# Patient Record
Sex: FEMALE | Race: BLACK OR AFRICAN AMERICAN | NOT HISPANIC OR LATINO | Employment: OTHER | URBAN - METROPOLITAN AREA
[De-identification: names, ages, dates, MRNs, and addresses within clinical notes are randomized per-mention and may not be internally consistent; named-entity substitution may affect disease eponyms.]

---

## 2023-04-26 ENCOUNTER — OFFICE VISIT (OUTPATIENT)
Dept: PRIMARY CARE | Facility: CLINIC | Age: 73
End: 2023-04-26
Payer: MEDICARE

## 2023-04-26 VITALS
SYSTOLIC BLOOD PRESSURE: 110 MMHG | DIASTOLIC BLOOD PRESSURE: 60 MMHG | WEIGHT: 185 LBS | BODY MASS INDEX: 28.98 KG/M2 | TEMPERATURE: 95.3 F

## 2023-04-26 DIAGNOSIS — E78.5 HYPERLIPIDEMIA, UNSPECIFIED HYPERLIPIDEMIA TYPE: ICD-10-CM

## 2023-04-26 DIAGNOSIS — E11.9 TYPE 2 DIABETES MELLITUS WITHOUT COMPLICATION, WITHOUT LONG-TERM CURRENT USE OF INSULIN (MULTI): ICD-10-CM

## 2023-04-26 DIAGNOSIS — Z00.00 HEALTHCARE MAINTENANCE: ICD-10-CM

## 2023-04-26 DIAGNOSIS — M51.36 LUMBAR DEGENERATIVE DISC DISEASE: Primary | ICD-10-CM

## 2023-04-26 DIAGNOSIS — R92.0 BREAST MICROCALCIFICATIONS: ICD-10-CM

## 2023-04-26 DIAGNOSIS — Z12.31 ENCOUNTER FOR SCREENING MAMMOGRAM FOR MALIGNANT NEOPLASM OF BREAST: ICD-10-CM

## 2023-04-26 PROBLEM — M79.672 BILATERAL LEG AND FOOT PAIN: Status: ACTIVE | Noted: 2023-04-26

## 2023-04-26 PROBLEM — B35.1 MYCOTIC TOENAILS: Status: ACTIVE | Noted: 2023-04-26

## 2023-04-26 PROBLEM — M79.671 BILATERAL LEG AND FOOT PAIN: Status: ACTIVE | Noted: 2023-04-26

## 2023-04-26 PROBLEM — I47.29 NON-SUSTAINED VENTRICULAR TACHYCARDIA (MULTI): Status: ACTIVE | Noted: 2023-04-26

## 2023-04-26 PROBLEM — M54.50 ACUTE LOW BACK PAIN: Status: ACTIVE | Noted: 2023-04-26

## 2023-04-26 PROBLEM — R92.8 ABNORMAL MAMMOGRAM: Status: ACTIVE | Noted: 2023-04-26

## 2023-04-26 PROBLEM — D64.9 ANEMIA: Status: ACTIVE | Noted: 2023-04-26

## 2023-04-26 PROBLEM — U09.9 POST COVID-19 CONDITION, UNSPECIFIED: Status: ACTIVE | Noted: 2023-04-26

## 2023-04-26 PROBLEM — I83.90 VARICOSE VEIN OF LEG: Status: ACTIVE | Noted: 2023-04-26

## 2023-04-26 PROBLEM — G89.29 CHRONIC PAIN OF BOTH KNEES: Status: ACTIVE | Noted: 2023-04-26

## 2023-04-26 PROBLEM — R79.89 ELEVATED D-DIMER: Status: ACTIVE | Noted: 2023-04-26

## 2023-04-26 PROBLEM — M25.552 HIP PAIN, ACUTE, LEFT: Status: ACTIVE | Noted: 2023-04-26

## 2023-04-26 PROBLEM — H40.003 GLAUCOMA SUSPECT OF BOTH EYES: Status: ACTIVE | Noted: 2023-04-26

## 2023-04-26 PROBLEM — M85.88 OSTEOPENIA OF LUMBAR SPINE: Status: ACTIVE | Noted: 2023-04-26

## 2023-04-26 PROBLEM — M25.519 PAIN, JOINT, SHOULDER: Status: ACTIVE | Noted: 2023-04-26

## 2023-04-26 PROBLEM — R21 SKIN RASH: Status: ACTIVE | Noted: 2023-04-26

## 2023-04-26 PROBLEM — M79.605 BILATERAL LEG AND FOOT PAIN: Status: ACTIVE | Noted: 2023-04-26

## 2023-04-26 PROBLEM — Z97.3 WEARS GLASSES: Status: ACTIVE | Noted: 2023-04-26

## 2023-04-26 PROBLEM — M25.562 CHRONIC PAIN OF BOTH KNEES: Status: ACTIVE | Noted: 2023-04-26

## 2023-04-26 PROBLEM — H40.009 GLAUCOMA SUSPECT: Status: ACTIVE | Noted: 2023-04-26

## 2023-04-26 PROBLEM — M54.12 CERVICAL RADICULOPATHY: Status: ACTIVE | Noted: 2023-04-26

## 2023-04-26 PROBLEM — H52.7 REFRACTIVE DISORDER: Status: ACTIVE | Noted: 2023-04-26

## 2023-04-26 PROBLEM — L65.9 HAIR LOSS: Status: ACTIVE | Noted: 2023-04-26

## 2023-04-26 PROBLEM — M19.90 OSTEOARTHROSIS: Status: ACTIVE | Noted: 2023-04-26

## 2023-04-26 PROBLEM — R26.89 STUMBLING GAIT: Status: ACTIVE | Noted: 2023-04-26

## 2023-04-26 PROBLEM — F32.1 DEPRESSION, MAJOR, SINGLE EPISODE, MODERATE (MULTI): Status: ACTIVE | Noted: 2023-04-26

## 2023-04-26 PROBLEM — E55.9 VITAMIN D DEFICIENCY: Status: ACTIVE | Noted: 2023-04-26

## 2023-04-26 PROBLEM — I10 BENIGN ESSENTIAL HYPERTENSION: Status: ACTIVE | Noted: 2023-04-26

## 2023-04-26 PROBLEM — M54.32 SCIATICA OF LEFT SIDE: Status: ACTIVE | Noted: 2023-04-26

## 2023-04-26 PROBLEM — E11.3293: Status: ACTIVE | Noted: 2023-04-26

## 2023-04-26 PROBLEM — M79.604 BILATERAL LEG AND FOOT PAIN: Status: ACTIVE | Noted: 2023-04-26

## 2023-04-26 PROBLEM — I51.7 LVH (LEFT VENTRICULAR HYPERTROPHY): Status: ACTIVE | Noted: 2023-04-26

## 2023-04-26 PROBLEM — H25.9 AGE-RELATED CATARACT: Status: ACTIVE | Noted: 2023-04-26

## 2023-04-26 PROBLEM — F32.9 REACTIVE DEPRESSION: Status: ACTIVE | Noted: 2023-04-26

## 2023-04-26 PROBLEM — D51.9 VITAMIN B12 DEFICIENCY ANEMIA: Status: ACTIVE | Noted: 2023-04-26

## 2023-04-26 PROBLEM — R20.2 PARESTHESIA OF RIGHT FOOT: Status: ACTIVE | Noted: 2023-04-26

## 2023-04-26 PROBLEM — H33.312 OPERCULATED RETINAL TEAR, LEFT EYE: Status: ACTIVE | Noted: 2023-04-26

## 2023-04-26 PROBLEM — H04.123 DRY EYES: Status: ACTIVE | Noted: 2023-04-26

## 2023-04-26 PROBLEM — H91.93 HEARING DIFFICULTY OF BOTH EARS: Status: ACTIVE | Noted: 2023-04-26

## 2023-04-26 PROBLEM — M25.561 CHRONIC PAIN OF BOTH KNEES: Status: ACTIVE | Noted: 2023-04-26

## 2023-04-26 PROBLEM — H25.13 AGE-RELATED NUCLEAR CATARACT, BILATERAL: Status: ACTIVE | Noted: 2023-04-26

## 2023-04-26 PROBLEM — I45.10 RIGHT BUNDLE BRANCH BLOCK (RBBB): Status: ACTIVE | Noted: 2023-04-26

## 2023-04-26 PROBLEM — R53.83 TIRED: Status: ACTIVE | Noted: 2023-04-26

## 2023-04-26 PROBLEM — R53.83 FATIGUE: Status: ACTIVE | Noted: 2023-04-26

## 2023-04-26 PROBLEM — R40.0 HAS DAYTIME DROWSINESS: Status: ACTIVE | Noted: 2023-04-26

## 2023-04-26 PROBLEM — M51.369 LUMBAR DEGENERATIVE DISC DISEASE: Status: ACTIVE | Noted: 2023-04-26

## 2023-04-26 PROBLEM — N18.30 CKD (CHRONIC KIDNEY DISEASE), STAGE III (MULTI): Status: ACTIVE | Noted: 2023-04-26

## 2023-04-26 PROBLEM — E78.00 LOW-DENSITY-LIPOID-TYPE (LDL) HYPERLIPOPROTEINEMIA: Status: ACTIVE | Noted: 2023-04-26

## 2023-04-26 PROBLEM — R94.31 ABNORMAL ELECTROCARDIOGRAPHY: Status: ACTIVE | Noted: 2023-04-26

## 2023-04-26 PROCEDURE — 4010F ACE/ARB THERAPY RXD/TAKEN: CPT | Performed by: INTERNAL MEDICINE

## 2023-04-26 PROCEDURE — 3078F DIAST BP <80 MM HG: CPT | Performed by: INTERNAL MEDICINE

## 2023-04-26 PROCEDURE — 3074F SYST BP LT 130 MM HG: CPT | Performed by: INTERNAL MEDICINE

## 2023-04-26 PROCEDURE — 99214 OFFICE O/P EST MOD 30 MIN: CPT | Performed by: INTERNAL MEDICINE

## 2023-04-26 RX ORDER — GLIMEPIRIDE 4 MG/1
2 TABLET ORAL DAILY
COMMUNITY
Start: 2012-11-19 | End: 2023-04-26 | Stop reason: SINTOL

## 2023-04-26 RX ORDER — AMITRIPTYLINE HYDROCHLORIDE 25 MG/1
1 TABLET, FILM COATED ORAL NIGHTLY
COMMUNITY
Start: 2012-04-06 | End: 2023-07-20 | Stop reason: SDUPTHER

## 2023-04-26 RX ORDER — LIDOCAINE 50 MG/G
1 PATCH TOPICAL DAILY
Qty: 30 PATCH | Refills: 1 | Status: SHIPPED | OUTPATIENT
Start: 2023-04-26 | End: 2024-05-02 | Stop reason: HOSPADM

## 2023-04-26 RX ORDER — METFORMIN HYDROCHLORIDE 500 MG/1
TABLET, EXTENDED RELEASE ORAL
COMMUNITY
Start: 2018-03-26 | End: 2023-07-20 | Stop reason: SDUPTHER

## 2023-04-26 RX ORDER — LISINOPRIL 10 MG/1
1 TABLET ORAL DAILY
COMMUNITY
Start: 2021-01-04 | End: 2023-07-20 | Stop reason: SDUPTHER

## 2023-04-26 RX ORDER — FERROUS SULFATE 325(65) MG
65 TABLET ORAL EVERY OTHER DAY
COMMUNITY
Start: 2022-02-04 | End: 2024-03-27 | Stop reason: ALTCHOICE

## 2023-04-26 RX ORDER — CHLORTHALIDONE 25 MG/1
1 TABLET ORAL DAILY
COMMUNITY
Start: 2012-04-06 | End: 2023-07-20 | Stop reason: SDUPTHER

## 2023-04-26 RX ORDER — DEXTROMETHORPHAN HYDROBROMIDE, GUAIFENESIN 5; 100 MG/5ML; MG/5ML
650 LIQUID ORAL EVERY 8 HOURS PRN
Qty: 30 TABLET | Refills: 0 | Status: SHIPPED | OUTPATIENT
Start: 2023-04-26 | End: 2023-05-04 | Stop reason: SDUPTHER

## 2023-04-26 RX ORDER — ROSUVASTATIN CALCIUM 40 MG/1
1 TABLET, COATED ORAL NIGHTLY
COMMUNITY
Start: 2021-02-17 | End: 2023-07-20 | Stop reason: SDUPTHER

## 2023-04-26 ASSESSMENT — PATIENT HEALTH QUESTIONNAIRE - PHQ9
2. FEELING DOWN, DEPRESSED OR HOPELESS: NOT AT ALL
1. LITTLE INTEREST OR PLEASURE IN DOING THINGS: NOT AT ALL
SUM OF ALL RESPONSES TO PHQ9 QUESTIONS 1 AND 2: 0

## 2023-04-26 NOTE — PROGRESS NOTES
Chief Complaint: Medicare Wellness Exam/Comprehensive Problem Focused Follow Up and Physical Exam    HPI:  This is a [age]-year-old -American female with past medical history positive for hyperlipidemia, hypertension, type II DM, DJD//arthritis.  She presented for checkup, Medicare wellness visit and reevaluation.  Does not smoke does not abuse EtOH.  Vitals all within normal limits.  She presented also complaining of hypoglycemic episodes with lowest level at 55.  She was recently started on Ozempic which she takes at half dose.  Had another diabetic medications include metformin, glimepiride, Januvia.  She is also complaining of lower back pain.  Pain is sharp and located at the mid lower back.  Pain is aggravated by sitting straight and relieved by laying forward.  She denies history of trauma.  Pain is moderately relieved with use of Tylenol.  She denies pain and swelling or any other joints groups.    Active Problem List  Hypertension, diabetes, hyperlipidemia, lower back pain    Comprehensive Medical/Surgical/Social/Family History  Past Medical History:   Diagnosis Date    Body mass index (BMI) 31.0-31.9, adult 12/07/2021    BMI 31.0-31.9,adult    Body mass index (BMI) 32.0-32.9, adult 07/12/2021    Body mass index (BMI) of 32.0 to 32.9 in adult    Body mass index (BMI)30.0-30.9, adult 05/03/2022    Body mass index (BMI) of 30.0 to 30.9 in adult    Body mass index (BMI)30.0-30.9, adult 09/10/2020    BMI 30.0-30.9,adult    Encounter for immunization 09/28/2018    Need for prophylactic vaccination and inoculation against influenza    Personal history of other drug therapy 03/11/2021    History of pneumococcal vaccination    Vitreous degeneration, unspecified eye     Vitreous degeneration     Past Surgical History:   Procedure Laterality Date    COLONOSCOPY  03/18/2013    Complete Colonoscopy     Social History     Social History Narrative    Not on file         Allergies and Medications  Sulfa (sulfonamide  "antibiotics)  Current Outpatient Medications on File Prior to Visit   Medication Sig Dispense Refill    amitriptyline (Elavil) 25 mg tablet Take 1 tablet (25 mg) by mouth once daily at bedtime.      chlorthalidone (Hygroton) 25 mg tablet Take 1 tablet (25 mg) by mouth once daily.      ferrous sulfate 325 (65 Fe) MG tablet Take 1 tablet (65 mg of iron) by mouth every other day.      flaxseed oil-omega 3,6,9 1,300 mg-845 mg -117 mg-117 mg capsule Take 1 capsule by mouth once daily.      lisinopril 10 mg tablet Take 1 tablet (10 mg) by mouth once daily.      metFORMIN XR (Glucophage-XR) 500 mg 24 hr tablet Take by mouth.      rosuvastatin (Crestor) 40 mg tablet Take 1 tablet (40 mg) by mouth once daily at bedtime.      semaglutide 0.25 mg or 0.5 mg (2 mg/3 mL) pen injector Inject under the skin.      [DISCONTINUED] glimepiride (Amaryl) 4 mg tablet Take 2 tablets (8 mg) by mouth once daily.       No current facility-administered medications on file prior to visit.       Medications and Supplements  prescribed by me and other practitioners or clinical pharmacist (such as prescriptions, OTC's, herbal therapies and supplements) were reviewed and documented in the medical record.    Tobacco/Alcohol/Opioid use, as well as Illicit Drug Use was screened for/reviewed and documented in Social History section and medication list as appropriate  Activities of Daily Living  In your present state of health, do you have any difficulty performing the following activities?:   Preparing food and eating?: Yes  Bathing yourself: Yes  Getting dressed: Yes  Using the toilet:Yes  Moving around from place to place: Yes  In the past year have you fallen or had a near fall?:No    Depression Screen  (Note: if answer to either of the following is \"Yes\", then a more complete depression screening is indicated)   Q1: Over the past two weeks, have you felt down, depressed or hopeless? No  Q2: Over the past two weeks, have you felt little interest or " pleasure in doing things? No    Current exercise habits: Gym/health club routine includes light weights.   Dietary issues discussed: Yes  Hearing difficulties: No  Safe in current home environment: yes  Visual Acuity assessed: no  Cognitive Impairment assessed: yes       Advance directives  Advanced Care Planning (including a Living Will, Healthcare POA, as well as specific end of life choices and/or directives), was discussed for approximately 16 minutes with the patient and/or surrogate, voluntarily, and documented in the medical record.     Cardiac Risk Assessment  Cardiovascular risk was discussed and, if needed, lifestyle modifications recommended, including nutritional choices, exercise, and elimination of habits contributing to risk. We agreed on a plan to reduce the current cardiovascular risk based on above discussion as needed.  Aspirin use/disuse was discussed after reviewing the updated guidelines below:    Consider low dose Aspirin ( mg) use if the benefit for cardiovascular disease prevention outweighs risk for bleeding complications.   In general, low dose ASA should be considered:  In patients WITHOUT prior MI/stroke/PAD (primary prevention):   a. Age <60: Use if 10-year cardiovascular disease risk >20%, with discussion of risks and benefits with patient  b. Age 60-<70: Use if 10-year cardiovascular disease risk >20% and low bleeding (e.g., gastrointenstinal) risk, with discussion of risks and benefits with patient  c. Age >=70: Do not use    In patients WITH prior MI/stroke/PAD (secondary prevention):   Generally use unless extremely high bleeding (e.g., gastrointenstinal) risk, with discussion of risks and benefits with patient    ROS otherwise negative aside from what was mentioned above in HPI.    Vitals  Vitals:    04/26/23 1432   BP: 110/60   Temp: 35.2 °C (95.3 °F)     Body mass index is 28.98 kg/m².        During the course of the visit the patient was educated and counseled about age  appropriate screening and preventive services. Completed preventive screenings were documented in the chart and orders were placed for outstanding screenings/procedures as documented in the Assessment and Plan.      Patient Instructions (the written plan) was given to the patient at check out.      Mayo Eagle MD

## 2023-04-26 NOTE — ASSESSMENT & PLAN NOTE
- Patient is compliant with the use of her diabetic medications.  - Most recent A1c was 7.1.  - Patient recently started on Ozempic with hypoglycemic episodes blood sugar of 55.  - We will discontinue glimepiride and continue Ozempic at stated dose.

## 2023-04-26 NOTE — ASSESSMENT & PLAN NOTE
- Patient presenting with worsening lower back pain.  - Pain consistent with suspicion for spinal stenosis.  - Review of prior x-ray lumbar spine showed DJD/OA of the LS-spine  - We will obtain CT LS spine.

## 2023-04-29 ENCOUNTER — LAB (OUTPATIENT)
Dept: LAB | Facility: LAB | Age: 73
End: 2023-04-29
Payer: MEDICARE

## 2023-04-29 DIAGNOSIS — M51.36 LUMBAR DEGENERATIVE DISC DISEASE: ICD-10-CM

## 2023-04-29 DIAGNOSIS — E78.5 HYPERLIPIDEMIA, UNSPECIFIED HYPERLIPIDEMIA TYPE: ICD-10-CM

## 2023-04-29 LAB
CHOLESTEROL (MG/DL) IN SER/PLAS: 114 MG/DL (ref 0–199)
CHOLESTEROL IN HDL (MG/DL) IN SER/PLAS: 53.6 MG/DL
CHOLESTEROL/HDL RATIO: 2.1
LDL: 38 MG/DL (ref 0–99)
TRIGLYCERIDE (MG/DL) IN SER/PLAS: 112 MG/DL (ref 0–149)
VLDL: 22 MG/DL (ref 0–40)

## 2023-04-29 PROCEDURE — 80061 LIPID PANEL: CPT

## 2023-04-29 PROCEDURE — 36415 COLL VENOUS BLD VENIPUNCTURE: CPT

## 2023-04-29 PROCEDURE — 82652 VIT D 1 25-DIHYDROXY: CPT

## 2023-05-01 DIAGNOSIS — E11.69 TYPE 2 DIABETES MELLITUS WITH OTHER SPECIFIED COMPLICATION, WITH LONG-TERM CURRENT USE OF INSULIN (MULTI): Primary | ICD-10-CM

## 2023-05-01 DIAGNOSIS — Z79.4 TYPE 2 DIABETES MELLITUS WITH OTHER SPECIFIED COMPLICATION, WITH LONG-TERM CURRENT USE OF INSULIN (MULTI): Primary | ICD-10-CM

## 2023-05-03 LAB — VITAMIN D 1,25-DIHYDROXY: 15.6 PG/ML (ref 19.9–79.3)

## 2023-05-04 DIAGNOSIS — M51.36 LUMBAR DEGENERATIVE DISC DISEASE: ICD-10-CM

## 2023-05-04 RX ORDER — DEXTROMETHORPHAN HYDROBROMIDE, GUAIFENESIN 5; 100 MG/5ML; MG/5ML
650 LIQUID ORAL EVERY 8 HOURS PRN
Qty: 90 TABLET | Refills: 0 | Status: SHIPPED | OUTPATIENT
Start: 2023-05-04

## 2023-05-04 RX ORDER — ACETAMINOPHEN 500 MG
1 TABLET ORAL DAILY
COMMUNITY
End: 2023-08-29 | Stop reason: SDUPTHER

## 2023-07-20 DIAGNOSIS — I10 BENIGN ESSENTIAL HYPERTENSION: Primary | ICD-10-CM

## 2023-07-20 DIAGNOSIS — Z79.4 TYPE 2 DIABETES MELLITUS WITH OTHER SPECIFIED COMPLICATION, WITH LONG-TERM CURRENT USE OF INSULIN (MULTI): ICD-10-CM

## 2023-07-20 DIAGNOSIS — E78.5 HYPERLIPIDEMIA, UNSPECIFIED HYPERLIPIDEMIA TYPE: ICD-10-CM

## 2023-07-20 DIAGNOSIS — E11.69 TYPE 2 DIABETES MELLITUS WITH OTHER SPECIFIED COMPLICATION, WITH LONG-TERM CURRENT USE OF INSULIN (MULTI): ICD-10-CM

## 2023-07-20 DIAGNOSIS — M54.12 CERVICAL RADICULOPATHY: ICD-10-CM

## 2023-07-20 RX ORDER — METFORMIN HYDROCHLORIDE 500 MG/1
TABLET, EXTENDED RELEASE ORAL
Qty: 360 TABLET | Refills: 0 | Status: SHIPPED | OUTPATIENT
Start: 2023-07-20 | End: 2023-10-09

## 2023-07-20 RX ORDER — ROSUVASTATIN CALCIUM 40 MG/1
40 TABLET, COATED ORAL NIGHTLY
Qty: 90 TABLET | Refills: 0 | Status: SHIPPED | OUTPATIENT
Start: 2023-07-20 | End: 2023-10-09

## 2023-07-20 RX ORDER — AMITRIPTYLINE HYDROCHLORIDE 25 MG/1
25 TABLET, FILM COATED ORAL NIGHTLY
Qty: 90 TABLET | Refills: 0 | Status: SHIPPED | OUTPATIENT
Start: 2023-07-20 | End: 2023-10-09

## 2023-07-20 RX ORDER — LISINOPRIL 10 MG/1
10 TABLET ORAL DAILY
Qty: 90 TABLET | Refills: 0 | Status: SHIPPED | OUTPATIENT
Start: 2023-07-20 | End: 2023-10-09

## 2023-07-20 RX ORDER — CHLORTHALIDONE 25 MG/1
25 TABLET ORAL DAILY
Qty: 90 TABLET | Refills: 0 | Status: SHIPPED | OUTPATIENT
Start: 2023-07-20 | End: 2023-10-09

## 2023-08-01 ENCOUNTER — TELEPHONE (OUTPATIENT)
Dept: PRIMARY CARE | Facility: CLINIC | Age: 73
End: 2023-08-01
Payer: MEDICARE

## 2023-08-01 DIAGNOSIS — E11.9 TYPE 2 DIABETES MELLITUS WITHOUT COMPLICATION, WITHOUT LONG-TERM CURRENT USE OF INSULIN (MULTI): Primary | ICD-10-CM

## 2023-08-25 PROBLEM — H04.123 BILATERAL DRY EYES: Status: ACTIVE | Noted: 2023-08-25

## 2023-08-25 PROBLEM — M79.645 PAIN OF LEFT THUMB: Status: ACTIVE | Noted: 2017-08-16

## 2023-08-25 PROBLEM — F41.8 SITUATIONAL ANXIETY: Status: ACTIVE | Noted: 2017-08-08

## 2023-08-25 PROBLEM — L65.9 NONSCARRING HAIR LOSS, UNSPECIFIED: Status: ACTIVE | Noted: 2021-03-23

## 2023-08-25 PROBLEM — L68.0 HIRSUTISM: Status: ACTIVE | Noted: 2021-03-23

## 2023-08-25 PROBLEM — L57.9 SKIN CHANGES DUE TO CHRONIC EXPOSURE TO NONIONIZING RADIATION, UNSPECIFIED: Status: ACTIVE | Noted: 2021-03-23

## 2023-08-25 PROBLEM — E11.319: Status: ACTIVE | Noted: 2023-08-25

## 2023-08-25 PROBLEM — H04.123 DRY EYES: Status: RESOLVED | Noted: 2023-04-26 | Resolved: 2023-08-25

## 2023-08-25 PROBLEM — L82.1 OTHER SEBORRHEIC KERATOSIS: Status: ACTIVE | Noted: 2021-03-23

## 2023-08-25 RX ORDER — MULTIVITAMIN
1 TABLET ORAL
COMMUNITY
End: 2024-03-27 | Stop reason: ALTCHOICE

## 2023-08-25 RX ORDER — ZINC GLUCONATE 50 MG
1 TABLET ORAL DAILY PRN
COMMUNITY
End: 2024-03-27 | Stop reason: ALTCHOICE

## 2023-08-25 RX ORDER — SIMVASTATIN 80 MG/1
1 TABLET, FILM COATED ORAL DAILY
COMMUNITY
Start: 2018-01-09 | End: 2024-03-27 | Stop reason: ALTCHOICE

## 2023-08-25 RX ORDER — METHYLPREDNISOLONE 4 MG
1500 TABLET, DOSE PACK ORAL
COMMUNITY
Start: 2017-01-03 | End: 2024-03-27 | Stop reason: ALTCHOICE

## 2023-08-25 RX ORDER — PROPRANOLOL/HYDROCHLOROTHIAZID 40 MG-25MG
1 TABLET ORAL DAILY
COMMUNITY
End: 2024-03-27 | Stop reason: ALTCHOICE

## 2023-08-25 RX ORDER — SITAGLIPTIN 100 MG/1
1 TABLET, FILM COATED ORAL DAILY
COMMUNITY
Start: 2020-10-13 | End: 2024-03-27 | Stop reason: ALTCHOICE

## 2023-08-25 RX ORDER — OMEGA-3 FATTY ACIDS/FISH OIL 340-1000MG
1 CAPSULE ORAL DAILY
COMMUNITY
End: 2024-03-27 | Stop reason: ALTCHOICE

## 2023-08-25 RX ORDER — CITALOPRAM 10 MG/1
10 TABLET ORAL
COMMUNITY
Start: 2017-08-08 | End: 2024-03-27 | Stop reason: ALTCHOICE

## 2023-08-25 RX ORDER — PNV NO.95/FERROUS FUM/FOLIC AC 28MG-0.8MG
4000 TABLET ORAL
COMMUNITY
End: 2024-03-27 | Stop reason: ALTCHOICE

## 2023-08-25 RX ORDER — ETODOLAC 400 MG/1
400 TABLET, FILM COATED ORAL 2 TIMES DAILY
COMMUNITY
Start: 2016-09-21 | End: 2024-03-27 | Stop reason: ALTCHOICE

## 2023-08-29 ENCOUNTER — OFFICE VISIT (OUTPATIENT)
Dept: PRIMARY CARE | Facility: CLINIC | Age: 73
End: 2023-08-29
Payer: MEDICARE

## 2023-08-29 ENCOUNTER — LAB (OUTPATIENT)
Dept: LAB | Facility: LAB | Age: 73
End: 2023-08-29
Payer: MEDICARE

## 2023-08-29 VITALS — DIASTOLIC BLOOD PRESSURE: 82 MMHG | SYSTOLIC BLOOD PRESSURE: 128 MMHG | WEIGHT: 169 LBS | BODY MASS INDEX: 26.47 KG/M2

## 2023-08-29 DIAGNOSIS — Z11.59 ENCOUNTER FOR HCV SCREENING TEST FOR LOW RISK PATIENT: ICD-10-CM

## 2023-08-29 DIAGNOSIS — E11.319 DIABETIC RETINOPATHY WITHOUT MACULAR EDEMA ASSOCIATED WITH TYPE 2 DIABETES MELLITUS, UNSPECIFIED LATERALITY, UNSPECIFIED RETINOPATHY SEVERITY (MULTI): ICD-10-CM

## 2023-08-29 DIAGNOSIS — N18.31 STAGE 3A CHRONIC KIDNEY DISEASE (MULTI): ICD-10-CM

## 2023-08-29 DIAGNOSIS — I47.29 NON-SUSTAINED VENTRICULAR TACHYCARDIA (MULTI): ICD-10-CM

## 2023-08-29 DIAGNOSIS — E08.21 DIABETES MELLITUS DUE TO UNDERLYING CONDITION WITH DIABETIC NEPHROPATHY, WITHOUT LONG-TERM CURRENT USE OF INSULIN (MULTI): ICD-10-CM

## 2023-08-29 DIAGNOSIS — E08.22 DIABETES MELLITUS DUE TO UNDERLYING CONDITION WITH CHRONIC KIDNEY DISEASE, WITHOUT LONG-TERM CURRENT USE OF INSULIN, UNSPECIFIED CKD STAGE (MULTI): ICD-10-CM

## 2023-08-29 DIAGNOSIS — F32.1 DEPRESSION, MAJOR, SINGLE EPISODE, MODERATE (MULTI): ICD-10-CM

## 2023-08-29 DIAGNOSIS — E55.9 VITAMIN D INSUFFICIENCY: Primary | ICD-10-CM

## 2023-08-29 LAB
ALANINE AMINOTRANSFERASE (SGPT) (U/L) IN SER/PLAS: 23 U/L (ref 7–45)
ALBUMIN (G/DL) IN SER/PLAS: 4.3 G/DL (ref 3.4–5)
ALBUMIN (MG/L) IN URINE: 8.7 MG/L
ALBUMIN/CREATININE (UG/MG) IN URINE: 8.1 UG/MG CRT (ref 0–30)
ALKALINE PHOSPHATASE (U/L) IN SER/PLAS: 68 U/L (ref 33–136)
ANION GAP IN SER/PLAS: 14 MMOL/L (ref 10–20)
ASPARTATE AMINOTRANSFERASE (SGOT) (U/L) IN SER/PLAS: 21 U/L (ref 9–39)
BILIRUBIN TOTAL (MG/DL) IN SER/PLAS: 0.4 MG/DL (ref 0–1.2)
CALCIUM (MG/DL) IN SER/PLAS: 9.5 MG/DL (ref 8.6–10.6)
CARBON DIOXIDE, TOTAL (MMOL/L) IN SER/PLAS: 28 MMOL/L (ref 21–32)
CHLORIDE (MMOL/L) IN SER/PLAS: 102 MMOL/L (ref 98–107)
CREATININE (MG/DL) IN SER/PLAS: 1.12 MG/DL (ref 0.5–1.05)
CREATININE (MG/DL) IN URINE: 108 MG/DL (ref 20–320)
ESTIMATED AVERAGE GLUCOSE FOR HBA1C: 128 MG/DL
GFR FEMALE: 52 ML/MIN/1.73M2
GLUCOSE (MG/DL) IN SER/PLAS: 121 MG/DL (ref 74–99)
HEMOGLOBIN A1C/HEMOGLOBIN TOTAL IN BLOOD: 6.1 %
HEPATITIS C VIRUS AB PRESENCE IN SERUM: NONREACTIVE
POTASSIUM (MMOL/L) IN SER/PLAS: 3.9 MMOL/L (ref 3.5–5.3)
PROTEIN TOTAL: 6.9 G/DL (ref 6.4–8.2)
SODIUM (MMOL/L) IN SER/PLAS: 140 MMOL/L (ref 136–145)
THYROTROPIN (MIU/L) IN SER/PLAS BY DETECTION LIMIT <= 0.05 MIU/L: 2.18 MIU/L (ref 0.44–3.98)
UREA NITROGEN (MG/DL) IN SER/PLAS: 14 MG/DL (ref 6–23)

## 2023-08-29 PROCEDURE — 1126F AMNT PAIN NOTED NONE PRSNT: CPT | Performed by: INTERNAL MEDICINE

## 2023-08-29 PROCEDURE — 1036F TOBACCO NON-USER: CPT | Performed by: INTERNAL MEDICINE

## 2023-08-29 PROCEDURE — 1160F RVW MEDS BY RX/DR IN RCRD: CPT | Performed by: INTERNAL MEDICINE

## 2023-08-29 PROCEDURE — 3066F NEPHROPATHY DOC TX: CPT | Performed by: INTERNAL MEDICINE

## 2023-08-29 PROCEDURE — 83036 HEMOGLOBIN GLYCOSYLATED A1C: CPT

## 2023-08-29 PROCEDURE — 3074F SYST BP LT 130 MM HG: CPT | Performed by: INTERNAL MEDICINE

## 2023-08-29 PROCEDURE — 4010F ACE/ARB THERAPY RXD/TAKEN: CPT | Performed by: INTERNAL MEDICINE

## 2023-08-29 PROCEDURE — 82043 UR ALBUMIN QUANTITATIVE: CPT

## 2023-08-29 PROCEDURE — 3079F DIAST BP 80-89 MM HG: CPT | Performed by: INTERNAL MEDICINE

## 2023-08-29 PROCEDURE — 82570 ASSAY OF URINE CREATININE: CPT

## 2023-08-29 PROCEDURE — 80053 COMPREHEN METABOLIC PANEL: CPT

## 2023-08-29 PROCEDURE — 99214 OFFICE O/P EST MOD 30 MIN: CPT | Performed by: INTERNAL MEDICINE

## 2023-08-29 PROCEDURE — 1159F MED LIST DOCD IN RCRD: CPT | Performed by: INTERNAL MEDICINE

## 2023-08-29 PROCEDURE — 86803 HEPATITIS C AB TEST: CPT

## 2023-08-29 PROCEDURE — 84443 ASSAY THYROID STIM HORMONE: CPT

## 2023-08-29 PROCEDURE — 2028F FOOT EXAM PERFORMED: CPT | Performed by: INTERNAL MEDICINE

## 2023-08-29 PROCEDURE — 36415 COLL VENOUS BLD VENIPUNCTURE: CPT

## 2023-08-29 RX ORDER — ACETAMINOPHEN 500 MG
5000 TABLET ORAL DAILY
Qty: 90 TABLET | Refills: 3 | Status: SHIPPED | OUTPATIENT
Start: 2023-08-29

## 2023-08-29 ASSESSMENT — PATIENT HEALTH QUESTIONNAIRE - PHQ9
2. FEELING DOWN, DEPRESSED OR HOPELESS: NOT AT ALL
SUM OF ALL RESPONSES TO PHQ9 QUESTIONS 1 AND 2: 0
1. LITTLE INTEREST OR PLEASURE IN DOING THINGS: NOT AT ALL

## 2023-08-29 NOTE — PROGRESS NOTES
Chief Complaint:   Chief Complaint   Patient presents with    Follow-up      Subjective     HPI    73 y.o. female with a PMH significant for Hyperlipidemia hypertension, type 2 diabetes mellitus, low back pain and anemia presents to the clinic for follow-up.  The patient's blood pressure taken in the office stable at 128/82 CT lumbar spine demonstrated a grade 1 anterolisthesis of the L4-L5 as well as spinal canal stenosis.  Lower back pain much improved, patient states she has a therapy appointment this afternoon.  The patient is otherwise doing well.    ROS   Review of Systems   All other systems reviewed and are negative.       Objective    Visit Vitals  /82      BMI Readings from Last 15 Encounters:   08/29/23 26.47 kg/m²   05/18/23 28.82 kg/m²   04/26/23 28.98 kg/m²   12/12/22 31.01 kg/m²   10/28/22 31.32 kg/m²   09/20/22 31.64 kg/m²   06/23/22 31.07 kg/m²   05/19/22 31.02 kg/m²   05/16/22 31.17 kg/m²   05/03/22 30.87 kg/m²   02/17/22 30.70 kg/m²   12/07/21 31.17 kg/m²   08/05/21 31.95 kg/m²   07/12/21 32.42 kg/m²   06/15/21 32.26 kg/m²      Lab Results   Component Value Date    HGBA1C 8.2 (A) 12/29/2022      Lab Results   Component Value Date    HGBA1C 8.2 (A) 12/29/2022    HGBA1C 7.8 (A) 09/20/2022    HGBA1C 7.6 (A) 05/06/2022     Lab Results   Component Value Date    CREATININE 1.45 (H) 12/29/2022      Lab Results   Component Value Date    WBC 4.8 12/29/2022    HGB 11.3 (L) 12/29/2022    HCT 35.6 (L) 12/29/2022    MCV 89 12/29/2022     12/29/2022        Chemistry    Lab Results   Component Value Date/Time     12/29/2022 1127    K 4.1 12/29/2022 1127     12/29/2022 1127    CO2 26 12/29/2022 1127    BUN 23 12/29/2022 1127    CREATININE 1.45 (H) 12/29/2022 1127    Lab Results   Component Value Date/Time    CALCIUM 9.0 12/29/2022 1127    ALKPHOS 74 12/29/2022 1127    AST 17 12/29/2022 1127    ALT 17 12/29/2022 1127    BILITOT 0.4 12/29/2022 1127             Physical Exam  Physical  Exam  Vitals reviewed.   Constitutional:       Appearance: Normal appearance.   Eyes:      Extraocular Movements: Extraocular movements intact.      Conjunctiva/sclera: Conjunctivae normal.      Pupils: Pupils are equal, round, and reactive to light.   Cardiovascular:      Rate and Rhythm: Normal rate and regular rhythm.   Pulmonary:      Effort: Pulmonary effort is normal.      Breath sounds: Normal breath sounds.   Abdominal:      General: Bowel sounds are normal.      Palpations: Abdomen is soft.   Neurological:      Mental Status: She is alert.          Assessment/Plan    The following medical issues were discussed during this encounter  :   #Type 2 diabetes mellitus  -Hemoglobin A1c obtained last year was elevated at 8.2  -Repeat hemoglobin A1c pending  -Continue metformin 500 mg, Ozempic 4 mg / 3 mL mL  - Patient was counseled on the placement of Selina 2 glucose monitor  in the posterior side of her side of her left upper arm     # Back pain   - Physical therapy today       The following labs were ordered to be completed before the patient's next visit:   Albumin urine,, comprehensive metabolic panel [place] panel, hemoglobin A1c, hepatitis C hepatitis C , TSH       Immunizations: UTD        Please return in 3 months or if symptoms worsen     José Forrester MD

## 2023-08-29 NOTE — PROGRESS NOTES
I reviewed with the resident the medical history and the resident’s findings on physical examination.  I discussed with the resident the patient’s diagnosis and concur with the treatment plan as documented in the resident note.     I saw and evaluated the patient. I personally obtained the key and critical portions of the history and physical exam or was physically present for key and critical portions performed by the trainee. I reviewed the trainee's documentation and discussed the patient with the trainee. I agree with the trainee's medical decision making, as documented on the trainee's note.     Libra Ramos MD

## 2023-08-30 NOTE — RESULT ENCOUNTER NOTE
Results reviewed.  There are some minor abnormalities, which are not concerning.  No changes in medications are needed at this time.  Please continue with current treatment.    Best,  Dr. Smyth

## 2023-10-09 DIAGNOSIS — E78.5 HYPERLIPIDEMIA, UNSPECIFIED HYPERLIPIDEMIA TYPE: ICD-10-CM

## 2023-10-09 DIAGNOSIS — E11.69 TYPE 2 DIABETES MELLITUS WITH OTHER SPECIFIED COMPLICATION, WITH LONG-TERM CURRENT USE OF INSULIN (MULTI): ICD-10-CM

## 2023-10-09 DIAGNOSIS — M54.12 CERVICAL RADICULOPATHY: ICD-10-CM

## 2023-10-09 DIAGNOSIS — I10 BENIGN ESSENTIAL HYPERTENSION: ICD-10-CM

## 2023-10-09 DIAGNOSIS — Z79.4 TYPE 2 DIABETES MELLITUS WITH OTHER SPECIFIED COMPLICATION, WITH LONG-TERM CURRENT USE OF INSULIN (MULTI): ICD-10-CM

## 2023-10-09 RX ORDER — LISINOPRIL 10 MG/1
10 TABLET ORAL DAILY
Qty: 90 TABLET | Refills: 0 | Status: SHIPPED | OUTPATIENT
Start: 2023-10-09 | End: 2024-01-08

## 2023-10-09 RX ORDER — AMITRIPTYLINE HYDROCHLORIDE 25 MG/1
25 TABLET, FILM COATED ORAL NIGHTLY
Qty: 90 TABLET | Refills: 0 | Status: SHIPPED | OUTPATIENT
Start: 2023-10-09 | End: 2023-12-13

## 2023-10-09 RX ORDER — CHLORTHALIDONE 25 MG/1
25 TABLET ORAL DAILY
Qty: 90 TABLET | Refills: 0 | Status: SHIPPED | OUTPATIENT
Start: 2023-10-09 | End: 2024-01-08

## 2023-10-09 RX ORDER — METFORMIN HYDROCHLORIDE 500 MG/1
TABLET, EXTENDED RELEASE ORAL
Qty: 360 TABLET | Refills: 0 | Status: SHIPPED | OUTPATIENT
Start: 2023-10-09 | End: 2024-01-08

## 2023-10-09 RX ORDER — ROSUVASTATIN CALCIUM 40 MG/1
40 TABLET, COATED ORAL NIGHTLY
Qty: 90 TABLET | Refills: 0 | Status: SHIPPED | OUTPATIENT
Start: 2023-10-09 | End: 2023-12-13

## 2023-11-07 DIAGNOSIS — E11.9 TYPE 2 DIABETES MELLITUS WITHOUT COMPLICATION, WITHOUT LONG-TERM CURRENT USE OF INSULIN (MULTI): ICD-10-CM

## 2023-11-07 RX ORDER — SEMAGLUTIDE 1.34 MG/ML
1 INJECTION, SOLUTION SUBCUTANEOUS
Qty: 3 ML | Refills: 12 | Status: SHIPPED | OUTPATIENT
Start: 2023-11-07 | End: 2024-04-09 | Stop reason: SDUPTHER

## 2023-11-29 ENCOUNTER — APPOINTMENT (OUTPATIENT)
Dept: PRIMARY CARE | Facility: CLINIC | Age: 73
End: 2023-11-29
Payer: MEDICARE

## 2023-12-13 DIAGNOSIS — E78.5 HYPERLIPIDEMIA, UNSPECIFIED HYPERLIPIDEMIA TYPE: ICD-10-CM

## 2023-12-13 DIAGNOSIS — M54.12 CERVICAL RADICULOPATHY: ICD-10-CM

## 2023-12-13 RX ORDER — AMITRIPTYLINE HYDROCHLORIDE 25 MG/1
25 TABLET, FILM COATED ORAL NIGHTLY
Qty: 90 TABLET | Refills: 0 | Status: SHIPPED | OUTPATIENT
Start: 2023-12-13

## 2023-12-13 RX ORDER — ROSUVASTATIN CALCIUM 40 MG/1
40 TABLET, COATED ORAL DAILY
Qty: 90 TABLET | Refills: 0 | Status: SHIPPED | OUTPATIENT
Start: 2023-12-13

## 2023-12-29 ENCOUNTER — APPOINTMENT (OUTPATIENT)
Dept: PRIMARY CARE | Facility: CLINIC | Age: 73
End: 2023-12-29
Payer: MEDICARE

## 2024-01-08 DIAGNOSIS — Z79.4 TYPE 2 DIABETES MELLITUS WITH OTHER SPECIFIED COMPLICATION, WITH LONG-TERM CURRENT USE OF INSULIN (MULTI): ICD-10-CM

## 2024-01-08 DIAGNOSIS — I10 BENIGN ESSENTIAL HYPERTENSION: ICD-10-CM

## 2024-01-08 DIAGNOSIS — E11.69 TYPE 2 DIABETES MELLITUS WITH OTHER SPECIFIED COMPLICATION, WITH LONG-TERM CURRENT USE OF INSULIN (MULTI): ICD-10-CM

## 2024-01-08 RX ORDER — LISINOPRIL 10 MG/1
10 TABLET ORAL DAILY
Qty: 90 TABLET | Refills: 3 | Status: SHIPPED | OUTPATIENT
Start: 2024-01-08

## 2024-01-08 RX ORDER — CHLORTHALIDONE 25 MG/1
25 TABLET ORAL DAILY
Qty: 90 TABLET | Refills: 3 | Status: SHIPPED | OUTPATIENT
Start: 2024-01-08

## 2024-01-08 RX ORDER — METFORMIN HYDROCHLORIDE 500 MG/1
TABLET, EXTENDED RELEASE ORAL
Qty: 360 TABLET | Refills: 3 | Status: SHIPPED | OUTPATIENT
Start: 2024-01-08

## 2024-01-17 ENCOUNTER — OFFICE VISIT (OUTPATIENT)
Dept: PRIMARY CARE | Facility: CLINIC | Age: 74
End: 2024-01-17
Payer: MEDICARE

## 2024-01-17 VITALS
HEIGHT: 67 IN | BODY MASS INDEX: 24.48 KG/M2 | SYSTOLIC BLOOD PRESSURE: 100 MMHG | DIASTOLIC BLOOD PRESSURE: 60 MMHG | WEIGHT: 156 LBS

## 2024-01-17 DIAGNOSIS — L65.9 HAIR LOSS: Primary | ICD-10-CM

## 2024-01-17 DIAGNOSIS — K43.9 HERNIA OF ABDOMINAL WALL: Primary | ICD-10-CM

## 2024-01-17 DIAGNOSIS — K43.9 ABDOMINAL WALL HERNIA: ICD-10-CM

## 2024-01-17 PROCEDURE — 1036F TOBACCO NON-USER: CPT | Performed by: INTERNAL MEDICINE

## 2024-01-17 PROCEDURE — 4010F ACE/ARB THERAPY RXD/TAKEN: CPT | Performed by: INTERNAL MEDICINE

## 2024-01-17 PROCEDURE — 3078F DIAST BP <80 MM HG: CPT | Performed by: INTERNAL MEDICINE

## 2024-01-17 PROCEDURE — 3066F NEPHROPATHY DOC TX: CPT | Performed by: INTERNAL MEDICINE

## 2024-01-17 PROCEDURE — 99213 OFFICE O/P EST LOW 20 MIN: CPT | Performed by: INTERNAL MEDICINE

## 2024-01-17 PROCEDURE — 1126F AMNT PAIN NOTED NONE PRSNT: CPT | Performed by: INTERNAL MEDICINE

## 2024-01-17 PROCEDURE — 3074F SYST BP LT 130 MM HG: CPT | Performed by: INTERNAL MEDICINE

## 2024-01-17 PROCEDURE — 1160F RVW MEDS BY RX/DR IN RCRD: CPT | Performed by: INTERNAL MEDICINE

## 2024-01-17 ASSESSMENT — PATIENT HEALTH QUESTIONNAIRE - PHQ9
SUM OF ALL RESPONSES TO PHQ9 QUESTIONS 1 AND 2: 0
1. LITTLE INTEREST OR PLEASURE IN DOING THINGS: NOT AT ALL
2. FEELING DOWN, DEPRESSED OR HOPELESS: NOT AT ALL

## 2024-01-17 ASSESSMENT — LIFESTYLE VARIABLES
HOW OFTEN DO YOU HAVE A DRINK CONTAINING ALCOHOL: MONTHLY OR LESS
HOW OFTEN DO YOU HAVE SIX OR MORE DRINKS ON ONE OCCASION: NEVER

## 2024-01-17 NOTE — PROGRESS NOTES
I saw and evaluated the patient. I personally obtained the key and critical portions of the history and physical exam or was physically present for key and critical portions performed by the resident/fellow. I reviewed the resident/fellow's documentation and discussed the patient with the resident/fellow. I agree with the resident/fellow's medical decision making as documented in the note.    Libra Ramos MD

## 2024-01-17 NOTE — PROGRESS NOTES
Subjective   Monica Trotter is a 73 y.o. female.    Chief Complaint:  lump in groin are R side    HPI  Here for mass on R groin region. She states she noticed it about 6 weeks ago when she was going to bed. She was feeling the skin of her abdomen when she suddenly noticed the mass which when pushed on, released some gaseous sounds. She denies any pain associated with it and states that it has not been growing in size. She has had no recent abdominal surgery. She does not have any changes to bowel movements, no dysuria or hematuria. No fevers, chills, abdominal pain, n/v, headaches, dizziness, or LOC.    Pt is concerned about excessive hair loss over the past few years. She denies any other associated symptoms and would like to be seen by dermatology.     ROS  12 point ROS otherwise negative.     Objective   Vitals reviewed.   Constitutional:       Appearance: Healthy appearance. Not in distress.   Eyes:      Conjunctiva/sclera: Conjunctivae normal.   Pulmonary:      Effort: Pulmonary effort is normal.      Breath sounds: Normal breath sounds.   Cardiovascular:      Normal rate. Regular rhythm.      Murmurs: There is no murmur.   Edema:     Peripheral edema absent.   Abdominal:      General: Bowel sounds are normal.      Comments: R lower abdomen with noted gaseous sounds when palpated upon. No pain or tenderness on palpation. No guarding or rigidity. Does not change size with straining.    Musculoskeletal: Normal range of motion.      Cervical back: Normal range of motion. Skin:     General: Skin is warm and dry.   Neurological:      Mental Status: Alert and oriented to person, place and time.       Assessment/Plan   There were no encounter diagnoses.    # Lower abdominal wall hernia  - Pt has minimal to no abdominal wall muscles, gas felt and heard on palpation of R sided lower abdominal region. No pain or discomfort. No changes in bowel movement.   - Will send referral to general surgery for further evaluation    #  Hair loss  - Noticed over the past few years.   - TSH within normal limits.   - Derm referral sent    Pt has follow up visit scheduled for February 2024.

## 2024-01-18 ENCOUNTER — OFFICE VISIT (OUTPATIENT)
Dept: SURGERY | Facility: CLINIC | Age: 74
End: 2024-01-18
Payer: MEDICARE

## 2024-01-18 VITALS — BODY MASS INDEX: 23.49 KG/M2 | WEIGHT: 150 LBS

## 2024-01-18 DIAGNOSIS — K40.91 UNILATERAL INGUINAL HERNIA WITHOUT OBSTRUCTION OR GANGRENE, RECURRENT: Primary | ICD-10-CM

## 2024-01-18 DIAGNOSIS — K43.9 HERNIA OF ABDOMINAL WALL: ICD-10-CM

## 2024-01-18 PROCEDURE — 99213 OFFICE O/P EST LOW 20 MIN: CPT | Performed by: SURGERY

## 2024-01-18 PROCEDURE — 1036F TOBACCO NON-USER: CPT | Performed by: SURGERY

## 2024-01-18 PROCEDURE — 99203 OFFICE O/P NEW LOW 30 MIN: CPT | Performed by: SURGERY

## 2024-01-18 PROCEDURE — 1159F MED LIST DOCD IN RCRD: CPT | Performed by: SURGERY

## 2024-01-18 PROCEDURE — 4010F ACE/ARB THERAPY RXD/TAKEN: CPT | Performed by: SURGERY

## 2024-01-18 PROCEDURE — 1126F AMNT PAIN NOTED NONE PRSNT: CPT | Performed by: SURGERY

## 2024-01-18 PROCEDURE — 1160F RVW MEDS BY RX/DR IN RCRD: CPT | Performed by: SURGERY

## 2024-01-18 PROCEDURE — 3066F NEPHROPATHY DOC TX: CPT | Performed by: SURGERY

## 2024-01-18 ASSESSMENT — PAIN SCALES - GENERAL: PAINLEVEL: 0-NO PAIN

## 2024-01-18 NOTE — LETTER
2024     Libra Ramos MD  [address]  [facility] [address]  [facility] [id number]    Patient: Monica Trotter   YOB: 1950   Date of Visit: 2024       Dear Dr. Libra Ramos MD:    Thank you for referring Monica Trotter to me for evaluation. Below are my notes for this consultation.  If you have questions, please do not hesitate to call me. I look forward to following your patient along with you.       Sincerely,     Philip Junior MD      CC: No Recipients  ______________________________________________________________________________________    Subjective  Patient ID: Monica Trotter is a 73 y.o. female who presents for Hernia.  HPI  Patient is a very pleasant 73-year-old female who is referred for evaluation and treatment of a fairly asymptomatic right inguinal hernia.  She has noted a bulge in the right groin for the past 6 weeks.  She has occasional discomfort especially with exertion.    She is still mourning the loss of her  of 56 years who  roughly 1 month ago    Review of Systems  On review of systems patient denies any weight loss, fever, change in bowel habits, melena, hematochezia, coronary artery disease,, TIA/CVA, bleeding, jaundice, pancreatitis, hepatitis.    Patient does not smoke. Patient does very rarely drink alcohol.  She is retired   Past Medical History:   Diagnosis Date   • Body mass index (BMI) 31.0-31.9, adult 2021    BMI 31.0-31.9,adult   • Body mass index (BMI) 32.0-32.9, adult 2021    Body mass index (BMI) of 32.0 to 32.9 in adult   • Body mass index (BMI)30.0-30.9, adult 2022    Body mass index (BMI) of 30.0 to 30.9 in adult   • Body mass index (BMI)30.0-30.9, adult 09/10/2020    BMI 30.0-30.9,adult   • Encounter for immunization 2018    Need for prophylactic vaccination and inoculation against influenza   • Personal history of other drug therapy 2021    History of pneumococcal vaccination   • Vitreous  degeneration, unspecified eye     Vitreous degeneration   PMH: DM, HTN    Past Surgical History:   Procedure Laterality Date   • COLONOSCOPY  03/18/2013    Complete Colonoscopy   PSH: Hysterectomy, laparoscopic cholecystectomy, shoulder surgery        Objective    Physical Exam  Well-nourished, well-developed. In no distress  Alert and oriented x 3  Lungs are clear to auscultation bilaterally.  Cardiac exam is regular rhythm and rate.  Abdomen is soft nontender nondistended.  Neurologic exam is without focal deficit.  Small to moderate sized right inguinal hernia. Reduces easily.  No hernia noted on the left side     Assessment/Plan  Diagnoses and all orders for this visit:  Unilateral inguinal hernia without obstruction or gangrene, recurrent  -     Case Request Operating Room: Repair Inguinal Hernia Robot-Assisted; Standing    Impression:   Right inguinal hernia.  I have recommended robotic right inguinal hernia repair with mesh.  We discussed the procedure to include risk, benefits and alternatives.  She agrees to the operation which is tentatively scheduled for next month

## 2024-01-18 NOTE — PROGRESS NOTES
Subjective   Patient ID: Monica Trotter is a 73 y.o. female who presents for Hernia.  HPI  Patient is a very pleasant 73-year-old female who is referred for evaluation and treatment of a fairly asymptomatic right inguinal hernia.  She has noted a bulge in the right groin for the past 6 weeks.  She has occasional discomfort especially with exertion.    She is still mourning the loss of her  of 56 years who  roughly 1 month ago    Review of Systems  On review of systems patient denies any weight loss, fever, change in bowel habits, melena, hematochezia, coronary artery disease,, TIA/CVA, bleeding, jaundice, pancreatitis, hepatitis.    Patient does not smoke. Patient does very rarely drink alcohol.  She is retired   Past Medical History:   Diagnosis Date    Body mass index (BMI) 31.0-31.9, adult 2021    BMI 31.0-31.9,adult    Body mass index (BMI) 32.0-32.9, adult 2021    Body mass index (BMI) of 32.0 to 32.9 in adult    Body mass index (BMI)30.0-30.9, adult 2022    Body mass index (BMI) of 30.0 to 30.9 in adult    Body mass index (BMI)30.0-30.9, adult 09/10/2020    BMI 30.0-30.9,adult    Encounter for immunization 2018    Need for prophylactic vaccination and inoculation against influenza    Personal history of other drug therapy 2021    History of pneumococcal vaccination    Vitreous degeneration, unspecified eye     Vitreous degeneration   PMH: DM, HTN    Past Surgical History:   Procedure Laterality Date    COLONOSCOPY  2013    Complete Colonoscopy   PSH: Hysterectomy, laparoscopic cholecystectomy, shoulder surgery        Objective     Physical Exam  Well-nourished, well-developed. In no distress  Alert and oriented x 3  Lungs are clear to auscultation bilaterally.  Cardiac exam is regular rhythm and rate.  Abdomen is soft nontender nondistended.  Neurologic exam is without focal deficit.  Small to moderate sized right inguinal hernia. Reduces easily.   No hernia noted on the left side     Assessment/Plan   Diagnoses and all orders for this visit:  Unilateral inguinal hernia without obstruction or gangrene, recurrent  -     Case Request Operating Room: Repair Inguinal Hernia Robot-Assisted; Standing    Impression:   Right inguinal hernia.  I have recommended robotic right inguinal hernia repair with mesh.  We discussed the procedure to include risk, benefits and alternatives.  She agrees to the operation which is tentatively scheduled for next month

## 2024-01-19 PROBLEM — K40.91 UNILATERAL INGUINAL HERNIA WITHOUT OBSTRUCTION OR GANGRENE, RECURRENT: Status: ACTIVE | Noted: 2024-01-18

## 2024-01-26 ENCOUNTER — TELEPHONE (OUTPATIENT)
Dept: SURGERY | Facility: CLINIC | Age: 74
End: 2024-01-26
Payer: MEDICARE

## 2024-01-26 NOTE — TELEPHONE ENCOUNTER
Patient left message stating that she is scheduled for surgery on May 2, 2024. She complains of hernia increasing in size. Request call back at [phone]

## 2024-01-26 NOTE — TELEPHONE ENCOUNTER
Spoke with rula I made her a apt for Friday the 2nd. Since it is getting bigger I think dr otto has to take a look at it. I told her if it gets worse over the weekend she should go to the er.  Sabrina

## 2024-02-02 ENCOUNTER — OFFICE VISIT (OUTPATIENT)
Dept: SURGERY | Facility: CLINIC | Age: 74
End: 2024-02-02
Payer: MEDICARE

## 2024-02-02 VITALS
DIASTOLIC BLOOD PRESSURE: 82 MMHG | BODY MASS INDEX: 23.84 KG/M2 | SYSTOLIC BLOOD PRESSURE: 147 MMHG | TEMPERATURE: 96.6 F | HEART RATE: 100 BPM | WEIGHT: 152.2 LBS

## 2024-02-02 DIAGNOSIS — K40.91 UNILATERAL INGUINAL HERNIA WITHOUT OBSTRUCTION OR GANGRENE, RECURRENT: Primary | ICD-10-CM

## 2024-02-02 PROCEDURE — 4010F ACE/ARB THERAPY RXD/TAKEN: CPT | Performed by: SURGERY

## 2024-02-02 PROCEDURE — 1125F AMNT PAIN NOTED PAIN PRSNT: CPT | Performed by: SURGERY

## 2024-02-02 PROCEDURE — 1036F TOBACCO NON-USER: CPT | Performed by: SURGERY

## 2024-02-02 PROCEDURE — 3066F NEPHROPATHY DOC TX: CPT | Performed by: SURGERY

## 2024-02-02 PROCEDURE — 3077F SYST BP >= 140 MM HG: CPT | Performed by: SURGERY

## 2024-02-02 PROCEDURE — 99212 OFFICE O/P EST SF 10 MIN: CPT | Performed by: SURGERY

## 2024-02-02 PROCEDURE — 3079F DIAST BP 80-89 MM HG: CPT | Performed by: SURGERY

## 2024-02-02 PROCEDURE — 1159F MED LIST DOCD IN RCRD: CPT | Performed by: SURGERY

## 2024-02-02 ASSESSMENT — PAIN SCALES - GENERAL: PAINLEVEL: 6

## 2024-02-02 NOTE — PROGRESS NOTES
Subjective   Patient ID: Monica Trotter is a 73 y.o. female who presents for follow-up of right inguinal hernia.    HPI:  As a reminder, patient is a very pleasant 73-year-old female who I last saw on 1/18/2024.  The bulge has been present for about 8 weeks, is right-sided.  She has had occasional discomfort which is exacerbated by physical exertion.    At initial consultation I recommended robotic right inguinal hernia repair with mesh which she was agreeable to which is currently booked for May due to robot time.  Comes in today 2 weeks later stating that her inguinal pain is worsened over the last 2 weeks.  She feels that the hernia is getting slightly larger.  She states it still reduces easily but it has been more of a nuisance to her as of late.  She states that traditionally her bowel movements are every 1 day now it is more closer to every 2 or 3 days.  Of note she is still mourning the loss of her .    Patient denies any weight loss, fever, melena, hematochezia, jaundice. Denies any history of heart disease or cerebrovascular disease.    Patient does not smoke. Patient does very rarely drink alcohol.  She is retired      Past Medical History:   Diagnosis Date    Body mass index (BMI) 31.0-31.9, adult 12/07/2021    BMI 31.0-31.9,adult    Body mass index (BMI) 32.0-32.9, adult 07/12/2021    Body mass index (BMI) of 32.0 to 32.9 in adult    Body mass index (BMI)30.0-30.9, adult 05/03/2022    Body mass index (BMI) of 30.0 to 30.9 in adult    Body mass index (BMI)30.0-30.9, adult 09/10/2020    BMI 30.0-30.9,adult    Encounter for immunization 09/28/2018    Need for prophylactic vaccination and inoculation against influenza    Personal history of other drug therapy 03/11/2021    History of pneumococcal vaccination    Vitreous degeneration, unspecified eye     Vitreous degeneration        Past Surgical History:   Procedure Laterality Date    COLONOSCOPY  03/18/2013    Complete Colonoscopy     PSH: Hysterectomy, laparoscopic cholecystectomy, shoulder surgery     Objective   Physical Exam:  General: Well developed patient, alert and oriented, NAD  Neuro: A&Ox3, grossly normal  CV: RRR, nrl S1, S2, no murmur, rubs, or clicks  Resp: Good bilateral air entry. No crackles,  wheezing, or rhonchi  Abdomen: Non distended, + BS, soft, non-tender  Small to moderate sized right inguinal hernia. Reduces easily.  No hernia noted on the left side     Assessment/Plan        Inpression:  symptomatic righ tinguinal hernia.  Plan to move up surgery date (robotic right inguinal hernia repair with mesh).  My office will call her back with a date

## 2024-02-05 ENCOUNTER — APPOINTMENT (OUTPATIENT)
Dept: PRIMARY CARE | Facility: CLINIC | Age: 74
End: 2024-02-05
Payer: MEDICARE

## 2024-02-07 ENCOUNTER — LAB (OUTPATIENT)
Dept: LAB | Facility: LAB | Age: 74
End: 2024-02-07
Payer: MEDICARE

## 2024-02-07 ENCOUNTER — OFFICE VISIT (OUTPATIENT)
Dept: PRIMARY CARE | Facility: CLINIC | Age: 74
End: 2024-02-07
Payer: MEDICARE

## 2024-02-07 VITALS
DIASTOLIC BLOOD PRESSURE: 70 MMHG | SYSTOLIC BLOOD PRESSURE: 100 MMHG | BODY MASS INDEX: 23.86 KG/M2 | HEIGHT: 67 IN | WEIGHT: 152 LBS

## 2024-02-07 DIAGNOSIS — Z12.31 BREAST CANCER SCREENING BY MAMMOGRAM: ICD-10-CM

## 2024-02-07 DIAGNOSIS — E55.9 VITAMIN D DEFICIENCY: Primary | ICD-10-CM

## 2024-02-07 DIAGNOSIS — E55.9 VITAMIN D DEFICIENCY: ICD-10-CM

## 2024-02-07 DIAGNOSIS — E11.9 TYPE 2 DIABETES MELLITUS WITHOUT COMPLICATION, WITHOUT LONG-TERM CURRENT USE OF INSULIN (MULTI): ICD-10-CM

## 2024-02-07 DIAGNOSIS — I10 BENIGN ESSENTIAL HYPERTENSION: ICD-10-CM

## 2024-02-07 DIAGNOSIS — Z78.0 MENOPAUSE: ICD-10-CM

## 2024-02-07 DIAGNOSIS — I47.29 NON-SUSTAINED VENTRICULAR TACHYCARDIA (MULTI): ICD-10-CM

## 2024-02-07 DIAGNOSIS — F32.1 DEPRESSION, MAJOR, SINGLE EPISODE, MODERATE (MULTI): ICD-10-CM

## 2024-02-07 DIAGNOSIS — E08.21 DIABETES MELLITUS DUE TO UNDERLYING CONDITION WITH DIABETIC NEPHROPATHY, WITHOUT LONG-TERM CURRENT USE OF INSULIN (MULTI): ICD-10-CM

## 2024-02-07 DIAGNOSIS — N18.31 STAGE 3A CHRONIC KIDNEY DISEASE (MULTI): ICD-10-CM

## 2024-02-07 DIAGNOSIS — Z00.00 WELLNESS EXAMINATION: ICD-10-CM

## 2024-02-07 LAB
25(OH)D3 SERPL-MCNC: 61 NG/ML (ref 30–100)
ALBUMIN SERPL BCP-MCNC: 4.1 G/DL (ref 3.4–5)
ALP SERPL-CCNC: 45 U/L (ref 33–136)
ALT SERPL W P-5'-P-CCNC: 9 U/L (ref 7–45)
ANION GAP SERPL CALC-SCNC: 15 MMOL/L (ref 10–20)
AST SERPL W P-5'-P-CCNC: 14 U/L (ref 9–39)
BILIRUB SERPL-MCNC: 0.4 MG/DL (ref 0–1.2)
BUN SERPL-MCNC: 19 MG/DL (ref 6–23)
CALCIUM SERPL-MCNC: 9.3 MG/DL (ref 8.6–10.6)
CHLORIDE SERPL-SCNC: 99 MMOL/L (ref 98–107)
CHOLEST SERPL-MCNC: 114 MG/DL (ref 0–199)
CHOLESTEROL/HDL RATIO: 2
CO2 SERPL-SCNC: 27 MMOL/L (ref 21–32)
CREAT SERPL-MCNC: 1.23 MG/DL (ref 0.5–1.05)
CREAT UR-MCNC: 159.3 MG/DL (ref 20–320)
EGFRCR SERPLBLD CKD-EPI 2021: 46 ML/MIN/1.73M*2
ERYTHROCYTE [DISTWIDTH] IN BLOOD BY AUTOMATED COUNT: 13.3 % (ref 11.5–14.5)
EST. AVERAGE GLUCOSE BLD GHB EST-MCNC: 117 MG/DL
GLUCOSE SERPL-MCNC: 115 MG/DL (ref 74–99)
HBA1C MFR BLD: 5.7 %
HCT VFR BLD AUTO: 31.6 % (ref 36–46)
HDLC SERPL-MCNC: 55.8 MG/DL
HGB BLD-MCNC: 10.1 G/DL (ref 12–16)
LDLC SERPL CALC-MCNC: 42 MG/DL
MCH RBC QN AUTO: 28.1 PG (ref 26–34)
MCHC RBC AUTO-ENTMCNC: 32 G/DL (ref 32–36)
MCV RBC AUTO: 88 FL (ref 80–100)
MICROALBUMIN UR-MCNC: 21.2 MG/L
MICROALBUMIN/CREAT UR: 13.3 UG/MG CREAT
NON HDL CHOLESTEROL: 58 MG/DL (ref 0–149)
NRBC BLD-RTO: 0 /100 WBCS (ref 0–0)
PLATELET # BLD AUTO: 266 X10*3/UL (ref 150–450)
POTASSIUM SERPL-SCNC: 4.3 MMOL/L (ref 3.5–5.3)
PROT SERPL-MCNC: 6.6 G/DL (ref 6.4–8.2)
RBC # BLD AUTO: 3.6 X10*6/UL (ref 4–5.2)
SODIUM SERPL-SCNC: 137 MMOL/L (ref 136–145)
TRIGL SERPL-MCNC: 81 MG/DL (ref 0–149)
TSH SERPL-ACNC: 1.63 MIU/L (ref 0.44–3.98)
VLDL: 16 MG/DL (ref 0–40)
WBC # BLD AUTO: 4.2 X10*3/UL (ref 4.4–11.3)

## 2024-02-07 PROCEDURE — 84443 ASSAY THYROID STIM HORMONE: CPT

## 2024-02-07 PROCEDURE — 3048F LDL-C <100 MG/DL: CPT | Performed by: INTERNAL MEDICINE

## 2024-02-07 PROCEDURE — 80061 LIPID PANEL: CPT

## 2024-02-07 PROCEDURE — 82306 VITAMIN D 25 HYDROXY: CPT

## 2024-02-07 PROCEDURE — 1125F AMNT PAIN NOTED PAIN PRSNT: CPT | Performed by: INTERNAL MEDICINE

## 2024-02-07 PROCEDURE — 3078F DIAST BP <80 MM HG: CPT | Performed by: INTERNAL MEDICINE

## 2024-02-07 PROCEDURE — 3061F NEG MICROALBUMINURIA REV: CPT | Performed by: INTERNAL MEDICINE

## 2024-02-07 PROCEDURE — 3074F SYST BP LT 130 MM HG: CPT | Performed by: INTERNAL MEDICINE

## 2024-02-07 PROCEDURE — 1160F RVW MEDS BY RX/DR IN RCRD: CPT | Performed by: INTERNAL MEDICINE

## 2024-02-07 PROCEDURE — 1124F ACP DISCUSS-NO DSCNMKR DOCD: CPT | Performed by: INTERNAL MEDICINE

## 2024-02-07 PROCEDURE — 82043 UR ALBUMIN QUANTITATIVE: CPT

## 2024-02-07 PROCEDURE — 1159F MED LIST DOCD IN RCRD: CPT | Performed by: INTERNAL MEDICINE

## 2024-02-07 PROCEDURE — 1170F FXNL STATUS ASSESSED: CPT | Performed by: INTERNAL MEDICINE

## 2024-02-07 PROCEDURE — 1036F TOBACCO NON-USER: CPT | Performed by: INTERNAL MEDICINE

## 2024-02-07 PROCEDURE — 83036 HEMOGLOBIN GLYCOSYLATED A1C: CPT

## 2024-02-07 PROCEDURE — 4010F ACE/ARB THERAPY RXD/TAKEN: CPT | Performed by: INTERNAL MEDICINE

## 2024-02-07 PROCEDURE — G0439 PPPS, SUBSEQ VISIT: HCPCS | Performed by: INTERNAL MEDICINE

## 2024-02-07 PROCEDURE — 3044F HG A1C LEVEL LT 7.0%: CPT | Performed by: INTERNAL MEDICINE

## 2024-02-07 PROCEDURE — 80053 COMPREHEN METABOLIC PANEL: CPT

## 2024-02-07 PROCEDURE — 36415 COLL VENOUS BLD VENIPUNCTURE: CPT

## 2024-02-07 PROCEDURE — 99213 OFFICE O/P EST LOW 20 MIN: CPT | Performed by: INTERNAL MEDICINE

## 2024-02-07 PROCEDURE — 85027 COMPLETE CBC AUTOMATED: CPT

## 2024-02-07 PROCEDURE — 82570 ASSAY OF URINE CREATININE: CPT

## 2024-02-07 ASSESSMENT — ACTIVITIES OF DAILY LIVING (ADL)
GROCERY_SHOPPING: INDEPENDENT
DRESSING: INDEPENDENT
MANAGING_FINANCES: INDEPENDENT
DOING_HOUSEWORK: INDEPENDENT
TAKING_MEDICATION: INDEPENDENT
BATHING: INDEPENDENT

## 2024-02-07 ASSESSMENT — PATIENT HEALTH QUESTIONNAIRE - PHQ9
1. LITTLE INTEREST OR PLEASURE IN DOING THINGS: NOT AT ALL
SUM OF ALL RESPONSES TO PHQ9 QUESTIONS 1 AND 2: 0
2. FEELING DOWN, DEPRESSED OR HOPELESS: NOT AT ALL
SUM OF ALL RESPONSES TO PHQ9 QUESTIONS 1 AND 2: 0
10. IF YOU CHECKED OFF ANY PROBLEMS, HOW DIFFICULT HAVE THESE PROBLEMS MADE IT FOR YOU TO DO YOUR WORK, TAKE CARE OF THINGS AT HOME, OR GET ALONG WITH OTHER PEOPLE: NOT DIFFICULT AT ALL
1. LITTLE INTEREST OR PLEASURE IN DOING THINGS: NOT AT ALL
2. FEELING DOWN, DEPRESSED OR HOPELESS: NOT AT ALL

## 2024-02-07 ASSESSMENT — ENCOUNTER SYMPTOMS
LOSS OF SENSATION IN FEET: 0
DEPRESSION: 0
OCCASIONAL FEELINGS OF UNSTEADINESS: 0

## 2024-02-07 NOTE — PROGRESS NOTES
Medicare Wellness Billing Compliance Satisfied    *This is a visual tool to show completion of required items on the day of the visit. Green checks will only appear on the date of visit.    Review all medications by prescribing practitioner or clinical pharmacist (such as prescriptions, OTCs, herbal therapies and supplements) documented in the medical record    Past Medical, Surgical, and Family History reviewed and updated in chart    Tobacco Use Reviewed    Alcohol Use Reviewed    Illicit Drug Use Reviewed    PHQ2/9    Falls in Last Year Reviewed    Home Safety Risk Factors Reviewed    Cognitive Impairment Reviewed    Patient Self Assessment and Health Status    Current Diet Reviewed    Exercise Frequency    ADL - Hearing Impairment    ADL - Bathing    ADL - Dressing    ADL - Walks in Home    IADL - Managing Finances    IADL - Grocery Shopping    IADL - Taking Medications    IADL - Doing Housework    Subjective   Patient ID: Monica Trotter is a 73 y.o. female who presents for [facility].  Here for wellness exam.   Doing well overall.   Lost her  in 12/23. Has been grieving but no sxs of depression. Still exercising and taking care of herself. Sleeps well. No SI/HI. Good social supports.   Hernia is burdensome with pulling sensation and pain with exertion. Scheduled for repair in May. BM are stable, no constipation.   Is on ozempic and exercises regularly, has been losing weight intentionally.          Review of Systems   All other systems reviewed and are negative.      Objective   Physical Exam  Constitutional:       General: She is not in acute distress.     Appearance: Normal appearance.   HENT:      Head: Normocephalic and atraumatic.      Right Ear: Tympanic membrane, ear canal and external ear normal.      Left Ear: Tympanic membrane, ear canal and external ear normal.      Nose: Nose normal.      Mouth/Throat:      Mouth: Mucous membranes are moist.      Pharynx: Oropharynx is clear.  "  Eyes:      General: No scleral icterus.     Extraocular Movements: Extraocular movements intact.      Conjunctiva/sclera: Conjunctivae normal.      Pupils: Pupils are equal, round, and reactive to light.   Cardiovascular:      Rate and Rhythm: Normal rate and regular rhythm.      Pulses: Normal pulses.      Heart sounds: Normal heart sounds. No murmur heard.     No gallop.   Pulmonary:      Effort: Pulmonary effort is normal.      Breath sounds: Normal breath sounds.   Abdominal:      General: Abdomen is flat. Bowel sounds are normal.      Palpations: Abdomen is soft.      Tenderness: There is no abdominal tenderness. There is no guarding or rebound.   Musculoskeletal:         General: Normal range of motion.      Cervical back: Normal range of motion and neck supple.      Right lower leg: No edema.      Left lower leg: No edema.   Skin:     General: Skin is warm and dry.      Capillary Refill: Capillary refill takes less than 2 seconds.      Coloration: Skin is not jaundiced or pale.      Findings: No bruising, erythema, lesion or rash.   Neurological:      General: No focal deficit present.      Mental Status: She is alert and oriented to person, place, and time. Mental status is at baseline.      Cranial Nerves: No cranial nerve deficit.      Sensory: No sensory deficit.      Motor: No weakness.      Coordination: Coordination normal.      Deep Tendon Reflexes: Reflexes normal.   Psychiatric:         Mood and Affect: Mood normal.         Behavior: Behavior normal.       /70 (BP Location: Right arm, Patient Position: Sitting, BP Cuff Size: Adult)   Ht 1.702 m (5' 7\")   Wt 68.9 kg (152 lb)   BMI 23.81 kg/m²      Assessment/Plan   Problem List Items Addressed This Visit       Benign essential hypertension    Relevant Orders    Albumin , Urine Random    CBC    Comprehensive Metabolic Panel    TSH with reflex to Free T4 if abnormal    Lipid Panel    Diabetes mellitus (CMS/HCC)    Relevant Orders    Albumin " , Urine Random    Hemoglobin A1C    Referral to Podiatry    Vitamin D deficiency - Primary    Relevant Orders    Vitamin D 25-Hydroxy,Total (for eval of Vitamin D levels)     Other Visit Diagnoses       Wellness examination        Menopause        Relevant Orders    XR DEXA bone density    Breast cancer screening by mammogram        Relevant Orders    BI mammo bilateral screening tomosynthesis            Monica Trotter here for wellness visit     #DM  a1c 6.1  continue metformin, ozempic   Eye exam scheduled, ordered podiatry referral     HTN, HLD   continue chlorthalidone, lisinopril, statin     #lumbar DJD: spinal stenosis and foramina narrowing   Stable     #Hernia   Scheduled for surgery in May      labs ordered   cancer screen: mammo ordered, cscope due 2027  DEXA ordered   Immunizations: UTD     Rtc in 4months

## 2024-02-07 NOTE — PROGRESS NOTES
Chief Complaint   Patient presents with     Pain     Pt here to discuss multiple concerns.   I saw and evaluated the patient. I personally obtained the key and critical portions of the history and physical exam or was physically present for key and critical portions performed by the resident/fellow. I reviewed the resident/fellow's documentation and discussed the patient with the resident/fellow. I agree with the resident/fellow's medical decision making as documented in the note.    Libra Ramos MD

## 2024-02-08 PROBLEM — Z00.00 WELLNESS EXAMINATION: Status: ACTIVE | Noted: 2024-02-08

## 2024-02-08 PROBLEM — Z12.31 BREAST CANCER SCREENING BY MAMMOGRAM: Status: ACTIVE | Noted: 2024-02-08

## 2024-02-08 PROBLEM — Z78.0 MENOPAUSE: Status: ACTIVE | Noted: 2024-02-08

## 2024-02-20 ENCOUNTER — APPOINTMENT (OUTPATIENT)
Dept: OPHTHALMOLOGY | Facility: CLINIC | Age: 74
End: 2024-02-20
Payer: MEDICARE

## 2024-02-26 ENCOUNTER — TELEPHONE (OUTPATIENT)
Dept: PRIMARY CARE | Facility: CLINIC | Age: 74
End: 2024-02-26

## 2024-02-26 ENCOUNTER — OFFICE VISIT (OUTPATIENT)
Dept: OPHTHALMOLOGY | Facility: CLINIC | Age: 74
End: 2024-02-26
Payer: MEDICARE

## 2024-02-26 DIAGNOSIS — H52.223 MYOPIA OF BOTH EYES WITH REGULAR ASTIGMATISM: ICD-10-CM

## 2024-02-26 DIAGNOSIS — H40.003 GLAUCOMA SUSPECT OF BOTH EYES: ICD-10-CM

## 2024-02-26 DIAGNOSIS — H52.13 MYOPIA OF BOTH EYES WITH REGULAR ASTIGMATISM: ICD-10-CM

## 2024-02-26 DIAGNOSIS — H25.813 COMBINED FORMS OF AGE-RELATED CATARACT OF BOTH EYES: Primary | ICD-10-CM

## 2024-02-26 DIAGNOSIS — H52.4 PRESBYOPIA: ICD-10-CM

## 2024-02-26 DIAGNOSIS — E11.9 DIABETES MELLITUS WITHOUT COMPLICATION (MULTI): ICD-10-CM

## 2024-02-26 DIAGNOSIS — H50.10 EXOTROPIA, BOTH EYES: ICD-10-CM

## 2024-02-26 LAB
AVERAGE RNFL BASELINE (OD): 94 UM
AVERAGE RNFL BASELINE (OS): 85 UM

## 2024-02-26 PROCEDURE — 92015 DETERMINE REFRACTIVE STATE: CPT | Performed by: OPTOMETRIST

## 2024-02-26 PROCEDURE — 92133 CPTRZD OPH DX IMG PST SGM ON: CPT | Performed by: OPTOMETRIST

## 2024-02-26 PROCEDURE — 99214 OFFICE O/P EST MOD 30 MIN: CPT | Performed by: OPTOMETRIST

## 2024-02-26 ASSESSMENT — TONOMETRY
OS_IOP_MMHG: 17
IOP_METHOD: GOLDMANN APPLANATION
OD_IOP_MMHG: 17

## 2024-02-26 ASSESSMENT — ENCOUNTER SYMPTOMS
GASTROINTESTINAL NEGATIVE: 0
CONSTITUTIONAL NEGATIVE: 0
CARDIOVASCULAR NEGATIVE: 0
PSYCHIATRIC NEGATIVE: 0
ENDOCRINE NEGATIVE: 0
MUSCULOSKELETAL NEGATIVE: 0
RESPIRATORY NEGATIVE: 0
HEMATOLOGIC/LYMPHATIC NEGATIVE: 0
NEUROLOGICAL NEGATIVE: 0
ALLERGIC/IMMUNOLOGIC NEGATIVE: 0
EYES NEGATIVE: 1

## 2024-02-26 ASSESSMENT — REFRACTION_MANIFEST
OD_ADD: +2.75
OS_ADD: +2.75
OS_SPHERE: +0.50
OS_AXIS: 092
OD_SPHERE: -1.25
OD_CYLINDER: -2.00
OD_AXIS: 092
OS_CYLINDER: -2.50

## 2024-02-26 ASSESSMENT — CONF VISUAL FIELD
OS_SUPERIOR_NASAL_RESTRICTION: 0
OD_SUPERIOR_NASAL_RESTRICTION: 0
OD_SUPERIOR_TEMPORAL_RESTRICTION: 0
OD_INFERIOR_TEMPORAL_RESTRICTION: 0
OS_INFERIOR_NASAL_RESTRICTION: 0
OD_NORMAL: 1
OS_INFERIOR_TEMPORAL_RESTRICTION: 0
OS_SUPERIOR_TEMPORAL_RESTRICTION: 0
OD_INFERIOR_NASAL_RESTRICTION: 0
OS_NORMAL: 1

## 2024-02-26 ASSESSMENT — VISUAL ACUITY
OD_CC+: -2
OS_CC: 20/20-2
OS_CC+: -2
OD_CC: 20/20-2
METHOD: SNELLEN - LINEAR
OS_CC: 20/25
OD_CC: 20/25

## 2024-02-26 ASSESSMENT — SLIT LAMP EXAM - LIDS
COMMENTS: NORMAL
COMMENTS: NORMAL

## 2024-02-26 ASSESSMENT — REFRACTION_WEARINGRX
OD_SPHERE: -2.00
OS_AXIS: 087
OS_CYLINDER: -2.00
OD_CYLINDER: -1.75
OD_AXIS: 090
OS_ADD: +2.75
OS_SPHERE: -0.25
OD_ADD: +2.75

## 2024-02-26 ASSESSMENT — CUP TO DISC RATIO
OD_RATIO: 0.65
OS_RATIO: 0.75

## 2024-02-26 ASSESSMENT — EXTERNAL EXAM - LEFT EYE: OS_EXAM: NORMAL

## 2024-02-26 ASSESSMENT — PACHYMETRY
OD_CT(UM): 598
OS_CT(UM): 615

## 2024-02-26 ASSESSMENT — EXTERNAL EXAM - RIGHT EYE: OD_EXAM: NORMAL

## 2024-02-26 NOTE — TELEPHONE ENCOUNTER
Having hernia surgery in May. Is having increased pain and discomfort from it. Thinks that the surgery should be soon. She cannot get in touch with anyone from Dr. Junior's office. Asking if we can refer her to someone else that can do it sooner?

## 2024-02-26 NOTE — PROGRESS NOTES
Assessment/Plan   Diagnoses and all orders for this visit:  Combined forms of age-related cataract of both eyes  -Patient's cataracts are visually significant. Recommend cataract evaluation for surgery. Patient agrees. Referral placed with Dr. Reddy.    Diabetes mellitus without complication (CMS/[facility])  -The patient has diabetes without any evidence of retinopathy.  The patient was advised to maintain tight glucose control, tight blood pressure control, and favorable levels of cholesterol, low density lipoprotein, and high density lipoproteins.  Follow up in one year was recommended.   Glaucoma suspect of both eyes  -     OCT, Optic Nerve - OU - Both Eyes  - Glaucoma suspect - cupping   Patient is a glaucoma suspect secondary to increased cup-to-disc ratio.   -Normotensive intraocular pressure (IOP) today and wnl OCT ONH. Monitor yearly with Briceño visual field (HVF) 24-2 and OCT ONH.   Exotropia, both eyes  -Pt symptomatic of having difficulty while focusing on near tasks. Discussed tx options (prism, sx and vision therapy). Ed pt that she would not be a good candidate for prism due to alternating nature. Re-evaluate status post (s/p) cataract sx. Pt would be interested in vision therapy.   Myopia of both eyes with regular astigmatism  Presbyopia  -Released PAL rx per pt request d/t current glasses being broken. Emphasized that glasses will change after cataract sx. Pt understood.

## 2024-03-27 ENCOUNTER — OFFICE VISIT (OUTPATIENT)
Dept: PRIMARY CARE | Facility: CLINIC | Age: 74
End: 2024-03-27
Payer: MEDICARE

## 2024-03-27 ENCOUNTER — HOSPITAL ENCOUNTER (OUTPATIENT)
Dept: RADIOLOGY | Facility: HOSPITAL | Age: 74
Discharge: HOME | End: 2024-03-27
Payer: MEDICARE

## 2024-03-27 VITALS — SYSTOLIC BLOOD PRESSURE: 110 MMHG | DIASTOLIC BLOOD PRESSURE: 64 MMHG | WEIGHT: 152 LBS | BODY MASS INDEX: 23.81 KG/M2

## 2024-03-27 DIAGNOSIS — E78.00 PURE HYPERCHOLESTEROLEMIA: ICD-10-CM

## 2024-03-27 DIAGNOSIS — M54.40 ACUTE LOW BACK PAIN WITH SCIATICA, SCIATICA LATERALITY UNSPECIFIED, UNSPECIFIED BACK PAIN LATERALITY: ICD-10-CM

## 2024-03-27 DIAGNOSIS — I45.10 RIGHT BUNDLE BRANCH BLOCK (RBBB): ICD-10-CM

## 2024-03-27 DIAGNOSIS — M54.40 ACUTE LOW BACK PAIN WITH SCIATICA, SCIATICA LATERALITY UNSPECIFIED, UNSPECIFIED BACK PAIN LATERALITY: Primary | ICD-10-CM

## 2024-03-27 DIAGNOSIS — E55.9 VITAMIN D DEFICIENCY: ICD-10-CM

## 2024-03-27 PROCEDURE — 1159F MED LIST DOCD IN RCRD: CPT | Performed by: INTERNAL MEDICINE

## 2024-03-27 PROCEDURE — 1036F TOBACCO NON-USER: CPT | Performed by: INTERNAL MEDICINE

## 2024-03-27 PROCEDURE — 99214 OFFICE O/P EST MOD 30 MIN: CPT | Performed by: INTERNAL MEDICINE

## 2024-03-27 PROCEDURE — 3074F SYST BP LT 130 MM HG: CPT | Performed by: INTERNAL MEDICINE

## 2024-03-27 PROCEDURE — 4010F ACE/ARB THERAPY RXD/TAKEN: CPT | Performed by: INTERNAL MEDICINE

## 2024-03-27 PROCEDURE — 72072 X-RAY EXAM THORAC SPINE 3VWS: CPT

## 2024-03-27 PROCEDURE — 72110 X-RAY EXAM L-2 SPINE 4/>VWS: CPT

## 2024-03-27 PROCEDURE — 72072 X-RAY EXAM THORAC SPINE 3VWS: CPT | Performed by: RADIOLOGY

## 2024-03-27 PROCEDURE — 3061F NEG MICROALBUMINURIA REV: CPT | Performed by: INTERNAL MEDICINE

## 2024-03-27 PROCEDURE — 3044F HG A1C LEVEL LT 7.0%: CPT | Performed by: INTERNAL MEDICINE

## 2024-03-27 PROCEDURE — 1160F RVW MEDS BY RX/DR IN RCRD: CPT | Performed by: INTERNAL MEDICINE

## 2024-03-27 PROCEDURE — 3078F DIAST BP <80 MM HG: CPT | Performed by: INTERNAL MEDICINE

## 2024-03-27 PROCEDURE — 72110 X-RAY EXAM L-2 SPINE 4/>VWS: CPT | Performed by: RADIOLOGY

## 2024-03-27 PROCEDURE — 3048F LDL-C <100 MG/DL: CPT | Performed by: INTERNAL MEDICINE

## 2024-03-27 RX ORDER — CYCLOBENZAPRINE HCL 5 MG
5 TABLET ORAL NIGHTLY PRN
Qty: 30 TABLET | Refills: 0 | Status: SHIPPED | OUTPATIENT
Start: 2024-03-27 | End: 2024-04-04 | Stop reason: SDUPTHER

## 2024-03-27 ASSESSMENT — ENCOUNTER SYMPTOMS
HEMATOLOGIC/LYMPHATIC NEGATIVE: 1
CARDIOVASCULAR NEGATIVE: 1
RESPIRATORY NEGATIVE: 1
CONSTITUTIONAL NEGATIVE: 1
PSYCHIATRIC NEGATIVE: 1
MYALGIAS: 1
GASTROINTESTINAL NEGATIVE: 1
BACK PAIN: 1

## 2024-03-27 ASSESSMENT — PATIENT HEALTH QUESTIONNAIRE - PHQ9
1. LITTLE INTEREST OR PLEASURE IN DOING THINGS: NOT AT ALL
2. FEELING DOWN, DEPRESSED OR HOPELESS: NOT AT ALL
SUM OF ALL RESPONSES TO PHQ9 QUESTIONS 1 AND 2: 0

## 2024-03-27 NOTE — PROGRESS NOTES
I reviewed the resident/fellow's documentation and discussed the patient with the resident/fellow. I agree with the resident/fellow's medical decision making as documented in the note.  As the attending physician, I certify that I personally reviewed the patient's history and personally examined the patient to confirm the physical findings described above, and that I reviewed the relevant imaging studies and available reports. I also discussed the differential diagnosis and all of the proposed management plans with the patient and individuals accompanying the patient to this visit. They had the opportunity to ask questions about the proposed management plans and to have those questions answered.     Libra Ramos MD

## 2024-03-27 NOTE — PROGRESS NOTES
Subjective   Patient ID: Monica Trotter is a 73 y.o. female who presents for No chief complaint on file..    HPI   73yr old  female with PMHx of HTN, HLD, T2DM, DJD/OA. She presented for check up and reevaluation. Does not smoke and does not abuse ETOH. Vitals all within normal limits. She presented also complaining of lower back pain. Pain is localized to the T11-T12 vertebrae. Pain was noted 3 months prior at the time patient started her water therapy exercise. Exercises involve whole immersion and twist/turns of the torso. Pain was initially intermittent, but became constant in the past 2 weeks prior to index presentation. It is graded at a 6/10 at its peak. Pain is non radiating, has no aggravating factor but partially relieved by tylenol. She has no hx of back trauma. Has no hx of back pain, has no FUN symptoms suggestive of pyelonephritis.    Review of Systems   Constitutional: Negative.    Respiratory: Negative.     Cardiovascular: Negative.    Gastrointestinal: Negative.    Genitourinary: Negative.    Musculoskeletal:  Positive for back pain and myalgias.   Skin: Negative.    Hematological: Negative.    Psychiatric/Behavioral: Negative.         Objective   /64   Wt 68.9 kg (152 lb)   BMI 23.81 kg/m²     Physical Exam  Constitutional:       Appearance: Normal appearance.   Cardiovascular:      Rate and Rhythm: Normal rate and regular rhythm.   Pulmonary:      Effort: Pulmonary effort is normal.      Breath sounds: Normal breath sounds.   Abdominal:      General: Abdomen is flat.      Palpations: Abdomen is soft.   Musculoskeletal:         General: No tenderness.      Thoracic back: No swelling, deformity, tenderness or bony tenderness. No scoliosis.   Skin:     General: Skin is dry.   Neurological:      Mental Status: She is alert and oriented to person, place, and time.         Assessment/Plan   Problem List Items Addressed This Visit             ICD-10-CM    Acute low back pain -  Primary M54.50    Relevant Medications    cyclobenzaprine (Flexeril) 5 mg tablet    Other Relevant Orders    XR thoracic spine 2 views    XR lumbar spine 2-3 views  -Long discussion with patient on likely causes causes of back pain, we suspect this may be related to a sprain from the twisting motions during her exercise  - Advised to stop twists and turns, may do leg and arm exercises  - We will obtain xray of the t and l spine to rule out compression  - may use flexeril qnightly prn for spasm    Health Maintenance  -Patient is UTD with labs  -She is yet to obtain a dexa scan  -UTD with labs and mammo

## 2024-04-03 DIAGNOSIS — M25.562 CHRONIC PAIN OF BOTH KNEES: ICD-10-CM

## 2024-04-03 DIAGNOSIS — G89.29 CHRONIC PAIN OF BOTH KNEES: ICD-10-CM

## 2024-04-03 DIAGNOSIS — M25.561 CHRONIC PAIN OF BOTH KNEES: ICD-10-CM

## 2024-04-03 DIAGNOSIS — M85.88 OSTEOPENIA OF LUMBAR SPINE: ICD-10-CM

## 2024-04-03 DIAGNOSIS — M25.552 HIP PAIN, ACUTE, LEFT: ICD-10-CM

## 2024-04-04 DIAGNOSIS — M54.40 ACUTE LOW BACK PAIN WITH SCIATICA, SCIATICA LATERALITY UNSPECIFIED, UNSPECIFIED BACK PAIN LATERALITY: ICD-10-CM

## 2024-04-04 RX ORDER — CYCLOBENZAPRINE HCL 5 MG
5 TABLET ORAL NIGHTLY PRN
Qty: 90 TABLET | Refills: 0 | Status: SHIPPED | OUTPATIENT
Start: 2024-04-04 | End: 2024-04-09 | Stop reason: SDUPTHER

## 2024-04-09 DIAGNOSIS — M54.40 ACUTE LOW BACK PAIN WITH SCIATICA, SCIATICA LATERALITY UNSPECIFIED, UNSPECIFIED BACK PAIN LATERALITY: ICD-10-CM

## 2024-04-09 DIAGNOSIS — E11.9 TYPE 2 DIABETES MELLITUS WITHOUT COMPLICATION, WITHOUT LONG-TERM CURRENT USE OF INSULIN (MULTI): ICD-10-CM

## 2024-04-09 RX ORDER — SEMAGLUTIDE 1.34 MG/ML
1 INJECTION, SOLUTION SUBCUTANEOUS
Qty: 12 ML | Refills: 0 | Status: SHIPPED | OUTPATIENT
Start: 2024-04-14 | End: 2024-04-25 | Stop reason: SDUPTHER

## 2024-04-09 RX ORDER — CYCLOBENZAPRINE HCL 5 MG
5 TABLET ORAL NIGHTLY PRN
Qty: 90 TABLET | Refills: 0 | Status: SHIPPED | OUTPATIENT
Start: 2024-04-09

## 2024-04-18 ENCOUNTER — APPOINTMENT (OUTPATIENT)
Dept: OPHTHALMOLOGY | Facility: CLINIC | Age: 74
End: 2024-04-18
Payer: MEDICARE

## 2024-04-25 DIAGNOSIS — E11.9 TYPE 2 DIABETES MELLITUS WITHOUT COMPLICATION, WITHOUT LONG-TERM CURRENT USE OF INSULIN (MULTI): ICD-10-CM

## 2024-04-26 ENCOUNTER — TELEPHONE (OUTPATIENT)
Dept: SURGERY | Facility: CLINIC | Age: 74
End: 2024-04-26
Payer: MEDICARE

## 2024-04-26 RX ORDER — SEMAGLUTIDE 1.34 MG/ML
1 INJECTION, SOLUTION SUBCUTANEOUS
Qty: 3 ML | Refills: 0 | Status: SHIPPED | OUTPATIENT
Start: 2024-04-28 | End: 2024-05-22 | Stop reason: ALTCHOICE

## 2024-04-26 NOTE — TELEPHONE ENCOUNTER
Patient called stating that she did need prescription for Hibiclens soap.  I informed patient that she can purchase OTC at her local drug store. Patient understood.

## 2024-05-01 ENCOUNTER — ANESTHESIA EVENT (OUTPATIENT)
Dept: OPERATING ROOM | Facility: HOSPITAL | Age: 74
End: 2024-05-01
Payer: MEDICARE

## 2024-05-02 ENCOUNTER — ANESTHESIA (OUTPATIENT)
Dept: OPERATING ROOM | Facility: HOSPITAL | Age: 74
End: 2024-05-02
Payer: MEDICARE

## 2024-05-02 ENCOUNTER — HOSPITAL ENCOUNTER (OUTPATIENT)
Facility: HOSPITAL | Age: 74
Setting detail: OUTPATIENT SURGERY
Discharge: HOME | End: 2024-05-02
Attending: SURGERY | Admitting: SURGERY
Payer: MEDICARE

## 2024-05-02 VITALS
TEMPERATURE: 97.7 F | HEIGHT: 67 IN | OXYGEN SATURATION: 94 % | RESPIRATION RATE: 15 BRPM | SYSTOLIC BLOOD PRESSURE: 118 MMHG | WEIGHT: 150.13 LBS | HEART RATE: 97 BPM | DIASTOLIC BLOOD PRESSURE: 59 MMHG | BODY MASS INDEX: 23.56 KG/M2

## 2024-05-02 DIAGNOSIS — K40.91 UNILATERAL INGUINAL HERNIA WITHOUT OBSTRUCTION OR GANGRENE, RECURRENT: Primary | ICD-10-CM

## 2024-05-02 LAB
GLUCOSE BLD MANUAL STRIP-MCNC: 204 MG/DL (ref 74–99)
GLUCOSE BLD MANUAL STRIP-MCNC: 96 MG/DL (ref 74–99)

## 2024-05-02 PROCEDURE — 3700000001 HC GENERAL ANESTHESIA TIME - INITIAL BASE CHARGE: Performed by: SURGERY

## 2024-05-02 PROCEDURE — 2500000005 HC RX 250 GENERAL PHARMACY W/O HCPCS: Performed by: SURGERY

## 2024-05-02 PROCEDURE — 82947 ASSAY GLUCOSE BLOOD QUANT: CPT

## 2024-05-02 PROCEDURE — 2720000007 HC OR 272 NO HCPCS: Performed by: SURGERY

## 2024-05-02 PROCEDURE — 2500000005 HC RX 250 GENERAL PHARMACY W/O HCPCS: Performed by: ANESTHESIOLOGY

## 2024-05-02 PROCEDURE — 99100 ANES PT EXTEME AGE<1 YR&>70: CPT | Performed by: ANESTHESIOLOGY

## 2024-05-02 PROCEDURE — 2500000004 HC RX 250 GENERAL PHARMACY W/ HCPCS (ALT 636 FOR OP/ED): Performed by: ANESTHESIOLOGY

## 2024-05-02 PROCEDURE — 7100000001 HC RECOVERY ROOM TIME - INITIAL BASE CHARGE: Performed by: SURGERY

## 2024-05-02 PROCEDURE — C1781 MESH (IMPLANTABLE): HCPCS | Performed by: SURGERY

## 2024-05-02 PROCEDURE — 49650 LAP ING HERNIA REPAIR INIT: CPT | Performed by: SURGERY

## 2024-05-02 PROCEDURE — 2500000004 HC RX 250 GENERAL PHARMACY W/ HCPCS (ALT 636 FOR OP/ED): Performed by: NURSE ANESTHETIST, CERTIFIED REGISTERED

## 2024-05-02 PROCEDURE — 3600000009 HC OR TIME - EACH INCREMENTAL 1 MINUTE - PROCEDURE LEVEL FOUR: Performed by: SURGERY

## 2024-05-02 PROCEDURE — A49650 PR LAP,INGUINAL HERNIA REPR,INITIAL: Performed by: ANESTHESIOLOGY

## 2024-05-02 PROCEDURE — 3600000004 HC OR TIME - INITIAL BASE CHARGE - PROCEDURE LEVEL FOUR: Performed by: SURGERY

## 2024-05-02 PROCEDURE — 7100000010 HC PHASE TWO TIME - EACH INCREMENTAL 1 MINUTE: Performed by: SURGERY

## 2024-05-02 PROCEDURE — 7100000009 HC PHASE TWO TIME - INITIAL BASE CHARGE: Performed by: SURGERY

## 2024-05-02 PROCEDURE — 3700000002 HC GENERAL ANESTHESIA TIME - EACH INCREMENTAL 1 MINUTE: Performed by: SURGERY

## 2024-05-02 PROCEDURE — A49650 PR LAP,INGUINAL HERNIA REPR,INITIAL: Performed by: NURSE ANESTHETIST, CERTIFIED REGISTERED

## 2024-05-02 PROCEDURE — 2500000005 HC RX 250 GENERAL PHARMACY W/O HCPCS: Performed by: NURSE ANESTHETIST, CERTIFIED REGISTERED

## 2024-05-02 PROCEDURE — 2780000003 HC OR 278 NO HCPCS: Performed by: SURGERY

## 2024-05-02 PROCEDURE — 7100000002 HC RECOVERY ROOM TIME - EACH INCREMENTAL 1 MINUTE: Performed by: SURGERY

## 2024-05-02 PROCEDURE — S2900 ROBOTIC SURGICAL SYSTEM: HCPCS | Performed by: SURGERY

## 2024-05-02 DEVICE — MESH, 3DMAX MID, 4 X 6 IN, LARGE RIGHT: Type: IMPLANTABLE DEVICE | Site: ABDOMEN | Status: FUNCTIONAL

## 2024-05-02 RX ORDER — OXYCODONE HYDROCHLORIDE 5 MG/1
5 TABLET ORAL EVERY 6 HOURS PRN
Qty: 20 TABLET | Refills: 0 | Status: SHIPPED | OUTPATIENT
Start: 2024-05-02 | End: 2024-05-07

## 2024-05-02 RX ORDER — SODIUM CHLORIDE, SODIUM LACTATE, POTASSIUM CHLORIDE, CALCIUM CHLORIDE 600; 310; 30; 20 MG/100ML; MG/100ML; MG/100ML; MG/100ML
100 INJECTION, SOLUTION INTRAVENOUS CONTINUOUS
Status: DISCONTINUED | OUTPATIENT
Start: 2024-05-02 | End: 2024-05-02 | Stop reason: HOSPADM

## 2024-05-02 RX ORDER — SUCCINYLCHOLINE CHLORIDE 20 MG/ML
INJECTION INTRAMUSCULAR; INTRAVENOUS AS NEEDED
Status: DISCONTINUED | OUTPATIENT
Start: 2024-05-02 | End: 2024-05-02

## 2024-05-02 RX ORDER — HYDRALAZINE HYDROCHLORIDE 20 MG/ML
5 INJECTION INTRAMUSCULAR; INTRAVENOUS EVERY 30 MIN PRN
Status: DISCONTINUED | OUTPATIENT
Start: 2024-05-02 | End: 2024-05-02 | Stop reason: HOSPADM

## 2024-05-02 RX ORDER — OXYCODONE HYDROCHLORIDE 5 MG/1
5 TABLET ORAL EVERY 4 HOURS PRN
Status: DISCONTINUED | OUTPATIENT
Start: 2024-05-02 | End: 2024-05-02 | Stop reason: HOSPADM

## 2024-05-02 RX ORDER — MIDAZOLAM HYDROCHLORIDE 1 MG/ML
INJECTION INTRAMUSCULAR; INTRAVENOUS AS NEEDED
Status: DISCONTINUED | OUTPATIENT
Start: 2024-05-02 | End: 2024-05-02

## 2024-05-02 RX ORDER — ONDANSETRON HYDROCHLORIDE 2 MG/ML
INJECTION, SOLUTION INTRAVENOUS AS NEEDED
Status: DISCONTINUED | OUTPATIENT
Start: 2024-05-02 | End: 2024-05-02

## 2024-05-02 RX ORDER — ALBUTEROL SULFATE 0.83 MG/ML
2.5 SOLUTION RESPIRATORY (INHALATION) ONCE AS NEEDED
Status: DISCONTINUED | OUTPATIENT
Start: 2024-05-02 | End: 2024-05-02 | Stop reason: HOSPADM

## 2024-05-02 RX ORDER — SODIUM CHLORIDE 0.9 G/100ML
IRRIGANT IRRIGATION AS NEEDED
Status: DISCONTINUED | OUTPATIENT
Start: 2024-05-02 | End: 2024-05-02 | Stop reason: HOSPADM

## 2024-05-02 RX ORDER — ACETAMINOPHEN 325 MG/1
975 TABLET ORAL ONCE
Status: COMPLETED | OUTPATIENT
Start: 2024-05-02 | End: 2024-05-02

## 2024-05-02 RX ORDER — PROPOFOL 10 MG/ML
INJECTION, EMULSION INTRAVENOUS AS NEEDED
Status: DISCONTINUED | OUTPATIENT
Start: 2024-05-02 | End: 2024-05-02

## 2024-05-02 RX ORDER — BUPIVACAINE HYDROCHLORIDE 5 MG/ML
INJECTION, SOLUTION PERINEURAL AS NEEDED
Status: DISCONTINUED | OUTPATIENT
Start: 2024-05-02 | End: 2024-05-02 | Stop reason: HOSPADM

## 2024-05-02 RX ORDER — CEFAZOLIN 1 G/1
INJECTION, POWDER, FOR SOLUTION INTRAVENOUS AS NEEDED
Status: DISCONTINUED | OUTPATIENT
Start: 2024-05-02 | End: 2024-05-02

## 2024-05-02 RX ORDER — PHENYLEPHRINE HCL IN 0.9% NACL 1 MG/10 ML
SYRINGE (ML) INTRAVENOUS AS NEEDED
Status: DISCONTINUED | OUTPATIENT
Start: 2024-05-02 | End: 2024-05-02

## 2024-05-02 RX ORDER — NORETHINDRONE AND ETHINYL ESTRADIOL 0.5-0.035
KIT ORAL AS NEEDED
Status: DISCONTINUED | OUTPATIENT
Start: 2024-05-02 | End: 2024-05-02

## 2024-05-02 RX ORDER — DIPHENHYDRAMINE HYDROCHLORIDE 50 MG/ML
12.5 INJECTION INTRAMUSCULAR; INTRAVENOUS ONCE AS NEEDED
Status: DISCONTINUED | OUTPATIENT
Start: 2024-05-02 | End: 2024-05-02 | Stop reason: HOSPADM

## 2024-05-02 RX ORDER — ONDANSETRON HYDROCHLORIDE 2 MG/ML
4 INJECTION, SOLUTION INTRAVENOUS ONCE AS NEEDED
Status: DISCONTINUED | OUTPATIENT
Start: 2024-05-02 | End: 2024-05-02 | Stop reason: HOSPADM

## 2024-05-02 RX ORDER — ROCURONIUM BROMIDE 10 MG/ML
INJECTION, SOLUTION INTRAVENOUS AS NEEDED
Status: DISCONTINUED | OUTPATIENT
Start: 2024-05-02 | End: 2024-05-02

## 2024-05-02 RX ORDER — FENTANYL CITRATE 50 UG/ML
INJECTION, SOLUTION INTRAMUSCULAR; INTRAVENOUS AS NEEDED
Status: DISCONTINUED | OUTPATIENT
Start: 2024-05-02 | End: 2024-05-02

## 2024-05-02 RX ORDER — LIDOCAINE HYDROCHLORIDE 10 MG/ML
INJECTION INFILTRATION; PERINEURAL AS NEEDED
Status: DISCONTINUED | OUTPATIENT
Start: 2024-05-02 | End: 2024-05-02 | Stop reason: HOSPADM

## 2024-05-02 RX ORDER — ESMOLOL HYDROCHLORIDE 10 MG/ML
INJECTION INTRAVENOUS AS NEEDED
Status: DISCONTINUED | OUTPATIENT
Start: 2024-05-02 | End: 2024-05-02

## 2024-05-02 RX ADMIN — ACETAMINOPHEN 975 MG: 325 TABLET ORAL at 06:51

## 2024-05-02 RX ADMIN — SODIUM CHLORIDE, POTASSIUM CHLORIDE, SODIUM LACTATE AND CALCIUM CHLORIDE: 600; 310; 30; 20 INJECTION, SOLUTION INTRAVENOUS at 09:10

## 2024-05-02 RX ADMIN — Medication 100 MCG: at 08:56

## 2024-05-02 RX ADMIN — EPHEDRINE SULFATE 10 MG: 50 INJECTION, SOLUTION INTRAVENOUS at 10:09

## 2024-05-02 RX ADMIN — ROCURONIUM 10 MG: 100 INJECTION, SOLUTION INTRAVENOUS at 08:28

## 2024-05-02 RX ADMIN — FENTANYL CITRATE 100 MCG: 50 INJECTION, SOLUTION INTRAMUSCULAR; INTRAVENOUS at 07:44

## 2024-05-02 RX ADMIN — SODIUM CHLORIDE, POTASSIUM CHLORIDE, SODIUM LACTATE AND CALCIUM CHLORIDE 100 ML/HR: 600; 310; 30; 20 INJECTION, SOLUTION INTRAVENOUS at 06:57

## 2024-05-02 RX ADMIN — EPHEDRINE SULFATE 10 MG: 50 INJECTION, SOLUTION INTRAVENOUS at 08:38

## 2024-05-02 RX ADMIN — ONDANSETRON 4 MG: 2 INJECTION INTRAMUSCULAR; INTRAVENOUS at 10:05

## 2024-05-02 RX ADMIN — ROCURONIUM 10 MG: 100 INJECTION, SOLUTION INTRAVENOUS at 08:49

## 2024-05-02 RX ADMIN — SODIUM CHLORIDE, POTASSIUM CHLORIDE, SODIUM LACTATE AND CALCIUM CHLORIDE: 600; 310; 30; 20 INJECTION, SOLUTION INTRAVENOUS at 07:21

## 2024-05-02 RX ADMIN — PROPOFOL 150 MG: 10 INJECTION, EMULSION INTRAVENOUS at 07:44

## 2024-05-02 RX ADMIN — EPHEDRINE SULFATE 10 MG: 50 INJECTION, SOLUTION INTRAVENOUS at 08:48

## 2024-05-02 RX ADMIN — Medication 200 MCG: at 08:14

## 2024-05-02 RX ADMIN — Medication 100 MCG: at 09:38

## 2024-05-02 RX ADMIN — MIDAZOLAM HYDROCHLORIDE 2 MG: 1 INJECTION, SOLUTION INTRAMUSCULAR; INTRAVENOUS at 07:39

## 2024-05-02 RX ADMIN — Medication 100 MCG: at 10:06

## 2024-05-02 RX ADMIN — Medication 200 MCG: at 10:09

## 2024-05-02 RX ADMIN — SUGAMMADEX 300 MG: 100 INJECTION, SOLUTION INTRAVENOUS at 10:19

## 2024-05-02 RX ADMIN — ESMOLOL HYDROCHLORIDE 10 MG: 100 INJECTION, SOLUTION INTRAVENOUS at 08:10

## 2024-05-02 RX ADMIN — Medication 200 MCG: at 09:08

## 2024-05-02 RX ADMIN — ROCURONIUM 40 MG: 100 INJECTION, SOLUTION INTRAVENOUS at 07:16

## 2024-05-02 RX ADMIN — Medication 300 MCG: at 08:25

## 2024-05-02 RX ADMIN — ESMOLOL HYDROCHLORIDE 10 MG: 100 INJECTION, SOLUTION INTRAVENOUS at 10:05

## 2024-05-02 RX ADMIN — Medication 100 MCG: at 07:54

## 2024-05-02 RX ADMIN — ROCURONIUM 10 MG: 100 INJECTION, SOLUTION INTRAVENOUS at 09:54

## 2024-05-02 RX ADMIN — EPHEDRINE SULFATE 10 MG: 50 INJECTION, SOLUTION INTRAVENOUS at 08:30

## 2024-05-02 RX ADMIN — Medication 6 L/MIN: at 10:28

## 2024-05-02 RX ADMIN — ROCURONIUM 5 MG: 100 INJECTION, SOLUTION INTRAVENOUS at 09:09

## 2024-05-02 RX ADMIN — Medication 200 MCG: at 08:48

## 2024-05-02 RX ADMIN — ESMOLOL HYDROCHLORIDE 10 MG: 100 INJECTION, SOLUTION INTRAVENOUS at 09:30

## 2024-05-02 RX ADMIN — CEFAZOLIN 2 G: 1 INJECTION, POWDER, FOR SOLUTION INTRAMUSCULAR; INTRAVENOUS at 07:45

## 2024-05-02 RX ADMIN — ESMOLOL HYDROCHLORIDE 10 MG: 100 INJECTION, SOLUTION INTRAVENOUS at 09:45

## 2024-05-02 RX ADMIN — EPHEDRINE SULFATE 10 MG: 50 INJECTION, SOLUTION INTRAVENOUS at 09:08

## 2024-05-02 RX ADMIN — SUCCINYLCHOLINE CHLORIDE 100 MG: 20 INJECTION, SOLUTION INTRAMUSCULAR; INTRAVENOUS at 07:44

## 2024-05-02 RX ADMIN — DEXAMETHASONE SODIUM PHOSPHATE 4 MG: 4 INJECTION, SOLUTION INTRAMUSCULAR; INTRAVENOUS at 08:05

## 2024-05-02 RX ADMIN — Medication 200 MCG: at 08:38

## 2024-05-02 RX ADMIN — ESMOLOL HYDROCHLORIDE 10 MG: 100 INJECTION, SOLUTION INTRAVENOUS at 09:56

## 2024-05-02 RX ADMIN — Medication 100 MCG: at 08:30

## 2024-05-02 SDOH — HEALTH STABILITY: MENTAL HEALTH: CURRENT SMOKER: 0

## 2024-05-02 ASSESSMENT — PAIN - FUNCTIONAL ASSESSMENT
PAIN_FUNCTIONAL_ASSESSMENT: 0-10
PAIN_FUNCTIONAL_ASSESSMENT: VAS (VISUAL ANALOG SCALE)
PAIN_FUNCTIONAL_ASSESSMENT: 0-10
PAIN_FUNCTIONAL_ASSESSMENT: 0-10
PAIN_FUNCTIONAL_ASSESSMENT: VAS (VISUAL ANALOG SCALE)

## 2024-05-02 ASSESSMENT — COLUMBIA-SUICIDE SEVERITY RATING SCALE - C-SSRS
2. HAVE YOU ACTUALLY HAD ANY THOUGHTS OF KILLING YOURSELF?: NO
1. IN THE PAST MONTH, HAVE YOU WISHED YOU WERE DEAD OR WISHED YOU COULD GO TO SLEEP AND NOT WAKE UP?: NO
6. HAVE YOU EVER DONE ANYTHING, STARTED TO DO ANYTHING, OR PREPARED TO DO ANYTHING TO END YOUR LIFE?: NO

## 2024-05-02 ASSESSMENT — PAIN SCALES - GENERAL
PAINLEVEL_OUTOF10: 0 - NO PAIN
PAINLEVEL_OUTOF10: 1
PAINLEVEL_OUTOF10: 0 - NO PAIN
PAINLEVEL_OUTOF10: 1
PAINLEVEL_OUTOF10: 1
PAINLEVEL_OUTOF10: 0 - NO PAIN
PAINLEVEL_OUTOF10: 0 - NO PAIN

## 2024-05-02 NOTE — OP NOTE
Robotic Right Inguinal Hernia Repair; Mesh Placement (R) Operative Note     Date: 2024  OR Location: [facility] A OR    Name: Monica Trotter, : 1950, Age: 73 y.o., MRN: [id number], Sex: female    Diagnosis  Pre-op Diagnosis     * Right Inguinal Hernia  Post-op Diagnosis      * Right Inguinal Hernia      Procedures  Robotic Right Inguinal Hernia Repair; Mesh Placement  [id number] - IA LAPAROSCOPY SURG RPR INITIAL INGUINAL HERNIA    Robotic Right Inguinal Hernia Repair; Mesh Placement   - IA ROBOTIC SURGICAL SYSTEM      Surgeons      * Philip Junior - Primary    Resident/Fellow/Other Assistant:  Surgeons and Role:  * No surgeons found with a matching role *    Procedure Summary  Anesthesia: General  ASA: III  Anesthesia Staff: Anesthesiologist: Abrahan Chen MD  CRNA: JAMISON Castillo-CRNA  Estimated Blood Loss: 5 mL  Intra-op Medications:   Administrations occurring from 0730 to 1000 on 24:   Medication Name Total Dose   lactated Ringer's infusion Cannot be calculated              Anesthesia Record               Intraprocedure I/O Totals          Intake    lactated Ringer's infusion 2000.00 mL    Total Intake 2000 mL          Specimen: No specimens collected     Staff:   Circulator: Loren Rea RN  Relief Circulator: Evangelist Hill RN  Relief Scrub: Clemencia Carroll  Scrub Person: Mrac Bose         Drains and/or Catheters:   [REMOVED] Urethral Catheter 16 Fr. (Removed)       Findings: large direct right inguinal hernia incarcerated with loop of small intestine.    Indications: Monica Trotter is an 73 y.o. female who is having surgery for Unilateral inguinal hernia without obstruction or gangrene, recurrent [K40.91].     The patient was seen in the preoperative area. The risks, benefits, complications, treatment options, non-operative alternatives, expected recovery and outcomes were discussed with the patient. The possibilities of reaction to medication, pulmonary aspiration, injury to  surrounding structures, bleeding, recurrent infection, the need for additional procedures, failure to diagnose a condition, and creating a complication requiring transfusion or operation were discussed with the patient. The patient concurred with the proposed plan, giving informed consent.  The site of surgery was properly noted/marked if necessary per policy. The patient has been actively warmed in preoperative area. Preoperative antibiotics have been ordered and given within 1 hours of incision. Venous thrombosis prophylaxis have been ordered including bilateral sequential compression devices     Procedure Details: CLINICAL NOTE:     Ms. Monica Trotter is a 73 y.o. year old female with who comes in for elective repair of a large, symptomatic right inguinal hernia.   Risk benefits alternatives were discussed preoperatively and she agrees to the operation.      DESCRIPTION OF PROCEDURE:    The patient was taken to the operating room, placed supine on the operating table.  Time-out was established.  General anesthesia was induced.  He was given antibiotics.  A Pack catheter was placed that was later removed after the procedure.  The abdomen was prepped and draped in a sterile fashion.  We made a supraumbilical midline incision, placed a 8 mm Radhames trocar.  We established insufflation. We placed 8 mm ports in the left and right upper abdominal wall respectively.  The patient had been placed in the Trendelenburg position.  She was noted to have several loops of small bowel adherent to her lower abdominal wall.  Some of these adhesions were taken down laparoscopically to facilitate exposure in the right lower abdomen.  We then docked the robot to our ports.   I scrubbed out and performed the operation from a console in the corner of the room.      He was noted to have a large direct right inguinal hernia incarcerated with loop of small intestine. The small bowel was reduced without difficulty.      We scored the  peritoneum just below the right inguinal ligament and very carefully took the peritoneum down from the right lower abdominal wall.  With very careful dissection we reduced the hernia sac and divided the round ligament.   There was no obvious indirect component.  The entire myopectineal orifice was exposed.  We then introduced a  large right-sided 3DMax mesh through our camera port, positioned this over the hernia defect, and secured it into place using interrupted  3-0 Vicryl stitches.  Sutures were placed anteriorly, laterally and at coopers ligament.  Next we re-peritonealized our repair by sewing the peritoneum up over the mesh using a 3-0 absorbable V-Loc suture.     We surveyed the abdominal cavity to inspect for any small bowel injuries and here were none.   We then removed our ports and the incisions infiltrated with 1% marcaine 0.05% lidocaine solution.  Next we closed our midline fascial defect using 0 Vicryl suture placed in a figure of eight fashion.  Skin was reapproximated using 3-0 and 4-0 subcuticular Vicryl stitches followed by Dermabond glue.  The patient tolerated the procedure without difficulty and was returned to the recovery room in stable condition.      Complications:  None; patient tolerated the procedure well.    Disposition: PACU - hemodynamically stable.  Condition: stable         Attending Attestation: I was present and scrubbed for the entire procedure.    Philip Junior  Phone Number: [phone]

## 2024-05-02 NOTE — H&P
H patient is a very pleasant 73-year-old female who I last saw on 1/18/2024.  The bulge has been present for about 8 weeks, is right-sided.  She has had occasional discomfort which is exacerbated by physical exertion.     At initial consultation I recommended robotic right inguinal hernia repair with mesh which she was agreeable to which is currently booked for May due to robot time.  Comes in today 2 weeks later stating that her inguinal pain is worsened over the last 2 weeks.  She feels that the hernia is getting slightly larger.  She states it still reduces easily but it has been more of a nuisance to her as of late.  She states that traditionally her bowel movements are every 1 day now it is more closer to every 2 or 3 days.  Of note she is still mourning the loss of her .     Patient denies any weight loss, fever, melena, hematochezia, jaundice. Denies any history of heart disease or cerebrovascular disease.     Patient does not smoke. Patient does very rarely drink alcohol.  She is retired      Medical History        Past Medical History:   Diagnosis Date    Body mass index (BMI) 31.0-31.9, adult 12/07/2021     BMI 31.0-31.9,adult    Body mass index (BMI) 32.0-32.9, adult 07/12/2021     Body mass index (BMI) of 32.0 to 32.9 in adult    Body mass index (BMI)30.0-30.9, adult 05/03/2022     Body mass index (BMI) of 30.0 to 30.9 in adult    Body mass index (BMI)30.0-30.9, adult 09/10/2020     BMI 30.0-30.9,adult    Encounter for immunization 09/28/2018     Need for prophylactic vaccination and inoculation against influenza    Personal history of other drug therapy 03/11/2021     History of pneumococcal vaccination    Vitreous degeneration, unspecified eye       Vitreous degeneration            Surgical History         Past Surgical History:   Procedure Laterality Date    COLONOSCOPY   03/18/2013     Complete Colonoscopy       PSH: Hysterectomy, laparoscopic cholecystectomy, shoulder surgery             Objective   Physical Exam:  General: Well developed patient, alert and oriented, NAD  Neuro: A&Ox3, grossly normal  CV: RRR, nrl S1, S2, no murmur, rubs, or clicks  Resp: Good bilateral air entry. No crackles,  wheezing, or rhonchi  Abdomen: Non distended, + BS, soft, non-tender  Small to moderate sized right inguinal hernia. Reduces easily.  No hernia noted on the left side            Assessment/Plan    Inpression:  symptomatic righ tinguinal hernia.  She come in for robotic righ tinguinal hernia with mesh.  We discussed the procedure to include risks, benefits and alternatives.  Patient agrees to the operation    Philip Junior MD

## 2024-05-02 NOTE — ANESTHESIA PREPROCEDURE EVALUATION
Patient: Monica Trotter    Procedure Information       Date/Time: 05/02/24 0730    Procedure: Robotic Right Inguinal Hernia Repair; Mesh Placement (Right)    Location: [facility] A OR 08 / [facility] A OR    Surgeons: Philip Junior MD            Relevant Problems   Anesthesia (within normal limits)      Cardiac   (+) Abnormal electrocardiography   (+) Benign essential hypertension   (+) Hyperlipidemia   (+) Low-density-lipoid-type (LDL) hyperlipoproteinemia   (+) Right bundle branch block (RBBB)      Neuro   (+) Cervical radiculopathy   (+) Depression, major, single episode, moderate (Multi)   (+) Reactive depression   (+) Sciatica of left side   (+) Situational anxiety      Endocrine   (+) DM retinopathy (Multi)   (+) Diabetes mellitus, type 2 (Multi) (On Ozempic. 4 days off. No GI sx)   (+) Diabetic neuropathy (Multi)      Hematology   (+) Anemia   (+) Vitamin B12 deficiency anemia      Musculoskeletal   (+) Lumbar degenerative disc disease   (+) Osteoarthrosis   (+) Primary osteoarthritis of both knees      ID   (+) Mycotic toenails      Skin   (+) Skin rash       Clinical information reviewed:   Tobacco  Allergies  Meds   Med Hx  Surg Hx  OB Status  Fam Hx  Soc   Hx        NPO Detail:  NPO/Void Status  Carbohydrate Drink Given Prior to Surgery? : N  Date of Last Liquid: 05/02/24  Time of Last Liquid: 0000  Date of Last Solid: 05/01/24  Time of Last Solid: 2330  Last Intake Type: Clear fluids         Physical Exam    Airway  Mallampati: II  TM distance: >3 FB  Neck ROM: full     Cardiovascular    Dental    Pulmonary    Abdominal            Anesthesia Plan    History of general anesthesia?: yes  History of complications of general anesthesia?: no    ASA 3     general   (Will proceed with RSI due to recent Ozempic. No GI sx whatsoever from Ozempic.)  The patient is not a current smoker.    intravenous induction   Anesthetic plan and risks discussed with patient.    Plan discussed with CRNA.

## 2024-05-02 NOTE — ANESTHESIA PROCEDURE NOTES
Airway  Date/Time: 5/2/2024 7:45 AM  Urgency: elective    Airway not difficult    Staffing  Performed: CRNA   Authorized by: Abrahan Chen MD    Performed by: JAMISON Castillo-CRNA  Patient location during procedure: OR    Indications and Patient Condition  Indications for airway management: anesthesia  Spontaneous Ventilation: absent  Sedation level: deep  Preoxygenated: yes  Patient position: sniffing  MILS maintained throughout  Mask difficulty assessment: 1 - vent by mask  No planned trial extubation    Final Airway Details  Final airway type: endotracheal airway      Successful airway: ETT  Cuffed: yes   Successful intubation technique: direct laryngoscopy  Facilitating devices/methods: cricoid pressure  Endotracheal tube insertion site: oral  Blade size: #3  ETT size (mm): 6.5  Cormack-Lehane Classification: grade I - full view of glottis  Placement verified by: chest auscultation and capnometry   Measured from: lips  ETT to lips (cm): 22  Number of attempts at approach: 1  Number of other approaches attempted: 0    Additional Comments  RSI due to Ozempic

## 2024-05-02 NOTE — ANESTHESIA POSTPROCEDURE EVALUATION
Patient: Monica Trotter    Procedure Summary       Date: 05/02/24 Room / Location: [facility] OR 08 / [facility]    Anesthesia Start: 0701 Anesthesia Stop: 1034    Procedure: Robotic Right Inguinal Hernia Repair; Mesh Placement (Right) Diagnosis:       Unilateral inguinal hernia without obstruction or gangrene, recurrent      (Unilateral inguinal hernia without obstruction or gangrene, recurrent [K40.91])    Surgeons: Philip Junior MD Responsible Provider: Abrahan Chen MD    Anesthesia Type: general ASA Status: 3            Anesthesia Type: general    Vitals Value Taken Time   /53 05/02/24 1116   Temp 36.2 °C (97.2 °F) 05/02/24 1028   Pulse 97 05/02/24 1129   Resp 16 05/02/24 1115   SpO2 94 % 05/02/24 1129   Vitals shown include unfiled device data.    Anesthesia Post Evaluation    Patient location during evaluation: PACU  Patient participation: complete - patient participated  Level of consciousness: awake  Pain management: adequate  Multimodal analgesia pain management approach  Airway patency: patent  Cardiovascular status: acceptable  Respiratory status: acceptable  Hydration status: acceptable  Postoperative Nausea and Vomiting: none  Comments: Will continue to assess PONV status.        No notable events documented.

## 2024-05-03 ASSESSMENT — PAIN SCALES - GENERAL: PAINLEVEL_OUTOF10: 5 - MODERATE PAIN

## 2024-05-06 ENCOUNTER — APPOINTMENT (OUTPATIENT)
Dept: PHYSICAL THERAPY | Facility: CLINIC | Age: 74
End: 2024-05-06
Payer: MEDICARE

## 2024-05-15 ENCOUNTER — OFFICE VISIT (OUTPATIENT)
Dept: CARDIOLOGY | Facility: HOSPITAL | Age: 74
End: 2024-05-15
Payer: MEDICARE

## 2024-05-15 VITALS
HEIGHT: 67 IN | WEIGHT: 143 LBS | HEART RATE: 89 BPM | SYSTOLIC BLOOD PRESSURE: 120 MMHG | DIASTOLIC BLOOD PRESSURE: 66 MMHG | BODY MASS INDEX: 22.44 KG/M2

## 2024-05-15 DIAGNOSIS — I10 BENIGN ESSENTIAL HYPERTENSION: Primary | ICD-10-CM

## 2024-05-15 DIAGNOSIS — I45.10 RIGHT BUNDLE BRANCH BLOCK (RBBB): ICD-10-CM

## 2024-05-15 DIAGNOSIS — E78.00 PURE HYPERCHOLESTEROLEMIA: ICD-10-CM

## 2024-05-15 LAB
ATRIAL RATE: 89 BPM
P AXIS: 77 DEGREES
P OFFSET: 216 MS
P ONSET: 160 MS
PR INTERVAL: 124 MS
Q ONSET: 222 MS
QRS COUNT: 15 BEATS
QRS DURATION: 120 MS
QT INTERVAL: 360 MS
QTC CALCULATION(BAZETT): 438 MS
QTC FREDERICIA: 410 MS
R AXIS: 75 DEGREES
T AXIS: 59 DEGREES
T OFFSET: 402 MS
VENTRICULAR RATE: 89 BPM

## 2024-05-15 PROCEDURE — 3078F DIAST BP <80 MM HG: CPT | Performed by: INTERNAL MEDICINE

## 2024-05-15 PROCEDURE — 99214 OFFICE O/P EST MOD 30 MIN: CPT | Performed by: INTERNAL MEDICINE

## 2024-05-15 PROCEDURE — 3061F NEG MICROALBUMINURIA REV: CPT | Performed by: INTERNAL MEDICINE

## 2024-05-15 PROCEDURE — 1036F TOBACCO NON-USER: CPT | Performed by: INTERNAL MEDICINE

## 2024-05-15 PROCEDURE — 3048F LDL-C <100 MG/DL: CPT | Performed by: INTERNAL MEDICINE

## 2024-05-15 PROCEDURE — 1159F MED LIST DOCD IN RCRD: CPT | Performed by: INTERNAL MEDICINE

## 2024-05-15 PROCEDURE — 3044F HG A1C LEVEL LT 7.0%: CPT | Performed by: INTERNAL MEDICINE

## 2024-05-15 PROCEDURE — 4010F ACE/ARB THERAPY RXD/TAKEN: CPT | Performed by: INTERNAL MEDICINE

## 2024-05-15 PROCEDURE — 1160F RVW MEDS BY RX/DR IN RCRD: CPT | Performed by: INTERNAL MEDICINE

## 2024-05-15 PROCEDURE — 3074F SYST BP LT 130 MM HG: CPT | Performed by: INTERNAL MEDICINE

## 2024-05-15 PROCEDURE — 93005 ELECTROCARDIOGRAM TRACING: CPT | Performed by: INTERNAL MEDICINE

## 2024-05-15 ASSESSMENT — ENCOUNTER SYMPTOMS
DEPRESSION: 0
LOSS OF SENSATION IN FEET: 0
OCCASIONAL FEELINGS OF UNSTEADINESS: 0

## 2024-05-15 NOTE — PROGRESS NOTES
Primary Care Physician: Libra Ramos MD  Date of Visit: 05/15/2024 11:40 AM EDT  Location of visit: [facility]     Chief Complaint:   Chief Complaint   Patient presents with    Follow-up        HPI / Summary:   Monica Trotter is a 73 y.o. female presents for followup.  She has no cardiac complaints.  She exercises 5 days a week either walking or swimming without chest pain or shortness of breath.  The patient denies chest pain, shortness of breath, palpitations, lightheadedness, syncope, orthopnea, paroxysmal nocturnal dyspnea, lower extremity edema, or bleeding problems.    She has intentionally lost more than 50 pounds.      Past Medical History:   Diagnosis Date    Arthritis     Body mass index (BMI) 31.0-31.9, adult 12/07/2021    BMI 31.0-31.9,adult    Body mass index (BMI) 32.0-32.9, adult 07/12/2021    Body mass index (BMI) of 32.0 to 32.9 in adult    Body mass index (BMI)30.0-30.9, adult 05/03/2022    Body mass index (BMI) of 30.0 to 30.9 in adult    Body mass index (BMI)30.0-30.9, adult 09/10/2020    BMI 30.0-30.9,adult    Encounter for immunization 09/28/2018    Need for prophylactic vaccination and inoculation against influenza    HTN (hypertension)     Hyperlipidemia     Personal history of other drug therapy 03/11/2021    History of pneumococcal vaccination    Type 2 diabetes mellitus (Multi)     Vitreous degeneration, unspecified eye     Vitreous degeneration        Past Surgical History:   Procedure Laterality Date    CHOLECYSTECTOMY      COLONOSCOPY  03/18/2013    Complete Colonoscopy    HYSTERECTOMY            Social History:   reports that she has never smoked. She has never used smokeless tobacco. She reports that she does not currently use alcohol. She reports that she does not currently use drugs.     Family History:  family history is not on file.      Allergies:  Allergies   Allergen Reactions    Sulfa (Sulfonamide Antibiotics) Unknown and Rash       Outpatient  "Medications:  Current Outpatient Medications   Medication Instructions    acetaminophen (TYLENOL 8 HOUR) 650 mg, oral, Every 8 hours PRN, Do not crush, chew, or split.    amitriptyline (ELAVIL) 25 mg, oral, Nightly    chlorthalidone (HYGROTON) 25 mg, oral, Daily    cholecalciferol (VITAMIN D3) 5,000 Units, oral, Daily    cyclobenzaprine (FLEXERIL) 5 mg, oral, Nightly PRN    flaxseed oiL oil 1,500 Units, miscellaneous, Daily    lisinopril 10 mg, oral, Daily    metFORMIN  mg 24 hr tablet TAKE 2 TABLETS TWICE A DAY    Ozempic 1 mg, subcutaneous, Once Weekly    prenatal no122/iron/folic acid (PRENATAL MULTI ORAL) oral    rosuvastatin (CRESTOR) 40 mg, oral, Daily       Physical Exam:  Vitals:    05/15/24 1153   BP: 120/66   BP Location: Left arm   Patient Position: Sitting   BP Cuff Size: Adult   Pulse: 89   Weight: 64.9 kg (143 lb)   Height: 1.702 m (5' 7\")     Wt Readings from Last 5 Encounters:   05/15/24 64.9 kg (143 lb)   05/02/24 68.1 kg (150 lb 2.1 oz)   03/27/24 68.9 kg (152 lb)   02/07/24 68.9 kg (152 lb)   02/02/24 69 kg (152 lb 3.2 oz)     Body mass index is 22.4 kg/m².   General: Well-developed well-nourished in no acute distress  HEENT: Normocephalic atraumatic  Neck: Supple, JVP is normal negative hepatojugular reflux 2+ carotid pulses without bruit  Pulmonary: Normal respiratory effort, clear to auscultation  Cardiovascular: No right ventricular heave, normal S1 and S2, no murmurs rubs or gallops  Abdomen: Soft nontender nondistended  Extremities: Warm without edema 2+ radial pulses bilaterally 2+ dorsalis pedis pulses bilaterally  Neurologic: Alert and oriented x3  Psychiatric: Normal mood and affect     Last Labs:  CMP:  Recent Labs     02/07/24  1054 08/29/23  0938 12/29/22  1127    140 137   K 4.3 3.9 4.1   CL 99 102 101   CO2 27 28 26   ANIONGAP 15 14 14   BUN 19 14 23   CREATININE 1.23* 1.12* 1.45*   EGFR 46*  --   --    GLUCOSE 115* 121* 220*     Recent Labs     02/07/24  1054 " 08/29/23  0938 12/29/22  1127   ALBUMIN 4.1 4.3 4.3   ALKPHOS 45 68 74   ALT 9 23 17   AST 14 21 17   BILITOT 0.4 0.4 0.4     CBC:  Recent Labs     02/07/24  1054 12/29/22  1127 08/11/22  1244   WBC 4.2* 4.8 4.5   HGB 10.1* 11.3* 10.9*   HCT 31.6* 35.6* 32.5*    306 251   MCV 88 89 87     COAG:   Recent Labs     02/01/22  1936   INR 1.0     HEME/ENDO:  Recent Labs     02/07/24  1054 08/29/23  0938 12/29/22  1127 09/20/22  1514 05/06/22  1236 02/08/22  1417   FERRITIN  --   --   --   --   --  13   IRONSAT  --   --   --   --   --  NOT CALC.   TSH 1.63 2.18  --   --  1.67  --    HGBA1C 5.7* 6.1* 8.2*   < > 7.6*  --     < > = values in this interval not displayed.      CARDIAC:   Recent Labs     05/06/22  1236        Recent Labs     02/07/24  1054 04/29/23  0948 05/06/22  1236 02/02/21  1048   CHOL 114 114 148 181   LDLF  --  38 61 94   LDLCALC 42  --   --   --    HDL 55.8 53.6 54.5 51.7   TRIG 81 112 162* 175*       Last Cardiology Tests:  ECG:  An electrocardiogram performed today that I reviewed shows sinus rhythm with PACs right bundle branch block.    Echo:  Echocardiogram February 8, 2022  CONCLUSIONS:   1. The left ventricular systolic function is normal with a 60-65% estimated ejection fraction.   2. Spectral Doppler shows an impaired relaxation pattern of left ventricular diastolic filling.       Cath:      Stress Test:  Exercise nuclear stress test September 25, 2020  The patient then underwent treadmill stress exercising to  98 % of MPHR and achieving  7.8 METS.  IMPRESSION:  * Normal stress myocardial perfusion imaging without definite  evidence of ischemia or prior infarct.  *Well-maintained left ventricular function with an ejection fraction  of 58%. Ejection fraction is improved in comparison to prior study.  *Normal left ventricular size.         Cardiac Imaging:  CT chest PE study February 1, 2022  HEART AND VESSELS:  No acute pulmonary embolism to the  segmental arterial level.     Main  pulmonary artery and its branches are normal in caliber.     The thoracic aorta is of normal course and caliber  with scattered  vascular calcifications.     No coronary artery calcifications are seen.The study is not optimized  for evaluation of coronary arteries.     The cardiac chambers are not enlarged.     No evidence of pericardial effusion.      Assessment/Plan   Diagnoses and all orders for this visit:  Benign essential hypertension  -     ECG 12 lead (Clinic Performed)  Pure hypercholesterolemia  Right bundle branch block (RBBB)    In summary Ms. Trotter is a pleasant 73 year-old -American female with a past medical history significant for type II non-insulin-requiring diabetes, hypertension, hyperlipidemia, and obesity.  She is asymptomatic from a cardiac perspective.  Her blood pressure and lipids are at goal.  She should continue her current cardiovascular medications.  We will see her back in follow-up in 1 year.      Orders:  Orders Placed This Encounter   Procedures    ECG 12 lead (Clinic Performed)      Followup Appts:  Future Appointments   Date Time Provider Department Center   5/17/2024  2:30 PM Philip Junior MD [id number] [facility]   5/22/2024 11:40 AM Libra Ramos MD [id number] [facility]   6/17/2024  1:00 PM Kerry Dooley, PT [id number] [facility]   7/1/2024  1:00 PM Suzi George MD [id number] [facility]   7/11/2024  3:15 PM Consuelo John MD [id number] [facility]   2/7/2025  1:45 PM Renée Matta OD [id number] [facility]           ____________________________________________________________  Tad Horn MD  [facility]  [facility]

## 2024-05-17 ENCOUNTER — OFFICE VISIT (OUTPATIENT)
Dept: SURGERY | Facility: CLINIC | Age: 74
End: 2024-05-17
Payer: MEDICARE

## 2024-05-17 VITALS
DIASTOLIC BLOOD PRESSURE: 70 MMHG | HEIGHT: 67 IN | HEART RATE: 85 BPM | BODY MASS INDEX: 22.98 KG/M2 | TEMPERATURE: 97.3 F | SYSTOLIC BLOOD PRESSURE: 126 MMHG | WEIGHT: 146.4 LBS

## 2024-05-17 DIAGNOSIS — Z09 POSTOPERATIVE EXAMINATION: ICD-10-CM

## 2024-05-17 DIAGNOSIS — K40.91 UNILATERAL INGUINAL HERNIA WITHOUT OBSTRUCTION OR GANGRENE, RECURRENT: Primary | ICD-10-CM

## 2024-05-17 PROCEDURE — 1159F MED LIST DOCD IN RCRD: CPT | Performed by: SURGERY

## 2024-05-17 PROCEDURE — 1036F TOBACCO NON-USER: CPT | Performed by: SURGERY

## 2024-05-17 PROCEDURE — 3044F HG A1C LEVEL LT 7.0%: CPT | Performed by: SURGERY

## 2024-05-17 PROCEDURE — 1160F RVW MEDS BY RX/DR IN RCRD: CPT | Performed by: SURGERY

## 2024-05-17 PROCEDURE — 3074F SYST BP LT 130 MM HG: CPT | Performed by: SURGERY

## 2024-05-17 PROCEDURE — 3061F NEG MICROALBUMINURIA REV: CPT | Performed by: SURGERY

## 2024-05-17 PROCEDURE — 3048F LDL-C <100 MG/DL: CPT | Performed by: SURGERY

## 2024-05-17 PROCEDURE — 1125F AMNT PAIN NOTED PAIN PRSNT: CPT | Performed by: SURGERY

## 2024-05-17 PROCEDURE — 4010F ACE/ARB THERAPY RXD/TAKEN: CPT | Performed by: SURGERY

## 2024-05-17 PROCEDURE — 3078F DIAST BP <80 MM HG: CPT | Performed by: SURGERY

## 2024-05-17 PROCEDURE — 99024 POSTOP FOLLOW-UP VISIT: CPT | Performed by: SURGERY

## 2024-05-17 RX ORDER — TRAMADOL HYDROCHLORIDE 50 MG/1
50 TABLET ORAL EVERY 8 HOURS PRN
Qty: 10 TABLET | Refills: 0 | Status: SHIPPED | OUTPATIENT
Start: 2024-05-17 | End: 2024-05-22

## 2024-05-17 ASSESSMENT — ENCOUNTER SYMPTOMS: DEPRESSION: 0

## 2024-05-17 ASSESSMENT — PAIN SCALES - GENERAL: PAINLEVEL: 6

## 2024-05-17 NOTE — PROGRESS NOTES
Mrs. Monica Trotter is a 73 y.o. year old female who comes in today for follow-up after undergoing robotic right inguinal hernia repair with mesh.  This procedure was performed on 5/2/2024.    Patient is doing very well and is pleased with the outcome of the surgery.    Does have some tenderness around the umbilical incision    Tolerating a regular diet, bowel movements are normal    On exam patient looks well    Incisions are healing very nicely    Impression: Doing well following robotic right inguinal hernia repair    For some pain around the umbilicus recommend warm compress    Did prescribe a few tramadol..    Discussed increasing activity level    Follow-up with me as needed       Philip Junior MD 05/17/24 2:45 PM

## 2024-05-17 NOTE — LETTER
May 17, 2024     Libra Ramos MD  [address]  [facility] [address]  [facility] [address]    Patient: Monica Trotter   YOB: 1950   Date of Visit: 5/17/2024       Dear Dr. Libra Ramos MD:    Thank you for referring Monica Trotter to me for evaluation. Below are my notes for this consultation.  If you have questions, please do not hesitate to call me. I look forward to following your patient along with you.       Sincerely,     Philip Junior MD      CC: No Recipients  ______________________________________________________________________________________       Mrs. Monica Trtoter is a 73 y.o. year old female who comes in today for follow-up after undergoing robotic right inguinal hernia repair with mesh.  This procedure was performed on 5/2/2024.    Patient is doing very well and is pleased with the outcome of the surgery.    Does have some tenderness around the umbilical incision    Tolerating a regular diet, bowel movements are normal    On exam patient looks well    Incisions are healing very nicely    Impression: Doing well following robotic right inguinal hernia repair    For some pain around the umbilicus recommend warm compress    Did prescribe a few tramadol..    Discussed increasing activity level    Follow-up with me as needed       Philip Junior MD 05/17/24 2:45 PM

## 2024-05-22 ENCOUNTER — OFFICE VISIT (OUTPATIENT)
Dept: PRIMARY CARE | Facility: CLINIC | Age: 74
End: 2024-05-22
Payer: MEDICARE

## 2024-05-22 VITALS — DIASTOLIC BLOOD PRESSURE: 60 MMHG | SYSTOLIC BLOOD PRESSURE: 98 MMHG | WEIGHT: 144 LBS | BODY MASS INDEX: 22.55 KG/M2

## 2024-05-22 DIAGNOSIS — E78.00 PURE HYPERCHOLESTEROLEMIA: ICD-10-CM

## 2024-05-22 DIAGNOSIS — K40.91 UNILATERAL INGUINAL HERNIA WITHOUT OBSTRUCTION OR GANGRENE, RECURRENT: ICD-10-CM

## 2024-05-22 DIAGNOSIS — E11.9 TYPE 2 DIABETES MELLITUS WITHOUT COMPLICATION, WITHOUT LONG-TERM CURRENT USE OF INSULIN (MULTI): ICD-10-CM

## 2024-05-22 DIAGNOSIS — I10 BENIGN ESSENTIAL HYPERTENSION: Primary | ICD-10-CM

## 2024-05-22 LAB — POC HEMOGLOBIN A1C: 6 % (ref 4.2–6.5)

## 2024-05-22 PROCEDURE — 3048F LDL-C <100 MG/DL: CPT | Performed by: INTERNAL MEDICINE

## 2024-05-22 PROCEDURE — G2211 COMPLEX E/M VISIT ADD ON: HCPCS | Performed by: INTERNAL MEDICINE

## 2024-05-22 PROCEDURE — 99214 OFFICE O/P EST MOD 30 MIN: CPT | Performed by: INTERNAL MEDICINE

## 2024-05-22 PROCEDURE — 3078F DIAST BP <80 MM HG: CPT | Performed by: INTERNAL MEDICINE

## 2024-05-22 PROCEDURE — 4010F ACE/ARB THERAPY RXD/TAKEN: CPT | Performed by: INTERNAL MEDICINE

## 2024-05-22 PROCEDURE — 83036 HEMOGLOBIN GLYCOSYLATED A1C: CPT | Performed by: INTERNAL MEDICINE

## 2024-05-22 PROCEDURE — 3061F NEG MICROALBUMINURIA REV: CPT | Performed by: INTERNAL MEDICINE

## 2024-05-22 PROCEDURE — 3074F SYST BP LT 130 MM HG: CPT | Performed by: INTERNAL MEDICINE

## 2024-05-22 PROCEDURE — 1160F RVW MEDS BY RX/DR IN RCRD: CPT | Performed by: INTERNAL MEDICINE

## 2024-05-22 PROCEDURE — 1159F MED LIST DOCD IN RCRD: CPT | Performed by: INTERNAL MEDICINE

## 2024-05-22 PROCEDURE — 3044F HG A1C LEVEL LT 7.0%: CPT | Performed by: INTERNAL MEDICINE

## 2024-05-22 RX ORDER — SEMAGLUTIDE 1.34 MG/ML
1 INJECTION, SOLUTION SUBCUTANEOUS
Qty: 3 ML | Refills: 0 | Status: CANCELLED | OUTPATIENT
Start: 2024-05-26

## 2024-05-22 NOTE — PROGRESS NOTES
Subjective   Patient ID: Monica Trotter is a 73 y.o. female who presents for Follow-up.  Patient here for follow-up.  Patient is doing well overall.  Complains of pelvic pain after hernia repair on May 2 however is improving.  Mainly exertional pain with increasing activity.  Bowel movements are regular denies any nausea, vomiting, worsening pain.  No fever or chills.  Otherwise is doing well, no fever, chills, chest pain, shortness of breath, changes in urination.        Review of Systems   All other systems reviewed and are negative.      Objective   Physical Exam  Vitals reviewed.   Constitutional:       Appearance: Normal appearance.   Cardiovascular:      Rate and Rhythm: Normal rate and regular rhythm.      Pulses: Normal pulses.      Heart sounds: Normal heart sounds.   Pulmonary:      Effort: Pulmonary effort is normal.      Breath sounds: Normal breath sounds.   Abdominal:      General: Abdomen is flat. Bowel sounds are normal.      Palpations: Abdomen is soft.   Neurological:      Mental Status: She is alert.       BP 98/60   Wt 65.3 kg (144 lb)   BMI 22.55 kg/m²      Assessment/Plan   Problem List Items Addressed This Visit       Diabetes mellitus, type 2 (Multi)    Relevant Orders    POCT glycosylated hemoglobin (Hb A1C) manually resulted (Completed)        #DM  a1c 6.0, AUC wnl   continue metformin, ozempic 0.5  Eye exam UTD, please make appt with podiatry, referral     HTN, HLD   continue chlorthalidone, lisinopril, statin      #lumbar DJD: spinal stenosis and foramina narrowing   Stable   -continue current pain reg, PT      #Hernia sp repair  -still having some pain   -no n/v/d, tolerating diet, BM regular  -increase acitivity level as tolerated       labs UTD   cancer screen: mammo ordered please make appt, cscope due 2027  DEXA ordered, please make appt   Immunizations: UTD      Rtc in 4months

## 2024-06-17 ENCOUNTER — EVALUATION (OUTPATIENT)
Dept: PHYSICAL THERAPY | Facility: CLINIC | Age: 74
End: 2024-06-17
Payer: MEDICARE

## 2024-06-17 DIAGNOSIS — M54.50 LOW BACK PAIN: Primary | ICD-10-CM

## 2024-06-17 DIAGNOSIS — M25.561 CHRONIC PAIN OF BOTH KNEES: ICD-10-CM

## 2024-06-17 DIAGNOSIS — M25.562 CHRONIC PAIN OF BOTH KNEES: ICD-10-CM

## 2024-06-17 DIAGNOSIS — M25.552 HIP PAIN, ACUTE, LEFT: ICD-10-CM

## 2024-06-17 DIAGNOSIS — M85.88 OSTEOPENIA OF LUMBAR SPINE: ICD-10-CM

## 2024-06-17 DIAGNOSIS — G89.29 CHRONIC PAIN OF BOTH KNEES: ICD-10-CM

## 2024-06-17 PROCEDURE — 97161 PT EVAL LOW COMPLEX 20 MIN: CPT | Mod: GP

## 2024-06-17 PROCEDURE — 97110 THERAPEUTIC EXERCISES: CPT | Mod: GP

## 2024-06-17 ASSESSMENT — PAIN - FUNCTIONAL ASSESSMENT: PAIN_FUNCTIONAL_ASSESSMENT: 0-10

## 2024-06-17 ASSESSMENT — ENCOUNTER SYMPTOMS
DEPRESSION: 0
LOSS OF SENSATION IN FEET: 0
OCCASIONAL FEELINGS OF UNSTEADINESS: 0

## 2024-06-17 ASSESSMENT — PAIN SCALES - GENERAL: PAINLEVEL_OUTOF10: 2

## 2024-06-17 NOTE — PROGRESS NOTES
Physical Therapy    Physical Therapy Evaluation    Patient Name: Monica Trotter  MRN: [id number]  Today's Date: 6/17/2024  PT Evaluation Time Entry  PT Evaluation (Low) Time Entry: 40  PT Therapeutic Procedures Time Entry  Therapeutic Exercise Time Entry: 10                  Visit #1  Insurance; Medicare  Cert; 6/17/2024- 9/13/2024  Assessment ;  Patient presents with chronic back pain, which she believes was exacerbated by water aerobics classes. Patient believes that she feels better since she too a break from the class. She does feel stiff and often finds herself bending forward once on feet or ambulating for extended period of time. Patient has good posture awareness and sounds optimistic to learn there x in order to control, now minimal back pain. Patient hopes to resume work outs in the water and admits to being stiff most of the time, hoping to learn proper exercises which may help. No need to schedule follow ups at this point, as patient demo full understanding of simple repeated movement ROM regiment for lumbar spine     Plan  Treatment/Interventions: Education/ Instruction, Manual therapy, Therapeutic exercises, Therapeutic activities  PT Plan: Skilled PT  Certification Period Start Date: 06/17/24  Certification Period End Date: 09/13/24  Rehab Potential: Good  Plan of Care Agreement: Patient    Current Problem  1. Hip pain, acute, left  Referral to Physical Therapy      2. Chronic pain of both knees  Referral to Physical Therapy      3. Osteopenia of lumbar spine  Referral to Physical Therapy          Subjective   General:  General  Reason for Referral: PT eval and treat;  Referred By: Libra Arzola  Past Medical History Relevant to Rehab: Hx of lower back, left hip and knees pain (Much better after I stopped stretching in gym. want to learn how to exercises in water and not hurt myself)  General Comment: I havebeen in PT many years ago, and do not recall exercises to keep my self strong and away  from pain.  Precautions: NA  Precautions  STEADI Fall Risk Score (The score of 4 or more indicates an increased risk of falling): 1  Vital Signs: NA     Pain:  Pain Assessment: 0-10  Pain Score: 2  Pain Type: Acute pain  Pain Location:  (No knee, back or hip pain today. Generally feeling better lately)  Effect of Pain on Daily Activities: nothing lately because I stop any twisting or turning exercises motions. Recently had hearnia repair in early May, and just resuming gym workouts  Patient's Stated Pain Goal:  (To learn how to exercises properly without causing pain to myself)  Pain Interventions:  (Tylenol as needed, and rest in lying position)  Home Living: NA     Prior Function Per Pt/Caregiver Report: independent without assistance of devices        Objective   Posture: WNL     Range of Motion: Moderate loss of lumbar flexion,                                  Major loss of lumbar extension                                 Moderate loss of lateral flexion/rotation  WNL hips and knees ROM       Strength: bilateral hip flexors 4/5                   Bilateral Quads and Gluteals 5/5     Flexibility: lumbar extensors tight      Palpation: WNL     Special Tests: NA  All lumbar spine directions repeated movement testing without major of change to initial soreness and aches during ROM tests     Gait: WNL      Transfers: slow  Arm assist preferred  30 seconds sit/stand = 4    Outcome Measures:  LEFS = 72/80   Modified Oswestry = 6%    OP EDUCATION:  Outpatient Education  Individual(s) Educated: Patient  Education Provided: POC, Home Safety, Home Exercise Program, Body Mechanics  Risk and Benefits Discussed with Patient/Caregiver/Other: yes  Patient/Caregiver Demonstrated Understanding: yes  Plan of Care Discussed and Agreed Upon: yes  Patient Response to Education: Patient/Caregiver Verbalized Understanding of Information    PT intervention;  Access Code: [email or web address]  URL: [email or web address]  Date:  06/17/2024  Prepared by: Kerry Avinac    Exercises  - Supine Lower Trunk Rotation  - 1 x daily - 7 x weekly - 3 sets - 10 reps  - Hooklying Single Knee to Chest Stretch  - 1 x daily - 7 x weekly - 3 sets - 10 reps  - Supine Double Knee to Chest  - 1 x daily - 7 x weekly - 3 sets - 10 reps  - Supine Bridge  - 1 x daily - 7 x weekly - 3 sets - 10 reps  - Seated Flexion Stretch  - 1 x daily - 7 x weekly - 3 sets - 10 reps  - Sit to Stand with Hands on Knees  - 1 x daily - 7 x weekly - 3 sets - 10 reps  Goals:  Active       PT Problem       PT Goal 1       Start:  06/17/24    Expected End:  09/13/24       Patient will report reduced/abolished pain in lower back , indicated by grade of 0-2 on 0-10 pain scale          PT Goal 2       Start:  06/17/24    Expected End:  09/13/24       Patient will demonstrated knowledge of HEP, its maintenance frequency, and associated benefits

## 2024-06-30 ASSESSMENT — SLIT LAMP EXAM - LIDS
COMMENTS: GOOD POSITION
COMMENTS: GOOD POSITION

## 2024-06-30 ASSESSMENT — EXTERNAL EXAM - LEFT EYE: OS_EXAM: NORMAL

## 2024-06-30 ASSESSMENT — EXTERNAL EXAM - RIGHT EYE: OD_EXAM: NORMAL

## 2024-06-30 NOTE — PROGRESS NOTES
Combined form of age-related cataract, right eyeH25.811  Combined form of age-related cataract, left eyeH25.812  -Mildly visually significant cataracts. Symptoms: Gradual decrease in vision over the past year. Harder to read small print. More difficulty seeing small words/numbers on TV. Having more trouble seeing road signs when driving. Glare from headlights when driving at night.   -At this time vision does not meet criteria for cataract surgery. Patient has appt with Dr. Matta 2/2025, may return to see me for CEIOL evaluation if vision worsens/becomes more symptomatic.   **Referred by Dr. Matta    Glaucoma suspect, bilateral  -Follows with Dr. Matta, gets yearly OCT RNFL. Has been stable. Referred to Dr. John, has appt on 7/11/24 (missed prior appt in 3/2024).    Diabetes  -HbA1c= 6.0 (5/22/24 POCT). No diabetic retinopathy seen on exam. Continue close monitoring of blood glucose, blood pressure, and cholesterol. Plan for annual dilated eye exam.    Exotropia, both eyes  -Dr. Matta discussed prisms vs vision therapy.    Myopia  Astigmatism  Presbyopia  -Continue current glasses.       No history of intraocular surgery/refractive surgery.   No FH of AMD/glaucoma      Attending Attestation Statement for Evaluation and Management Services:    I was physically present and/or personally examined the patient during the evaluation of this patient. I reviewed the documentation and confirm the resident's note.

## 2024-07-01 ENCOUNTER — APPOINTMENT (OUTPATIENT)
Dept: OPHTHALMOLOGY | Facility: CLINIC | Age: 74
End: 2024-07-01
Payer: MEDICARE

## 2024-07-01 DIAGNOSIS — H50.10 EXOTROPIA, BOTH EYES: ICD-10-CM

## 2024-07-01 DIAGNOSIS — H52.203 ASTIGMATISM OF BOTH EYES, UNSPECIFIED TYPE: ICD-10-CM

## 2024-07-01 DIAGNOSIS — E11.9 DIABETES MELLITUS WITHOUT COMPLICATION (MULTI): ICD-10-CM

## 2024-07-01 DIAGNOSIS — H25.811 COMBINED FORM OF AGE-RELATED CATARACT, RIGHT EYE: Primary | ICD-10-CM

## 2024-07-01 DIAGNOSIS — H52.11 MYOPIA OF RIGHT EYE: ICD-10-CM

## 2024-07-01 DIAGNOSIS — H40.003 GLAUCOMA SUSPECT OF BOTH EYES: ICD-10-CM

## 2024-07-01 DIAGNOSIS — H52.4 PRESBYOPIA: ICD-10-CM

## 2024-07-01 DIAGNOSIS — H25.812 COMBINED FORM OF AGE-RELATED CATARACT, LEFT EYE: ICD-10-CM

## 2024-07-01 PROCEDURE — 92014 COMPRE OPH EXAM EST PT 1/>: CPT | Performed by: OPHTHALMOLOGY

## 2024-07-01 ASSESSMENT — REFRACTION_WEARINGRX
OD_AXIS: 100
OD_ADD: +2.75
OD_CYLINDER: -1.25
OS_CYLINDER: -1.50
OS_SPHERE: -0.75
OS_ADD: +2.75
OD_SPHERE: -2.25
OS_AXIS: 100

## 2024-07-01 ASSESSMENT — ENCOUNTER SYMPTOMS
MUSCULOSKELETAL NEGATIVE: 0
ALLERGIC/IMMUNOLOGIC NEGATIVE: 0
PSYCHIATRIC NEGATIVE: 0
CARDIOVASCULAR NEGATIVE: 0
GASTROINTESTINAL NEGATIVE: 0
RESPIRATORY NEGATIVE: 0
EYES NEGATIVE: 1
ENDOCRINE NEGATIVE: 0
NEUROLOGICAL NEGATIVE: 0
CONSTITUTIONAL NEGATIVE: 0
HEMATOLOGIC/LYMPHATIC NEGATIVE: 0

## 2024-07-01 ASSESSMENT — CUP TO DISC RATIO
OS_RATIO: 0.65
OD_RATIO: 0.65

## 2024-07-01 ASSESSMENT — TONOMETRY
IOP_METHOD: GOLDMANN APPLANATION
OD_IOP_MMHG: 17
OS_IOP_MMHG: 17

## 2024-07-01 ASSESSMENT — REFRACTION_MANIFEST
OS_AXIS: 100
OD_AXIS: 100
OD_CYLINDER: -1.25
OS_CYLINDER: -1.50
OD_ADD: +2.75
OS_ADD: +2.75
OS_SPHERE: -0.75
OD_SPHERE: -2.25

## 2024-07-01 ASSESSMENT — VISUAL ACUITY
OD_BAT_MED: 20/30
OS_BAT_MED: 20/30
OD_CC: 20/30
METHOD: SNELLEN - LINEAR
CORRECTION_TYPE: GLASSES
OS_CC: 20/30

## 2024-07-01 ASSESSMENT — PACHYMETRY
OS_CT(UM): 615
OD_CT(UM): 598

## 2024-07-11 ENCOUNTER — APPOINTMENT (OUTPATIENT)
Dept: OPHTHALMOLOGY | Facility: CLINIC | Age: 74
End: 2024-07-11
Payer: MEDICARE

## 2024-07-11 DIAGNOSIS — H40.003 GLAUCOMA SUSPECT OF BOTH EYES: Primary | ICD-10-CM

## 2024-07-11 DIAGNOSIS — H25.13 AGE-RELATED NUCLEAR CATARACT OF BOTH EYES: ICD-10-CM

## 2024-07-11 PROCEDURE — 92083 EXTENDED VISUAL FIELD XM: CPT | Performed by: OPHTHALMOLOGY

## 2024-07-11 PROCEDURE — 99214 OFFICE O/P EST MOD 30 MIN: CPT | Performed by: OPHTHALMOLOGY

## 2024-07-11 ASSESSMENT — TONOMETRY
OS_IOP_MMHG: 21
IOP_METHOD: GOLDMANN APPLANATION
OD_IOP_MMHG: 16

## 2024-07-11 ASSESSMENT — CONF VISUAL FIELD
OS_INFERIOR_NASAL_RESTRICTION: 0
OD_INFERIOR_TEMPORAL_RESTRICTION: 0
OD_SUPERIOR_TEMPORAL_RESTRICTION: 0
OS_SUPERIOR_NASAL_RESTRICTION: 0
OS_SUPERIOR_TEMPORAL_RESTRICTION: 0
OD_NORMAL: 1
OS_NORMAL: 1
OD_SUPERIOR_NASAL_RESTRICTION: 0
OD_INFERIOR_NASAL_RESTRICTION: 0
OS_INFERIOR_TEMPORAL_RESTRICTION: 0

## 2024-07-11 ASSESSMENT — ENCOUNTER SYMPTOMS
CARDIOVASCULAR NEGATIVE: 0
GASTROINTESTINAL NEGATIVE: 0
ENDOCRINE NEGATIVE: 0
EYES NEGATIVE: 0
NEUROLOGICAL NEGATIVE: 0
MUSCULOSKELETAL NEGATIVE: 0
ALLERGIC/IMMUNOLOGIC NEGATIVE: 0
PSYCHIATRIC NEGATIVE: 0
RESPIRATORY NEGATIVE: 0
HEMATOLOGIC/LYMPHATIC NEGATIVE: 0
CONSTITUTIONAL NEGATIVE: 0

## 2024-07-11 ASSESSMENT — VISUAL ACUITY
OS_CC+: +2
OD_CC+: +1
CORRECTION_TYPE: GLASSES
OD_BAT_MED: 20/40
METHOD: SNELLEN - LINEAR
OS_BAT_MED: 20/40
OS_CC: 20/30
OD_CC: 20/40

## 2024-07-11 ASSESSMENT — EXTERNAL EXAM - LEFT EYE: OS_EXAM: NORMAL

## 2024-07-11 ASSESSMENT — SLIT LAMP EXAM - LIDS
COMMENTS: GOOD POSITION
COMMENTS: GOOD POSITION

## 2024-07-11 ASSESSMENT — PACHYMETRY
OS_CT(UM): 615
OD_CT(UM): 598

## 2024-07-11 ASSESSMENT — EXTERNAL EXAM - RIGHT EYE: OD_EXAM: NORMAL

## 2024-07-11 ASSESSMENT — CUP TO DISC RATIO
OS_RATIO: 0.65
OD_RATIO: 0.60

## 2024-07-11 NOTE — PROGRESS NOTES
1. Glaucoma suspect based on increased CDR   Tmax 21/22, /615, FH + (father)   No ocular trauma or prolonged steroids or cbd oil   Gonio: open 360 OU  Briceño Visual Field - OU - Both Eyes          Normal OU without evidence of glacuoma           OCT RNFL (3/23/23): full OU, stable to 2021  Patient remains low risk   monitor off gtt   f/u 12 months for HVF 24-2, bat, dfe, oct rnfl, lens star      2. cataracts: not visually significant, MONITOR      3. diabetes with retinopathy:. no associated DME on oct mac. Monitor. Continue medical manegement of blind spot (BS)/BP

## 2024-07-30 DIAGNOSIS — E11.9 TYPE 2 DIABETES MELLITUS WITHOUT COMPLICATION, WITHOUT LONG-TERM CURRENT USE OF INSULIN (MULTI): Primary | ICD-10-CM

## 2024-07-30 RX ORDER — BLOOD-GLUCOSE METER
KIT MISCELLANEOUS
Qty: 100 STRIP | Refills: 0 | Status: SHIPPED | OUTPATIENT
Start: 2024-07-30

## 2024-08-22 DIAGNOSIS — M54.12 CERVICAL RADICULOPATHY: ICD-10-CM

## 2024-08-22 DIAGNOSIS — I10 BENIGN ESSENTIAL HYPERTENSION: ICD-10-CM

## 2024-08-22 DIAGNOSIS — E78.5 HYPERLIPIDEMIA, UNSPECIFIED HYPERLIPIDEMIA TYPE: ICD-10-CM

## 2024-08-22 RX ORDER — AMITRIPTYLINE HYDROCHLORIDE 25 MG/1
25 TABLET, FILM COATED ORAL NIGHTLY
Qty: 90 TABLET | Refills: 0 | Status: SHIPPED | OUTPATIENT
Start: 2024-08-22

## 2024-08-22 RX ORDER — ROSUVASTATIN CALCIUM 40 MG/1
40 TABLET, COATED ORAL DAILY
Qty: 90 TABLET | Refills: 0 | Status: SHIPPED | OUTPATIENT
Start: 2024-08-22

## 2024-09-16 ENCOUNTER — OFFICE VISIT (OUTPATIENT)
Dept: PRIMARY CARE | Facility: CLINIC | Age: 74
End: 2024-09-16
Payer: MEDICARE

## 2024-09-16 VITALS
SYSTOLIC BLOOD PRESSURE: 119 MMHG | BODY MASS INDEX: 23.39 KG/M2 | DIASTOLIC BLOOD PRESSURE: 72 MMHG | WEIGHT: 149 LBS | HEIGHT: 67 IN | HEART RATE: 91 BPM

## 2024-09-16 DIAGNOSIS — M54.42 ACUTE LEFT-SIDED LOW BACK PAIN WITH LEFT-SIDED SCIATICA: Primary | ICD-10-CM

## 2024-09-16 PROCEDURE — 99214 OFFICE O/P EST MOD 30 MIN: CPT | Performed by: INTERNAL MEDICINE

## 2024-09-16 PROCEDURE — 4010F ACE/ARB THERAPY RXD/TAKEN: CPT | Performed by: INTERNAL MEDICINE

## 2024-09-16 PROCEDURE — 3074F SYST BP LT 130 MM HG: CPT | Performed by: INTERNAL MEDICINE

## 2024-09-16 PROCEDURE — 96372 THER/PROPH/DIAG INJ SC/IM: CPT | Performed by: INTERNAL MEDICINE

## 2024-09-16 PROCEDURE — 1036F TOBACCO NON-USER: CPT | Performed by: INTERNAL MEDICINE

## 2024-09-16 PROCEDURE — 3044F HG A1C LEVEL LT 7.0%: CPT | Performed by: INTERNAL MEDICINE

## 2024-09-16 PROCEDURE — 3078F DIAST BP <80 MM HG: CPT | Performed by: INTERNAL MEDICINE

## 2024-09-16 PROCEDURE — 3048F LDL-C <100 MG/DL: CPT | Performed by: INTERNAL MEDICINE

## 2024-09-16 PROCEDURE — 3061F NEG MICROALBUMINURIA REV: CPT | Performed by: INTERNAL MEDICINE

## 2024-09-16 PROCEDURE — 3008F BODY MASS INDEX DOCD: CPT | Performed by: INTERNAL MEDICINE

## 2024-09-16 RX ORDER — KETOROLAC TROMETHAMINE 15 MG/ML
5 INJECTION, SOLUTION INTRAMUSCULAR; INTRAVENOUS ONCE
Status: DISCONTINUED | OUTPATIENT
Start: 2024-09-16 | End: 2024-09-16

## 2024-09-16 RX ORDER — LIDOCAINE 50 MG/G
1 PATCH TOPICAL DAILY
Qty: 30 PATCH | Refills: 0 | Status: SHIPPED | OUTPATIENT
Start: 2024-09-16 | End: 2024-09-18 | Stop reason: SDUPTHER

## 2024-09-16 RX ORDER — KETOROLAC TROMETHAMINE 30 MG/ML
30 INJECTION, SOLUTION INTRAMUSCULAR; INTRAVENOUS ONCE
Status: COMPLETED | OUTPATIENT
Start: 2024-09-16 | End: 2024-09-16

## 2024-09-16 ASSESSMENT — LIFESTYLE VARIABLES
HOW OFTEN DO YOU HAVE A DRINK CONTAINING ALCOHOL: NEVER
AUDIT-C TOTAL SCORE: 0
SKIP TO QUESTIONS 9-10: 1
HOW OFTEN DO YOU HAVE SIX OR MORE DRINKS ON ONE OCCASION: NEVER
HOW MANY STANDARD DRINKS CONTAINING ALCOHOL DO YOU HAVE ON A TYPICAL DAY: PATIENT DOES NOT DRINK

## 2024-09-16 ASSESSMENT — ENCOUNTER SYMPTOMS
DIFFICULTY URINATING: 0
FEVER: 0
FATIGUE: 0
ABDOMINAL PAIN: 0
SHORTNESS OF BREATH: 0

## 2024-09-16 ASSESSMENT — PAIN DESCRIPTION - LOCATION: LOCATION: BACK

## 2024-09-18 DIAGNOSIS — M54.42 ACUTE LEFT-SIDED LOW BACK PAIN WITH LEFT-SIDED SCIATICA: ICD-10-CM

## 2024-09-18 RX ORDER — LIDOCAINE 50 MG/G
1 PATCH TOPICAL DAILY
Qty: 30 PATCH | Refills: 0 | Status: SHIPPED | OUTPATIENT
Start: 2024-09-18 | End: 2024-10-18

## 2024-09-19 ENCOUNTER — HOSPITAL ENCOUNTER (OUTPATIENT)
Dept: RADIOLOGY | Facility: CLINIC | Age: 74
Discharge: HOME | End: 2024-09-19
Payer: MEDICARE

## 2024-09-19 DIAGNOSIS — M54.42 ACUTE LEFT-SIDED LOW BACK PAIN WITH LEFT-SIDED SCIATICA: ICD-10-CM

## 2024-09-19 PROCEDURE — 72131 CT LUMBAR SPINE W/O DYE: CPT

## 2024-09-23 ENCOUNTER — APPOINTMENT (OUTPATIENT)
Dept: RADIOLOGY | Facility: CLINIC | Age: 74
End: 2024-09-23
Payer: MEDICARE

## 2024-09-25 ENCOUNTER — APPOINTMENT (OUTPATIENT)
Dept: PRIMARY CARE | Facility: CLINIC | Age: 74
End: 2024-09-25
Payer: MEDICARE

## 2024-09-25 ENCOUNTER — TELEPHONE (OUTPATIENT)
Dept: PRIMARY CARE | Facility: CLINIC | Age: 74
End: 2024-09-25

## 2024-09-25 VITALS
HEIGHT: 67 IN | DIASTOLIC BLOOD PRESSURE: 70 MMHG | SYSTOLIC BLOOD PRESSURE: 113 MMHG | HEART RATE: 88 BPM | WEIGHT: 145 LBS | BODY MASS INDEX: 22.76 KG/M2

## 2024-09-25 DIAGNOSIS — M51.36 LUMBAR DEGENERATIVE DISC DISEASE: Primary | ICD-10-CM

## 2024-09-25 DIAGNOSIS — M54.40 ACUTE LOW BACK PAIN WITH SCIATICA, SCIATICA LATERALITY UNSPECIFIED, UNSPECIFIED BACK PAIN LATERALITY: ICD-10-CM

## 2024-09-25 PROCEDURE — 4010F ACE/ARB THERAPY RXD/TAKEN: CPT | Performed by: INTERNAL MEDICINE

## 2024-09-25 PROCEDURE — 3074F SYST BP LT 130 MM HG: CPT | Performed by: INTERNAL MEDICINE

## 2024-09-25 PROCEDURE — 1036F TOBACCO NON-USER: CPT | Performed by: INTERNAL MEDICINE

## 2024-09-25 PROCEDURE — 3044F HG A1C LEVEL LT 7.0%: CPT | Performed by: INTERNAL MEDICINE

## 2024-09-25 PROCEDURE — 3008F BODY MASS INDEX DOCD: CPT | Performed by: INTERNAL MEDICINE

## 2024-09-25 PROCEDURE — 99213 OFFICE O/P EST LOW 20 MIN: CPT | Performed by: INTERNAL MEDICINE

## 2024-09-25 PROCEDURE — 3061F NEG MICROALBUMINURIA REV: CPT | Performed by: INTERNAL MEDICINE

## 2024-09-25 PROCEDURE — 3048F LDL-C <100 MG/DL: CPT | Performed by: INTERNAL MEDICINE

## 2024-09-25 PROCEDURE — 3078F DIAST BP <80 MM HG: CPT | Performed by: INTERNAL MEDICINE

## 2024-09-25 RX ORDER — CYCLOBENZAPRINE HCL 5 MG
5 TABLET ORAL NIGHTLY PRN
Qty: 90 TABLET | Refills: 0 | Status: SHIPPED | OUTPATIENT
Start: 2024-09-25

## 2024-09-25 RX ORDER — MELOXICAM 7.5 MG/1
7.5 TABLET ORAL DAILY
Qty: 30 TABLET | Refills: 1 | Status: SHIPPED | OUTPATIENT
Start: 2024-09-25 | End: 2024-09-25

## 2024-09-25 RX ORDER — METHOCARBAMOL 500 MG/1
500 TABLET, FILM COATED ORAL 2 TIMES DAILY
Qty: 30 TABLET | Refills: 1 | Status: SHIPPED | OUTPATIENT
Start: 2024-09-25 | End: 2024-09-25

## 2024-09-25 ASSESSMENT — ENCOUNTER SYMPTOMS
WHEEZING: 0
COUGH: 0
ABDOMINAL PAIN: 0
VOMITING: 0
WOUND: 0
DIFFICULTY URINATING: 0
EYE DISCHARGE: 0
BACK PAIN: 1
FEVER: 0
CONFUSION: 0
FLANK PAIN: 0
NUMBNESS: 0
MYALGIAS: 0
DIARRHEA: 0
DIZZINESS: 0
DYSURIA: 0
SHORTNESS OF BREATH: 0
CHILLS: 0
NAUSEA: 0

## 2024-09-25 ASSESSMENT — LIFESTYLE VARIABLES
HOW OFTEN DO YOU HAVE A DRINK CONTAINING ALCOHOL: NEVER
HOW OFTEN DO YOU HAVE SIX OR MORE DRINKS ON ONE OCCASION: NEVER
HOW MANY STANDARD DRINKS CONTAINING ALCOHOL DO YOU HAVE ON A TYPICAL DAY: PATIENT DOES NOT DRINK
SKIP TO QUESTIONS 9-10: 1
AUDIT-C TOTAL SCORE: 0

## 2024-09-25 NOTE — ASSESSMENT & PLAN NOTE
-Her pain is mostly located on the left lower side of her back.  -Was recommended to continue physical therapy along with lidocaine patches and cyclobenzaprine twice a day.  -Will recommend to follow-up with the pain management.

## 2024-09-25 NOTE — ADDENDUM NOTE
Addended by: PRICILLA RIOS on: 9/25/2024 12:35 PM     Modules accepted: Level of Service    
negative...

## 2024-09-25 NOTE — PROGRESS NOTES
Subjective   Subjective:     History Of Present Illness:  Monica Trotter is a 74 y.o. female with a PMH of chronic low back pain with lumbar DJD, hernia repair, T2DM, HTN, HLD, who presents to [facility] clinic for follow up visit.  Since last visit patient has been complaining of persistent worsening left lower back pain with occasional radiation into her left lower extremity.  Her back pain worsens with prolonged standing, walking, and bending backwards.  She reports that she has done physical therapy and been taking cyclobenzaprine 3 times daily with minimal symptom resolution.  We discussed her recent CT imaging in detail.  Currently plan to follow-up with pain management.    Past Medical History:  She has a past medical history of Arthritis, Body mass index (BMI) 31.0-31.9, adult (12/07/2021), Body mass index (BMI) 32.0-32.9, adult (07/12/2021), Body mass index (BMI)30.0-30.9, adult (05/03/2022), Body mass index (BMI)30.0-30.9, adult (09/10/2020), Cataract, Encounter for immunization (09/28/2018), HTN (hypertension), Hyperlipidemia, Personal history of other drug therapy (03/11/2021), Type 2 diabetes mellitus (Multi), and Vitreous degeneration, unspecified eye.    Past Surgical History:  She has a past surgical history that includes Colonoscopy (03/18/2013); Hysterectomy; and Cholecystectomy.     Social History:  She reports that she has never smoked. She has been exposed to tobacco smoke. She has never used smokeless tobacco. She reports that she does not currently use alcohol. She reports that she does not currently use drugs.    Family History:  Family History   Problem Relation Name Age of Onset    No Known Problems Mother      No Known Problems Father       Allergies:  Sulfur and Sulfa (sulfonamide antibiotics)    Home Medications:  (Not in a hospital admission)    Review Of Systems:  11-point ROS was performed and is negative except as noted below and in the HPI.     Review of Systems   Constitutional:   "Negative for chills and fever.   Eyes:  Negative for discharge.   Respiratory:  Negative for cough, shortness of breath and wheezing.    Cardiovascular:  Negative for chest pain and leg swelling.   Gastrointestinal:  Negative for abdominal pain, diarrhea, nausea and vomiting.   Genitourinary:  Negative for difficulty urinating, dysuria and flank pain.   Musculoskeletal:  Positive for back pain. Negative for myalgias.   Skin:  Negative for wound.   Neurological:  Negative for dizziness and numbness.   Psychiatric/Behavioral:  Negative for confusion.         Objective   Objective:     /70 (BP Location: Right arm, Patient Position: Sitting, BP Cuff Size: Adult)   Pulse 88   Ht 1.702 m (5' 7\")   Wt 65.8 kg (145 lb)   BMI 22.71 kg/m²     Physical Exam  Constitutional:       General: She is not in acute distress.     Appearance: Normal appearance.   HENT:      Head: Normocephalic and atraumatic.      Mouth/Throat:      Mouth: Mucous membranes are moist.   Eyes:      Conjunctiva/sclera: Conjunctivae normal.      Pupils: Pupils are equal, round, and reactive to light.   Cardiovascular:      Rate and Rhythm: Normal rate and regular rhythm.      Heart sounds: Normal heart sounds.   Pulmonary:      Effort: No respiratory distress.      Breath sounds: Normal breath sounds. No wheezing or rhonchi.   Abdominal:      General: Bowel sounds are normal.      Palpations: Abdomen is soft.      Tenderness: There is no abdominal tenderness.   Musculoskeletal:         General: Tenderness present. No swelling.      Cervical back: Neck supple.      Comments: + GRACY test   Skin:     General: Skin is warm and dry.   Neurological:      General: No focal deficit present.      Mental Status: She is alert.          Assessment & Plan:     Assessment/Plan     Problem List Items Addressed This Visit       Low back pain     -Her pain is mostly located on the left lower side of her back.  -Was recommended to continue physical therapy along " with lidocaine patches and cyclobenzaprine twice a day.  -Will recommend to follow-up with the pain management.         Relevant Medications    cyclobenzaprine (Flexeril) 5 mg tablet    Lumbar degenerative disc disease - Primary    Relevant Orders    Referral to Pain Medicine     #Health Maintenance:  - Routine labs next visit   - DEXA scan pending collection  - Mammography pending collection  - Colonoscopy due 4/2027 (last done 4/2022)    Revisit Topics: left low back pain    Dispo: Patient is scheduled to return to clinic in 3 months.    Mikel Rodriguez, PGY-3  Internal Medicine     Disclaimer: Documentation completed with the information available at the time of input. The times in the chart may not be reflective of actual patient care times, interventions, or procedures. Documentation occurs after the physical care of the patient.

## 2024-09-26 NOTE — TELEPHONE ENCOUNTER
Patient is calling states was supposed to stop taking the cyclobenzaprine and start taking the meloxicam and robaxin.   However the flexeril was called in.

## 2024-09-27 ENCOUNTER — OFFICE VISIT (OUTPATIENT)
Dept: PAIN MEDICINE | Facility: HOSPITAL | Age: 74
End: 2024-09-27
Payer: MEDICARE

## 2024-09-27 DIAGNOSIS — M54.16 LUMBAR RADICULOPATHY: Primary | ICD-10-CM

## 2024-09-27 DIAGNOSIS — M47.817 LUMBOSACRAL SPONDYLOSIS WITHOUT MYELOPATHY: ICD-10-CM

## 2024-09-27 DIAGNOSIS — M51.36 LUMBAR DEGENERATIVE DISC DISEASE: ICD-10-CM

## 2024-09-27 PROCEDURE — 3048F LDL-C <100 MG/DL: CPT | Performed by: PAIN MEDICINE

## 2024-09-27 PROCEDURE — 3044F HG A1C LEVEL LT 7.0%: CPT | Performed by: PAIN MEDICINE

## 2024-09-27 PROCEDURE — 99214 OFFICE O/P EST MOD 30 MIN: CPT | Performed by: PAIN MEDICINE

## 2024-09-27 PROCEDURE — 4010F ACE/ARB THERAPY RXD/TAKEN: CPT | Performed by: PAIN MEDICINE

## 2024-09-27 PROCEDURE — 99204 OFFICE O/P NEW MOD 45 MIN: CPT | Performed by: PAIN MEDICINE

## 2024-09-27 PROCEDURE — 3061F NEG MICROALBUMINURIA REV: CPT | Performed by: PAIN MEDICINE

## 2024-09-27 ASSESSMENT — PAIN - FUNCTIONAL ASSESSMENT: PAIN_FUNCTIONAL_ASSESSMENT: 0-10

## 2024-09-27 ASSESSMENT — PAIN SCALES - GENERAL: PAINLEVEL_OUTOF10: 5 - MODERATE PAIN

## 2024-09-27 NOTE — PROGRESS NOTES
Subjective   Patient ID: Monica Trotter is a 74 y.o. female with a past medical history of left low back/hip pain    HPI:   Monica is here as a new patient for evaluation of acute exacerbation of low back/hip pain.  This pain has been present for years, but was previously well-tolerated with yoga, stretching, aquatic therapy.  About 2 weeks ago she went to  a heavy object and had acute worsening of her pain.  The pain is most prominent around her left hip.  She does endorse occasional numbness and tingling that radiates down the anterior side of her left thigh down to her knee.  She has not been to participate in her home exercise regimen as well due to the pain.  She saw her PCP, who prescribed her Flexeril, naproxen, Tylenol with minimal relief.        Review of Systems   13-point ROS done and negative except for HPI.     Current Outpatient Medications   Medication Instructions    acetaminophen (TYLENOL 8 HOUR) 650 mg, oral, Every 8 hours PRN, Do not crush, chew, or split.    amitriptyline (ELAVIL) 25 mg, oral, Nightly    chlorthalidone (HYGROTON) 25 mg, oral, Daily    cholecalciferol (VITAMIN D3) 5,000 Units, oral, Daily    cyclobenzaprine (FLEXERIL) 5 mg, oral, Nightly PRN    flaxseed oiL oil 1,500 Units, miscellaneous, Daily    FreeStyle Lite Strips strip Use to test blood sugars twice daily    inulin (FIBER GUMMIES ORAL) 2 tablets, oral, Daily    lidocaine (Lidoderm) 5 % patch 1 patch, transdermal, Daily, Apply to painful area 12 hours per day, remove for 12 hours.    lisinopril 10 mg, oral, Daily    rosuvastatin (CRESTOR) 40 mg, oral, Daily    semaglutide 0.5 mg, subcutaneous, Once Weekly       Past Medical History:   Diagnosis Date    Arthritis     Body mass index (BMI) 31.0-31.9, adult 12/07/2021    BMI 31.0-31.9,adult    Body mass index (BMI) 32.0-32.9, adult 07/12/2021    Body mass index (BMI) of 32.0 to 32.9 in adult    Body mass index (BMI)30.0-30.9, adult 05/03/2022    Body mass index (BMI) of  30.0 to 30.9 in adult    Body mass index (BMI)30.0-30.9, adult 09/10/2020    BMI 30.0-30.9,adult    Cataract     Encounter for immunization 09/28/2018    Need for prophylactic vaccination and inoculation against influenza    HTN (hypertension)     Hyperlipidemia     Personal history of other drug therapy 03/11/2021    History of pneumococcal vaccination    Type 2 diabetes mellitus (Multi)     Vitreous degeneration, unspecified eye     Vitreous degeneration        Past Surgical History:   Procedure Laterality Date    CHOLECYSTECTOMY      COLONOSCOPY  03/18/2013    Complete Colonoscopy    HYSTERECTOMY          Family History   Problem Relation Name Age of Onset    No Known Problems Mother      No Known Problems Father          Allergies   Allergen Reactions    Sulfur Unknown    Sulfa (Sulfonamide Antibiotics) Unknown and Rash        Objective     There were no vitals filed for this visit.     Physical Exam  General: NAD, well groomed, well nourished  Eyes: Non-icteric sclera, EOMI  Ears, Nose, Mouth, and Throat: External ears and nose appear to be without deformity or rash. No lesions or masses noted. Hearing is grossly intact.   Neck: Trachea midline  Respiratory: Nonlabored breathing   Cardiovascular: no peripheral edema   Skin: No rashes or open lesions/ulcers identified on skin.    Back:   Palpation: No tenderness to palpation over lumbar paraspinous muscles.   Straight leg raise: does not reproduce their pain, bilaterally   GRACY Maneuver does not reproduce pain bilaterally    Hip:  No pain with internal or external rotation bilaterally    Neurologic:   Cranial nerves grossly intact.   Strength 5/5 and symmetric plantar/dorsiflexion   Sensation: Normal to light touch throughout, pinprick  intact throughout.    Psychiatric: Alert, orientation to person, place, and time. Cooperative.    Imaging personally reviewed and independently interpreted:   CT lumbar 2024  Worst pathology at L4-5 with a posterior disc bulge  contributing to moderate to severe central canal stenosis with bilateral moderate neuroforaminal stenosis       At the L5/S1 level, there is a mild posterior disc bulge along with  degenerative facet changes and ligamentum flavum hypertrophy  contributing to mild spinal canal narrowing. There is moderate  encroachment upon the neural foramen bilaterally.      At the L4/5 level, there is 3 mm of anterolisthesis of L4 on L5, a  posterior disc bulge, along with hypertrophic degenerative facet  changes and ligamentum flavum hypertrophy contributing to severe  spinal canal narrowing. There is moderate bilateral neural foraminal  narrowing.      At the L3/4 level, there is 2 mm of retrolisthesis of L3 on L4,  posterior disc bulge along with degenerative facet changes and  ligamentum flavum hypertrophy contributing to mild overall narrowing  of the thecal sac within the spinal canal. There is mild encroachment  upon the neural foramen bilaterally.      At the L2/3 level, there are hypertrophic degenerative facet changes  left greater than right and ligamentum flavum hypertrophy along with  a mild posterior disc bulge contributing to mild-to-moderate spinal  canal narrowing. There is mild-to-moderate encroachment upon the left  neural foramen. There is no significant right-sided neural foraminal  narrowing.      At the L1/2 level, there is a mild posterior disc bulge along with  degenerative facet changes without significant spinal canal or neural  foraminal narrowing.    Assessment/Plan   Monica has lumbar radicular pain.  Her clinical picture is not suggestive of any 1 particular nerve root at this time.  She does have a CT that shows severe stenosis, worse at L3-4, L4-5.  We will plan for L4-5 LESI for her radicular pain.  If is not adequate treat her pain, could consider lumbar MRI to plan for transforaminal approach.  She is not interested in starting a medication at this time.  She is also on an excellent home exercise  regimen.  She does aquatic therapy, yoga, walking on treadmill, stretching as often as possible.  She also had a initial evaluation for physical therapy in June.    Plan:  -Schedule for L4-5 LESI.  Denies blood thinner use.     The patient has failed treatment with : Physical therapy , three or more classes of medications, have significant limitations of their quality of life due to the pain, have significant impairments of their activities of daily living (ADLs) due to the pain, and have significant impairments of their instrumental activities of daily living (IADLs) due to the pain    We discussed  the risks, benefits and alternatives of the procedure including but not limited to: , Epidural hematoma, Post-dural puncture headache, Lack of efficacy , Transiently worsening pain , Bleeding, Infection , and Nerve Damage    Follow up: As needed     The patient was invited to contact us back anytime with any questions or concerns and follow-up with us in the office as needed.     Diagnoses and all orders for this visit:  Lumbar degenerative disc disease  -     Referral to Pain Medicine      This note was generated with the aid of dictation software, there may be typos despite my attempts at proofreading.    Lemuel Perkins, DO  Pain Fellow

## 2024-10-02 NOTE — H&P
HISTORY AND PHYSICAL    History Of Present Illness  Monica Trotter is a 74 y.o. female presenting with chronic pain here for procedure as stated below.  Patient denies any changes in health since last visit in clinic.    Past Medical History  Past Medical History:   Diagnosis Date    Arthritis     Body mass index (BMI) 31.0-31.9, adult 12/07/2021    BMI 31.0-31.9,adult    Body mass index (BMI) 32.0-32.9, adult 07/12/2021    Body mass index (BMI) of 32.0 to 32.9 in adult    Body mass index (BMI)30.0-30.9, adult 05/03/2022    Body mass index (BMI) of 30.0 to 30.9 in adult    Body mass index (BMI)30.0-30.9, adult 09/10/2020    BMI 30.0-30.9,adult    Cataract     Encounter for immunization 09/28/2018    Need for prophylactic vaccination and inoculation against influenza    HTN (hypertension)     Hyperlipidemia     Personal history of other drug therapy 03/11/2021    History of pneumococcal vaccination    Type 2 diabetes mellitus (Multi)     Vitreous degeneration, unspecified eye     Vitreous degeneration       Surgical History  Past Surgical History:   Procedure Laterality Date    CHOLECYSTECTOMY      COLONOSCOPY  03/18/2013    Complete Colonoscopy    HYSTERECTOMY          Social History  She reports that she has never smoked. She has been exposed to tobacco smoke. She has never used smokeless tobacco. She reports that she does not currently use alcohol. She reports that she does not currently use drugs.    Family History  Family History   Problem Relation Name Age of Onset    No Known Problems Mother      No Known Problems Father          Allergies  Sulfur and Sulfa (sulfonamide antibiotics)    Review of Systems   12 point ROS done and negative except for the above.   Physical Exam     General: NAD, well groomed, well nourished  Eyes: Non-icteric sclera, EOMI  Ears, Nose, Mouth, and Throat: External ears and nose appear to be without deformity or rash. No lesions or masses noted. Hearing is grossly intact.   Neck:  Trachea midline  Respiratory: Nonlabored breathing   Cardiovascular: No peripheral edema   Skin: No rashes or open lesions/ulcers identified on skin.    Last Recorded Vitals  There were no vitals taken for this visit.    Relevant Results           Assessment/Plan   Monica Trotter is a 74 y.o. female presenting with chronic pain here for Lumbar interlaminar epidural steroid injection at the L4-5 level; she denies any recent antibiotic use or infections, she denies any blood thinner use, and she denies contrast or local anesthetic allergies.    ASA PS Classification: 3  VAS Pre-procedure: 5/10 (10 being the worst)  VAS Post-procedure: See procedure note.    Plan:  - We will proceed with the procedure as above. We discussed extensively the risks, benefits, and alternatives to the procedure. The patient's questions were addressed and answered in detail. The patient demonstrated understanding of the procedure, and is amenable to proceeding with it. The Risks of the procedure that were discussed with the patient include but are not limited to the following: A lack of efficacy, transient worsening of pain, bleeding, infection, nerve injury, nerve damage, neuritis or sunburn sensation. Informed consent obtained as attached to EMR.  - Patient will follow-up with us in clinic.  - Patient to continue physician directed home exercises as tolerated.      Julia Betancourt MD  Chronic Pain Fellow  [facility]

## 2024-10-03 ENCOUNTER — HOSPITAL ENCOUNTER (OUTPATIENT)
Facility: HOSPITAL | Age: 74
Discharge: HOME | End: 2024-10-03
Payer: MEDICARE

## 2024-10-03 VITALS
WEIGHT: 145 LBS | SYSTOLIC BLOOD PRESSURE: 121 MMHG | OXYGEN SATURATION: 100 % | DIASTOLIC BLOOD PRESSURE: 64 MMHG | RESPIRATION RATE: 16 BRPM | HEIGHT: 67 IN | TEMPERATURE: 98.2 F | HEART RATE: 73 BPM | BODY MASS INDEX: 22.76 KG/M2

## 2024-10-03 DIAGNOSIS — M54.16 LUMBAR RADICULOPATHY: ICD-10-CM

## 2024-10-03 PROCEDURE — 62323 NJX INTERLAMINAR LMBR/SAC: CPT | Performed by: PAIN MEDICINE

## 2024-10-03 PROCEDURE — 2550000001 HC RX 255 CONTRASTS: Performed by: PAIN MEDICINE

## 2024-10-03 PROCEDURE — 2500000004 HC RX 250 GENERAL PHARMACY W/ HCPCS (ALT 636 FOR OP/ED): Performed by: PAIN MEDICINE

## 2024-10-03 RX ORDER — DEXAMETHASONE SODIUM PHOSPHATE 10 MG/ML
INJECTION INTRAMUSCULAR; INTRAVENOUS
Status: COMPLETED | OUTPATIENT
Start: 2024-10-03 | End: 2024-10-03

## 2024-10-03 RX ORDER — BUPIVACAINE HYDROCHLORIDE 2.5 MG/ML
INJECTION, SOLUTION EPIDURAL; INFILTRATION; INTRACAUDAL
Status: DISCONTINUED
Start: 2024-10-03 | End: 2024-10-03 | Stop reason: WASHOUT

## 2024-10-03 RX ORDER — LIDOCAINE HYDROCHLORIDE 5 MG/ML
INJECTION, SOLUTION INFILTRATION; INTRAVENOUS
Status: DISPENSED
Start: 2024-10-03 | End: 2024-10-03

## 2024-10-03 RX ORDER — BUPIVACAINE HYDROCHLORIDE 5 MG/ML
INJECTION, SOLUTION EPIDURAL; INTRACAUDAL
Status: DISCONTINUED
Start: 2024-10-03 | End: 2024-10-03 | Stop reason: WASHOUT

## 2024-10-03 RX ORDER — DEXAMETHASONE SODIUM PHOSPHATE 10 MG/ML
INJECTION INTRAMUSCULAR; INTRAVENOUS
Status: DISPENSED
Start: 2024-10-03 | End: 2024-10-03

## 2024-10-03 RX ORDER — LIDOCAINE HYDROCHLORIDE 5 MG/ML
INJECTION, SOLUTION INFILTRATION; INTRAVENOUS
Status: COMPLETED | OUTPATIENT
Start: 2024-10-03 | End: 2024-10-03

## 2024-10-03 RX ORDER — LIDOCAINE HYDROCHLORIDE 5 MG/ML
INJECTION, SOLUTION INFILTRATION; INTRAVENOUS
Status: DISCONTINUED
Start: 2024-10-03 | End: 2024-10-03 | Stop reason: WASHOUT

## 2024-10-03 ASSESSMENT — ENCOUNTER SYMPTOMS
LOSS OF SENSATION IN FEET: 0
DEPRESSION: 0
OCCASIONAL FEELINGS OF UNSTEADINESS: 0

## 2024-10-03 ASSESSMENT — PAIN - FUNCTIONAL ASSESSMENT
PAIN_FUNCTIONAL_ASSESSMENT: 0-10

## 2024-10-03 ASSESSMENT — PAIN DESCRIPTION - DESCRIPTORS: DESCRIPTORS: BURNING

## 2024-10-03 ASSESSMENT — PAIN SCALES - GENERAL
PAINLEVEL_OUTOF10: 0 - NO PAIN
PAINLEVEL_OUTOF10: 5 - MODERATE PAIN

## 2024-10-03 NOTE — DISCHARGE INSTRUCTIONS
DISCHARGE INSTRUCTIONS FOR INJECTIONS     You underwent Lumbar interlaminar epidural steroid injection at the L4-5 level today    After most injections, it is recommended that you relax and limit your activity for the remainder of the day unless you have been told otherwise by your pain physician.  You should not drive a car, operate machinery, or make important legal decisions unless otherwise directed by your pain physician.  You may resume your normal activity, including exercise, tomorrow.      Keep a written pain diary of how much pain relief you experienced following the injection procedure and the length of time of pain relief you experienced pain relief. Following diagnostic injections like medial branch nerve blocks, sacroiliac joint blocks, stellate ganglion injections and other blocks, it is very important you record the specific amount of pain relief you experienced immediately after the injectionand how long it lasted. Your doctor will ask you for this information at your follow up visit.     For all injections, please keep the injection site dry and inspect the site for a couple of days. You may remove the Band-Aid the day of the injection at any time.     Some discomfort, bruising or slight swelling may occur at the injection site. This is not abnormal if it occurs.  If needed you may:    -Take over the counter medication such as Tylenol or Motrin.   -Apply an ice pack for 30 minutes, 2 to 3 times a day for the first 24 hours.     You may shower today; no soaking baths, hot tubs, whirlpools or swimming pools for two days.      If you are given steroids in your injection, it may take 3-5 days for the steroid medication to take effect. You may notice a worsening of your symptoms for 1-2 days after the injection. This is not abnormal.  You may use acetaminophen, ibuprofen, or prescription medication that your doctor may have prescribed for you if you need to do so.     A few common side effects of  steroids include facial flushing, sweating, restlessness, irritability,difficulty sleeping, increase in blood sugar, and increased blood pressure. If you have diabetes, please monitor your blood sugar at least once a day for at least 5 days. If you have poorly controlled high blood pressure, monitoryour blood pressure for at least 2 days and contact your primary care physician if these numbers are unusually high for you.      If you take aspirin or non-steroidal anti-inflammatory drugs (examples are Motrin, Advil, ibuprofen, Naprosyn, Voltaren, Relafen, etc.) you may restart these this evening, but stop taking it 3 days before your next appointment, unless instructed otherwiseby your physician.      You do not need to discontinue non-aspirin-containing pain medications prior to an injection (examples: Celebrex, tramadol, hydrocodone and acetaminophen).      If you take a blood thinning medication (Coumadin, Lovenox, Fragmin,Ticlid, Plavix, Pradaxa, etc.), please discuss this with your primary care physician/cardiologist and your pain physician. These medications MUST be discontinued before you can have an injection safely, without the risk of uncontrolled bleeding. If these medications are not discontinued for an appropriate period of time, you will not be able to receivean injection. Please adhere to instructions given to you about when to restart your blood thinning medication. If you have any questions please reach out to our team.    If you are taking Coumadin, please have your INR checked the morning of your procedure and bring the result to your appointment unless otherwise instructed. If your INR is over 1.2, your injection may need to be rescheduled to avoid uncontrolled bleeding from the needle placement.     Call [facility]  and ask for Pain Management at [phone] between 8am-4pm Monday - Friday if you are experiencing the following:    If you received an epidural or spinal injection:    -Headache that  doesnot go away with medicine, is worse when sitting or standing up, and is greatly relieved upon lying down.   -Severe pain worse than or different than your baseline pain.   -Chills or fever (101º F or greater).   -Drainage or signs of infection at the injection site     Go directly to the Emergency Department if you are experiencing the following and received an epidural or spinal injection:   -Abrupt weakness or progressive weakness in your legs that starts after you leave the clinic.   -Abrupt severe or worsening numbness in your legs.   -Inability to urinate after the injection or loss of bowel or bladder control without the urge to defecate or urinate.     If you have a clinical question that cannot wait until your next appointment, please call [phone] between 8am-4pm Monday - Friday or send a [employer] message. We do our best to return all non-emergency messages within 24 hours, Monday - Friday. A nurse or physician will return your message. You may also the appropriate nurse below, and they will do their best to answer your questions.  - For Dr. Roque, [place] [facility] [place] [phone]  - For Dr. Benoit, call Donna at [phone]  - For Dr. Collins, call Donna at [phone]  - For Dr. Hutchison, call Summer at [phone]  - For Dr. Mcnamara, call Summer at [phone]    If you need to cancel an appointment, please call the scheduling staff at [phone] during normal business hours or leave a message at least 24 hours in advance.     If you are going to be sedated for your next procedure, you MUST have responsible adult who can legally drive accompany you home. You cannot eat or drink for at least eight hours prior to the planned procedure if you are going to receive sedation. You may take your non-blood thinning medications with a small sip of water.

## 2024-11-01 ENCOUNTER — HOSPITAL ENCOUNTER (OUTPATIENT)
Dept: RADIOLOGY | Facility: CLINIC | Age: 74
Discharge: HOME | End: 2024-11-01
Payer: MEDICARE

## 2024-11-01 DIAGNOSIS — E11.9 TYPE 2 DIABETES MELLITUS WITHOUT COMPLICATION, WITHOUT LONG-TERM CURRENT USE OF INSULIN (MULTI): ICD-10-CM

## 2024-11-01 DIAGNOSIS — Z78.0 MENOPAUSE: ICD-10-CM

## 2024-11-01 PROCEDURE — 77080 DXA BONE DENSITY AXIAL: CPT

## 2024-11-22 DIAGNOSIS — E78.5 HYPERLIPIDEMIA, UNSPECIFIED HYPERLIPIDEMIA TYPE: ICD-10-CM

## 2024-11-22 DIAGNOSIS — M54.12 CERVICAL RADICULOPATHY: ICD-10-CM

## 2024-11-22 RX ORDER — ROSUVASTATIN CALCIUM 40 MG/1
40 TABLET, COATED ORAL DAILY
Qty: 90 TABLET | Refills: 3 | Status: SHIPPED | OUTPATIENT
Start: 2024-11-22

## 2024-11-22 RX ORDER — AMITRIPTYLINE HYDROCHLORIDE 25 MG/1
25 TABLET, FILM COATED ORAL NIGHTLY
Qty: 90 TABLET | Refills: 3 | Status: SHIPPED | OUTPATIENT
Start: 2024-11-22

## 2024-12-03 ENCOUNTER — OFFICE VISIT (OUTPATIENT)
Dept: PRIMARY CARE | Facility: CLINIC | Age: 74
End: 2024-12-03
Payer: MEDICARE

## 2024-12-03 VITALS
BODY MASS INDEX: 22.6 KG/M2 | SYSTOLIC BLOOD PRESSURE: 110 MMHG | DIASTOLIC BLOOD PRESSURE: 67 MMHG | HEART RATE: 90 BPM | WEIGHT: 144 LBS | HEIGHT: 67 IN

## 2024-12-03 DIAGNOSIS — D64.9 ANEMIA, UNSPECIFIED TYPE: ICD-10-CM

## 2024-12-03 DIAGNOSIS — D64.9 FATIGUE ASSOCIATED WITH ANEMIA: ICD-10-CM

## 2024-12-03 DIAGNOSIS — E11.9 TYPE 2 DIABETES MELLITUS WITHOUT COMPLICATION, WITHOUT LONG-TERM CURRENT USE OF INSULIN (MULTI): ICD-10-CM

## 2024-12-03 DIAGNOSIS — M54.12 CERVICAL RADICULOPATHY: ICD-10-CM

## 2024-12-03 DIAGNOSIS — R53.83 OTHER FATIGUE: ICD-10-CM

## 2024-12-03 DIAGNOSIS — M81.0 POST-MENOPAUSAL OSTEOPOROSIS: ICD-10-CM

## 2024-12-03 DIAGNOSIS — R23.4 THINNING OF SKIN: Primary | ICD-10-CM

## 2024-12-03 DIAGNOSIS — I10 BENIGN ESSENTIAL HYPERTENSION: ICD-10-CM

## 2024-12-03 DIAGNOSIS — E78.9 LIPID DISORDER: ICD-10-CM

## 2024-12-03 DIAGNOSIS — E55.9 VITAMIN D INSUFFICIENCY: ICD-10-CM

## 2024-12-03 DIAGNOSIS — E78.5 HYPERLIPIDEMIA, UNSPECIFIED HYPERLIPIDEMIA TYPE: ICD-10-CM

## 2024-12-03 PROCEDURE — 3074F SYST BP LT 130 MM HG: CPT | Performed by: INTERNAL MEDICINE

## 2024-12-03 PROCEDURE — G2211 COMPLEX E/M VISIT ADD ON: HCPCS | Performed by: INTERNAL MEDICINE

## 2024-12-03 PROCEDURE — 3044F HG A1C LEVEL LT 7.0%: CPT | Performed by: INTERNAL MEDICINE

## 2024-12-03 PROCEDURE — 4010F ACE/ARB THERAPY RXD/TAKEN: CPT | Performed by: INTERNAL MEDICINE

## 2024-12-03 PROCEDURE — 3048F LDL-C <100 MG/DL: CPT | Performed by: INTERNAL MEDICINE

## 2024-12-03 PROCEDURE — 3078F DIAST BP <80 MM HG: CPT | Performed by: INTERNAL MEDICINE

## 2024-12-03 PROCEDURE — 3008F BODY MASS INDEX DOCD: CPT | Performed by: INTERNAL MEDICINE

## 2024-12-03 PROCEDURE — 1036F TOBACCO NON-USER: CPT | Performed by: INTERNAL MEDICINE

## 2024-12-03 PROCEDURE — 99214 OFFICE O/P EST MOD 30 MIN: CPT | Performed by: INTERNAL MEDICINE

## 2024-12-03 PROCEDURE — 3061F NEG MICROALBUMINURIA REV: CPT | Performed by: INTERNAL MEDICINE

## 2024-12-03 RX ORDER — METFORMIN HYDROCHLORIDE 1000 MG/1
1000 TABLET ORAL
Qty: 30 TABLET | Refills: 0 | Status: SHIPPED | OUTPATIENT
Start: 2024-12-03 | End: 2024-12-07 | Stop reason: HOSPADM

## 2024-12-03 RX ORDER — AMITRIPTYLINE HYDROCHLORIDE 25 MG/1
25 TABLET, FILM COATED ORAL NIGHTLY
Qty: 90 TABLET | Refills: 3 | Status: SHIPPED | OUTPATIENT
Start: 2024-12-03

## 2024-12-03 RX ORDER — LISINOPRIL 10 MG/1
10 TABLET ORAL DAILY
Qty: 90 TABLET | Refills: 3 | Status: SHIPPED | OUTPATIENT
Start: 2024-12-03 | End: 2024-12-07 | Stop reason: HOSPADM

## 2024-12-03 RX ORDER — CHLORTHALIDONE 25 MG/1
25 TABLET ORAL DAILY
Qty: 90 TABLET | Refills: 3 | Status: SHIPPED | OUTPATIENT
Start: 2024-12-03 | End: 2024-12-07 | Stop reason: HOSPADM

## 2024-12-03 RX ORDER — ACETAMINOPHEN 500 MG
5000 TABLET ORAL DAILY
Qty: 90 TABLET | Refills: 3 | Status: SHIPPED | OUTPATIENT
Start: 2024-12-03

## 2024-12-03 RX ORDER — ROSUVASTATIN CALCIUM 40 MG/1
40 TABLET, COATED ORAL DAILY
Qty: 90 TABLET | Refills: 3 | Status: SHIPPED | OUTPATIENT
Start: 2024-12-03

## 2024-12-03 RX ORDER — METFORMIN HYDROCHLORIDE 1000 MG/1
1000 TABLET ORAL
COMMUNITY
End: 2024-12-03 | Stop reason: SDUPTHER

## 2024-12-03 ASSESSMENT — LIFESTYLE VARIABLES
HOW OFTEN DO YOU HAVE SIX OR MORE DRINKS ON ONE OCCASION: NEVER
HOW MANY STANDARD DRINKS CONTAINING ALCOHOL DO YOU HAVE ON A TYPICAL DAY: 1 OR 2
SKIP TO QUESTIONS 9-10: 1
AUDIT-C TOTAL SCORE: 1
HOW OFTEN DO YOU HAVE A DRINK CONTAINING ALCOHOL: MONTHLY OR LESS

## 2024-12-03 NOTE — PROGRESS NOTES
Subjective   Patient ID: Monica Trotter is a 74 y.o. female who presents for Follow-up (Dizziness a few weeks ).  Feels fatigued and dizzy. No LOC. BG at home 58. Doesn't eat whole lot. Has been losing weight. Continues to take semaglutide and metformin.    Objective   There were no vitals taken for this visit.    Physical Exam  Constitutional: Appears stated age. In NAD.   HEENT: NC/AT, EOMI, clear sclera, moist mucous membranes  CV: RRR, No M/R/G  PULM: CTAB, no coughing or wheezing  ABDOMEN: Soft, NT/ND. No TTP. + BSx4.  SKIN: Normal Color, Warm, Dry, No Rashes   EXTREMITIES: Non-Tender, Full ROM, No Pedal Edema  NEURO: A&O x 4, nonfocal neurological exam.  PSYCH: Normal Mood & Behavior    Assessment/Plan   Problem List Items Addressed This Visit       Anemia    Relevant Orders    Ferritin    Diabetes mellitus, type 2 (Multi)    Relevant Medications    semaglutide 0.25 mg or 0.5 mg (2 mg/3 mL) pen injector    Fatigue    Relevant Orders    Iron and TIBC    Ferritin    Vitamin D 25-Hydroxy,Total (for eval of Vitamin D levels)    Tsh With Reflex To Free T4 If Abnormal    Vitamin B12    Folate    CBC    Renal function panel    Albumin-Creatinine Ratio, Urine Random    Lipid panel     Other Visit Diagnoses       Thinning of skin    -  Primary    Relevant Orders    Referral to Dermatology    Fatigue associated with anemia        Relevant Orders    Iron and TIBC    Post-menopausal osteoporosis        Relevant Orders    Vitamin D 25-Hydroxy,Total (for eval of Vitamin D levels)    Lipid disorder        Relevant Orders    Lipid panel          73 y/o female w/ PMH of HTN, HLD, T2DM and lumbar radicular pain presents for a follow up.    #Dizziness possibly due to possibly hypoglycemic episodes  #Fatigue  #Hx of T2DM, A1C 6.0  - Semaglutide decrease to 0.25 and will possibly stop  - Patient continues to take metformin 1000mg   - Will check iron studies, TSH, A1C, Vitamin D, B12/Folate    #Lumbar radicular pain  - CT that shows  severe stenosis, worse at L3-4, L4-5.   - Follows with Pain management- plan for L4-L5 LESI for radicular pain  She does aquatic therapy, yoga, walking on treadmill, stretching as often as possible.   - Continue home exercise regimen

## 2024-12-04 ENCOUNTER — APPOINTMENT (OUTPATIENT)
Dept: RADIOLOGY | Facility: HOSPITAL | Age: 74
End: 2024-12-04
Payer: MEDICARE

## 2024-12-04 ENCOUNTER — LAB (OUTPATIENT)
Dept: LAB | Facility: LAB | Age: 74
End: 2024-12-04
Payer: MEDICARE

## 2024-12-04 ENCOUNTER — APPOINTMENT (OUTPATIENT)
Dept: CARDIOLOGY | Facility: HOSPITAL | Age: 74
End: 2024-12-04
Payer: MEDICARE

## 2024-12-04 ENCOUNTER — HOSPITAL ENCOUNTER (INPATIENT)
Facility: HOSPITAL | Age: 74
LOS: 3 days | Discharge: HOME | End: 2024-12-07
Attending: STUDENT IN AN ORGANIZED HEALTH CARE EDUCATION/TRAINING PROGRAM | Admitting: HOSPITALIST
Payer: MEDICARE

## 2024-12-04 DIAGNOSIS — M81.0 POST-MENOPAUSAL OSTEOPOROSIS: ICD-10-CM

## 2024-12-04 DIAGNOSIS — R53.83 OTHER FATIGUE: ICD-10-CM

## 2024-12-04 DIAGNOSIS — N17.9 AKI (ACUTE KIDNEY INJURY) (CMS-HCC): Primary | ICD-10-CM

## 2024-12-04 DIAGNOSIS — D64.9 ANEMIA, UNSPECIFIED TYPE: ICD-10-CM

## 2024-12-04 DIAGNOSIS — N28.1 RENAL CYST: ICD-10-CM

## 2024-12-04 DIAGNOSIS — D64.9 FATIGUE ASSOCIATED WITH ANEMIA: ICD-10-CM

## 2024-12-04 DIAGNOSIS — K92.2 GASTROINTESTINAL HEMORRHAGE, UNSPECIFIED GASTROINTESTINAL HEMORRHAGE TYPE: ICD-10-CM

## 2024-12-04 DIAGNOSIS — E78.9 LIPID DISORDER: ICD-10-CM

## 2024-12-04 LAB
25(OH)D3 SERPL-MCNC: 16 NG/ML (ref 30–100)
ABO GROUP (TYPE) IN BLOOD: NORMAL
ALBUMIN SERPL BCP-MCNC: 3.9 G/DL (ref 3.4–5)
ALBUMIN SERPL BCP-MCNC: 4.2 G/DL (ref 3.4–5)
ALP SERPL-CCNC: 63 U/L (ref 33–136)
ALT SERPL W P-5'-P-CCNC: 26 U/L (ref 7–45)
ANION GAP SERPL CALC-SCNC: 13 MMOL/L (ref 10–20)
ANION GAP SERPL CALC-SCNC: 14 MMOL/L (ref 10–20)
ANTIBODY SCREEN: NORMAL
APPEARANCE UR: CLEAR
AST SERPL W P-5'-P-CCNC: 31 U/L (ref 9–39)
BASOPHILS # BLD AUTO: 0.07 X10*3/UL (ref 0–0.1)
BASOPHILS NFR BLD AUTO: 1.5 %
BILIRUB SERPL-MCNC: 0.3 MG/DL (ref 0–1.2)
BILIRUB UR STRIP.AUTO-MCNC: NEGATIVE MG/DL
BUN SERPL-MCNC: 34 MG/DL (ref 6–23)
BUN SERPL-MCNC: 34 MG/DL (ref 6–23)
CALCIUM SERPL-MCNC: 8.7 MG/DL (ref 8.6–10.3)
CALCIUM SERPL-MCNC: 8.8 MG/DL (ref 8.6–10.3)
CHLORIDE SERPL-SCNC: 103 MMOL/L (ref 98–107)
CHLORIDE SERPL-SCNC: 103 MMOL/L (ref 98–107)
CHOLEST SERPL-MCNC: 99 MG/DL (ref 0–199)
CHOLESTEROL/HDL RATIO: 2
CO2 SERPL-SCNC: 25 MMOL/L (ref 21–32)
CO2 SERPL-SCNC: 25 MMOL/L (ref 21–32)
COLOR UR: ABNORMAL
CREAT SERPL-MCNC: 2.13 MG/DL (ref 0.5–1.05)
CREAT SERPL-MCNC: 2.17 MG/DL (ref 0.5–1.05)
CREAT UR-MCNC: 100.6 MG/DL (ref 20–320)
EGFRCR SERPLBLD CKD-EPI 2021: 23 ML/MIN/1.73M*2
EGFRCR SERPLBLD CKD-EPI 2021: 24 ML/MIN/1.73M*2
EOSINOPHIL # BLD AUTO: 0.08 X10*3/UL (ref 0–0.4)
EOSINOPHIL NFR BLD AUTO: 1.7 %
ERYTHROCYTE [DISTWIDTH] IN BLOOD BY AUTOMATED COUNT: 13.5 % (ref 11.5–14.5)
ERYTHROCYTE [DISTWIDTH] IN BLOOD BY AUTOMATED COUNT: 13.6 % (ref 11.5–14.5)
FERRITIN SERPL-MCNC: 20 NG/ML (ref 8–150)
FOLATE SERPL-MCNC: 21.5 NG/ML
GLUCOSE SERPL-MCNC: 115 MG/DL (ref 74–99)
GLUCOSE SERPL-MCNC: 93 MG/DL (ref 74–99)
GLUCOSE UR STRIP.AUTO-MCNC: NORMAL MG/DL
HCT VFR BLD AUTO: 23.6 % (ref 36–46)
HCT VFR BLD AUTO: 24.6 % (ref 36–46)
HDLC SERPL-MCNC: 50.5 MG/DL
HEMOCCULT SP1 STL QL: POSITIVE
HGB BLD-MCNC: 7.3 G/DL (ref 12–16)
HGB BLD-MCNC: 7.7 G/DL (ref 12–16)
HGB RETIC QN: 30 PG (ref 28–38)
HGB RETIC QN: 31 PG (ref 28–38)
IMM GRANULOCYTES # BLD AUTO: 0.01 X10*3/UL (ref 0–0.5)
IMM GRANULOCYTES NFR BLD AUTO: 0.2 % (ref 0–0.9)
IMMATURE RETIC FRACTION: 15.7 %
IMMATURE RETIC FRACTION: 19.1 %
INR PPP: 1.1 (ref 0.9–1.1)
IRON SATN MFR SERPL: 23 % (ref 25–45)
IRON SERPL-MCNC: 97 UG/DL (ref 35–150)
KETONES UR STRIP.AUTO-MCNC: ABNORMAL MG/DL
LDH SERPL L TO P-CCNC: 175 U/L (ref 84–246)
LDLC SERPL CALC-MCNC: 34 MG/DL
LEUKOCYTE ESTERASE UR QL STRIP.AUTO: ABNORMAL
LYMPHOCYTES # BLD AUTO: 1.81 X10*3/UL (ref 0.8–3)
LYMPHOCYTES NFR BLD AUTO: 39.4 %
MCH RBC QN AUTO: 27 PG (ref 26–34)
MCH RBC QN AUTO: 27.2 PG (ref 26–34)
MCHC RBC AUTO-ENTMCNC: 30.9 G/DL (ref 32–36)
MCHC RBC AUTO-ENTMCNC: 31.3 G/DL (ref 32–36)
MCV RBC AUTO: 87 FL (ref 80–100)
MCV RBC AUTO: 87 FL (ref 80–100)
MICROALBUMIN UR-MCNC: 100.9 MG/L
MICROALBUMIN/CREAT UR: 100.3 UG/MG CREAT
MONOCYTES # BLD AUTO: 0.5 X10*3/UL (ref 0.05–0.8)
MONOCYTES NFR BLD AUTO: 10.9 %
NEUTROPHILS # BLD AUTO: 2.12 X10*3/UL (ref 1.6–5.5)
NEUTROPHILS NFR BLD AUTO: 46.3 %
NITRITE UR QL STRIP.AUTO: NEGATIVE
NON HDL CHOLESTEROL: 49 MG/DL (ref 0–149)
NRBC BLD-RTO: 0 /100 WBCS (ref 0–0)
NRBC BLD-RTO: 0 /100 WBCS (ref 0–0)
PH UR STRIP.AUTO: 5.5 [PH]
PHOSPHATE SERPL-MCNC: 4 MG/DL (ref 2.5–4.9)
PLATELET # BLD AUTO: 265 X10*3/UL (ref 150–450)
PLATELET # BLD AUTO: 289 X10*3/UL (ref 150–450)
POTASSIUM SERPL-SCNC: 3.4 MMOL/L (ref 3.5–5.3)
POTASSIUM SERPL-SCNC: 3.5 MMOL/L (ref 3.5–5.3)
PROT SERPL-MCNC: 7.1 G/DL (ref 6.4–8.2)
PROT UR STRIP.AUTO-MCNC: NEGATIVE MG/DL
PROTHROMBIN TIME: 12.7 SECONDS (ref 9.8–12.8)
RBC # BLD AUTO: 2.7 X10*6/UL (ref 4–5.2)
RBC # BLD AUTO: 2.83 X10*6/UL (ref 4–5.2)
RBC # UR STRIP.AUTO: NEGATIVE /UL
RBC #/AREA URNS AUTO: NORMAL /HPF
RETICS #: 0.03 X10*6/UL (ref 0.02–0.11)
RETICS #: 0.04 X10*6/UL (ref 0.02–0.11)
RETICS/RBC NFR AUTO: 1.2 % (ref 0.5–2)
RETICS/RBC NFR AUTO: 1.4 % (ref 0.5–2)
RH FACTOR (ANTIGEN D): NORMAL
SODIUM SERPL-SCNC: 138 MMOL/L (ref 136–145)
SODIUM SERPL-SCNC: 138 MMOL/L (ref 136–145)
SP GR UR STRIP.AUTO: 1.01
SQUAMOUS #/AREA URNS AUTO: NORMAL /HPF
TIBC SERPL-MCNC: 420 UG/DL (ref 240–445)
TRIGL SERPL-MCNC: 72 MG/DL (ref 0–149)
TSH SERPL-ACNC: 1.37 MIU/L (ref 0.44–3.98)
UIBC SERPL-MCNC: 323 UG/DL (ref 110–370)
UROBILINOGEN UR STRIP.AUTO-MCNC: NORMAL MG/DL
VIT B12 SERPL-MCNC: 244 PG/ML (ref 211–911)
VLDL: 14 MG/DL (ref 0–40)
WBC # BLD AUTO: 4 X10*3/UL (ref 4.4–11.3)
WBC # BLD AUTO: 4.6 X10*3/UL (ref 4.4–11.3)
WBC #/AREA URNS AUTO: NORMAL /HPF

## 2024-12-04 PROCEDURE — 76770 US EXAM ABDO BACK WALL COMP: CPT

## 2024-12-04 PROCEDURE — 96361 HYDRATE IV INFUSION ADD-ON: CPT

## 2024-12-04 PROCEDURE — 74176 CT ABD & PELVIS W/O CONTRAST: CPT

## 2024-12-04 PROCEDURE — 86901 BLOOD TYPING SEROLOGIC RH(D): CPT | Performed by: STUDENT IN AN ORGANIZED HEALTH CARE EDUCATION/TRAINING PROGRAM

## 2024-12-04 PROCEDURE — 86850 RBC ANTIBODY SCREEN: CPT | Performed by: STUDENT IN AN ORGANIZED HEALTH CARE EDUCATION/TRAINING PROGRAM

## 2024-12-04 PROCEDURE — 1100000001 HC PRIVATE ROOM DAILY

## 2024-12-04 PROCEDURE — 83550 IRON BINDING TEST: CPT

## 2024-12-04 PROCEDURE — 85025 COMPLETE CBC W/AUTO DIFF WBC: CPT | Performed by: STUDENT IN AN ORGANIZED HEALTH CARE EDUCATION/TRAINING PROGRAM

## 2024-12-04 PROCEDURE — 93005 ELECTROCARDIOGRAM TRACING: CPT

## 2024-12-04 PROCEDURE — 83540 ASSAY OF IRON: CPT

## 2024-12-04 PROCEDURE — 80061 LIPID PANEL: CPT

## 2024-12-04 PROCEDURE — 85045 AUTOMATED RETICULOCYTE COUNT: CPT | Performed by: INTERNAL MEDICINE

## 2024-12-04 PROCEDURE — 99285 EMERGENCY DEPT VISIT HI MDM: CPT | Mod: 25 | Performed by: STUDENT IN AN ORGANIZED HEALTH CARE EDUCATION/TRAINING PROGRAM

## 2024-12-04 PROCEDURE — 85027 COMPLETE CBC AUTOMATED: CPT

## 2024-12-04 PROCEDURE — 74176 CT ABD & PELVIS W/O CONTRAST: CPT | Performed by: RADIOLOGY

## 2024-12-04 PROCEDURE — 2500000002 HC RX 250 W HCPCS SELF ADMINISTERED DRUGS (ALT 637 FOR MEDICARE OP, ALT 636 FOR OP/ED): Performed by: STUDENT IN AN ORGANIZED HEALTH CARE EDUCATION/TRAINING PROGRAM

## 2024-12-04 PROCEDURE — 87086 URINE CULTURE/COLONY COUNT: CPT | Mod: AHULAB | Performed by: STUDENT IN AN ORGANIZED HEALTH CARE EDUCATION/TRAINING PROGRAM

## 2024-12-04 PROCEDURE — 36415 COLL VENOUS BLD VENIPUNCTURE: CPT

## 2024-12-04 PROCEDURE — 80053 COMPREHEN METABOLIC PANEL: CPT

## 2024-12-04 PROCEDURE — 82728 ASSAY OF FERRITIN: CPT

## 2024-12-04 PROCEDURE — 36415 COLL VENOUS BLD VENIPUNCTURE: CPT | Performed by: STUDENT IN AN ORGANIZED HEALTH CARE EDUCATION/TRAINING PROGRAM

## 2024-12-04 PROCEDURE — 82306 VITAMIN D 25 HYDROXY: CPT

## 2024-12-04 PROCEDURE — 99223 1ST HOSP IP/OBS HIGH 75: CPT | Performed by: HOSPITALIST

## 2024-12-04 PROCEDURE — 96374 THER/PROPH/DIAG INJ IV PUSH: CPT

## 2024-12-04 PROCEDURE — 81001 URINALYSIS AUTO W/SCOPE: CPT | Performed by: STUDENT IN AN ORGANIZED HEALTH CARE EDUCATION/TRAINING PROGRAM

## 2024-12-04 PROCEDURE — 82570 ASSAY OF URINE CREATININE: CPT

## 2024-12-04 PROCEDURE — 82043 UR ALBUMIN QUANTITATIVE: CPT

## 2024-12-04 PROCEDURE — 82270 OCCULT BLOOD FECES: CPT | Performed by: STUDENT IN AN ORGANIZED HEALTH CARE EDUCATION/TRAINING PROGRAM

## 2024-12-04 PROCEDURE — 85610 PROTHROMBIN TIME: CPT | Performed by: STUDENT IN AN ORGANIZED HEALTH CARE EDUCATION/TRAINING PROGRAM

## 2024-12-04 PROCEDURE — 80069 RENAL FUNCTION PANEL: CPT

## 2024-12-04 PROCEDURE — 82746 ASSAY OF FOLIC ACID SERUM: CPT

## 2024-12-04 PROCEDURE — 84443 ASSAY THYROID STIM HORMONE: CPT

## 2024-12-04 PROCEDURE — 2500000004 HC RX 250 GENERAL PHARMACY W/ HCPCS (ALT 636 FOR OP/ED): Performed by: STUDENT IN AN ORGANIZED HEALTH CARE EDUCATION/TRAINING PROGRAM

## 2024-12-04 PROCEDURE — 86923 COMPATIBILITY TEST ELECTRIC: CPT

## 2024-12-04 PROCEDURE — 83615 LACTATE (LD) (LDH) ENZYME: CPT | Performed by: INTERNAL MEDICINE

## 2024-12-04 PROCEDURE — 82607 VITAMIN B-12: CPT

## 2024-12-04 RX ORDER — SODIUM CHLORIDE 9 MG/ML
100 INJECTION, SOLUTION INTRAVENOUS CONTINUOUS
Status: ACTIVE | OUTPATIENT
Start: 2024-12-05 | End: 2024-12-06

## 2024-12-04 RX ORDER — DEXTROSE 50 % IN WATER (D50W) INTRAVENOUS SYRINGE
12.5
Status: DISCONTINUED | OUTPATIENT
Start: 2024-12-04 | End: 2024-12-07 | Stop reason: HOSPADM

## 2024-12-04 RX ORDER — ROSUVASTATIN CALCIUM 20 MG/1
40 TABLET, COATED ORAL DAILY
Status: DISCONTINUED | OUTPATIENT
Start: 2024-12-05 | End: 2024-12-04

## 2024-12-04 RX ORDER — DEXTROSE 50 % IN WATER (D50W) INTRAVENOUS SYRINGE
25
Status: DISCONTINUED | OUTPATIENT
Start: 2024-12-04 | End: 2024-12-07 | Stop reason: HOSPADM

## 2024-12-04 RX ORDER — ROSUVASTATIN CALCIUM 10 MG/1
10 TABLET, COATED ORAL DAILY
Status: DISCONTINUED | OUTPATIENT
Start: 2024-12-05 | End: 2024-12-07 | Stop reason: HOSPADM

## 2024-12-04 RX ORDER — INSULIN LISPRO 100 [IU]/ML
0-10 INJECTION, SOLUTION INTRAVENOUS; SUBCUTANEOUS
Status: DISCONTINUED | OUTPATIENT
Start: 2024-12-05 | End: 2024-12-07 | Stop reason: HOSPADM

## 2024-12-04 RX ORDER — LISINOPRIL 10 MG/1
10 TABLET ORAL DAILY
Status: DISCONTINUED | OUTPATIENT
Start: 2024-12-05 | End: 2024-12-07 | Stop reason: HOSPADM

## 2024-12-04 RX ORDER — ENOXAPARIN SODIUM 100 MG/ML
30 INJECTION SUBCUTANEOUS
Status: DISCONTINUED | OUTPATIENT
Start: 2024-12-05 | End: 2024-12-07 | Stop reason: HOSPADM

## 2024-12-04 RX ORDER — TALC
3 POWDER (GRAM) TOPICAL NIGHTLY PRN
Status: DISCONTINUED | OUTPATIENT
Start: 2024-12-04 | End: 2024-12-07 | Stop reason: HOSPADM

## 2024-12-04 RX ORDER — ONDANSETRON HYDROCHLORIDE 2 MG/ML
4 INJECTION, SOLUTION INTRAVENOUS EVERY 8 HOURS PRN
Status: DISCONTINUED | OUTPATIENT
Start: 2024-12-04 | End: 2024-12-07 | Stop reason: HOSPADM

## 2024-12-04 RX ORDER — ACETAMINOPHEN 325 MG/1
650 TABLET ORAL EVERY 4 HOURS PRN
Status: DISCONTINUED | OUTPATIENT
Start: 2024-12-04 | End: 2024-12-07 | Stop reason: HOSPADM

## 2024-12-04 RX ORDER — CEFTRIAXONE 1 G/50ML
1 INJECTION, SOLUTION INTRAVENOUS
Status: DISCONTINUED | OUTPATIENT
Start: 2024-12-05 | End: 2024-12-05

## 2024-12-04 RX ORDER — ONDANSETRON 4 MG/1
4 TABLET, ORALLY DISINTEGRATING ORAL EVERY 8 HOURS PRN
Status: DISCONTINUED | OUTPATIENT
Start: 2024-12-04 | End: 2024-12-07 | Stop reason: HOSPADM

## 2024-12-04 RX ORDER — POTASSIUM CHLORIDE 20 MEQ/1
10 TABLET, EXTENDED RELEASE ORAL ONCE
Status: COMPLETED | OUTPATIENT
Start: 2024-12-04 | End: 2024-12-04

## 2024-12-04 RX ORDER — CYCLOBENZAPRINE HCL 5 MG
5 TABLET ORAL NIGHTLY PRN
Status: DISCONTINUED | OUTPATIENT
Start: 2024-12-04 | End: 2024-12-07 | Stop reason: HOSPADM

## 2024-12-04 RX ORDER — FERROUS SULFATE 325(65) MG
65 TABLET ORAL
Status: DISCONTINUED | OUTPATIENT
Start: 2024-12-05 | End: 2024-12-07 | Stop reason: HOSPADM

## 2024-12-04 RX ORDER — PANTOPRAZOLE SODIUM 40 MG/10ML
40 INJECTION, POWDER, LYOPHILIZED, FOR SOLUTION INTRAVENOUS ONCE
Status: COMPLETED | OUTPATIENT
Start: 2024-12-04 | End: 2024-12-04

## 2024-12-04 RX ORDER — AMITRIPTYLINE HYDROCHLORIDE 25 MG/1
25 TABLET, FILM COATED ORAL NIGHTLY
Status: DISCONTINUED | OUTPATIENT
Start: 2024-12-05 | End: 2024-12-07 | Stop reason: HOSPADM

## 2024-12-04 RX ORDER — POLYETHYLENE GLYCOL 3350 17 G/17G
17 POWDER, FOR SOLUTION ORAL DAILY
Status: DISCONTINUED | OUTPATIENT
Start: 2024-12-05 | End: 2024-12-07 | Stop reason: HOSPADM

## 2024-12-04 RX ORDER — CHOLECALCIFEROL (VITAMIN D3) 25 MCG
5000 TABLET ORAL DAILY
Status: DISCONTINUED | OUTPATIENT
Start: 2024-12-05 | End: 2024-12-07 | Stop reason: HOSPADM

## 2024-12-04 RX ORDER — CHLORTHALIDONE 25 MG/1
25 TABLET ORAL DAILY
Status: DISCONTINUED | OUTPATIENT
Start: 2024-12-05 | End: 2024-12-07 | Stop reason: HOSPADM

## 2024-12-04 SDOH — ECONOMIC STABILITY: INCOME INSECURITY: IN THE PAST 12 MONTHS HAS THE ELECTRIC, GAS, OIL, OR WATER COMPANY THREATENED TO SHUT OFF SERVICES IN YOUR HOME?: NO

## 2024-12-04 SDOH — HEALTH STABILITY: MENTAL HEALTH: HOW OFTEN DO YOU HAVE SIX OR MORE DRINKS ON ONE OCCASION?: NEVER

## 2024-12-04 SDOH — ECONOMIC STABILITY: FOOD INSECURITY: WITHIN THE PAST 12 MONTHS, THE FOOD YOU BOUGHT JUST DIDN'T LAST AND YOU DIDN'T HAVE MONEY TO GET MORE.: NEVER TRUE

## 2024-12-04 SDOH — HEALTH STABILITY: MENTAL HEALTH: HOW MANY DRINKS CONTAINING ALCOHOL DO YOU HAVE ON A TYPICAL DAY WHEN YOU ARE DRINKING?: 1 OR 2

## 2024-12-04 SDOH — ECONOMIC STABILITY: FOOD INSECURITY: WITHIN THE PAST 12 MONTHS, YOU WORRIED THAT YOUR FOOD WOULD RUN OUT BEFORE YOU GOT THE MONEY TO BUY MORE.: NEVER TRUE

## 2024-12-04 SDOH — SOCIAL STABILITY: SOCIAL INSECURITY: WITHIN THE LAST YEAR, HAVE YOU BEEN AFRAID OF YOUR PARTNER OR EX-PARTNER?: NO

## 2024-12-04 SDOH — SOCIAL STABILITY: SOCIAL INSECURITY
WITHIN THE LAST YEAR, HAVE YOU BEEN KICKED, HIT, SLAPPED, OR OTHERWISE PHYSICALLY HURT BY YOUR PARTNER OR EX-PARTNER?: NO

## 2024-12-04 SDOH — SOCIAL STABILITY: SOCIAL INSECURITY: DOES ANYONE TRY TO KEEP YOU FROM HAVING/CONTACTING OTHER FRIENDS OR DOING THINGS OUTSIDE YOUR HOME?: NO

## 2024-12-04 SDOH — HEALTH STABILITY: MENTAL HEALTH: HOW OFTEN DO YOU HAVE A DRINK CONTAINING ALCOHOL?: MONTHLY OR LESS

## 2024-12-04 SDOH — SOCIAL STABILITY: SOCIAL INSECURITY: ARE THERE ANY APPARENT SIGNS OF INJURIES/BEHAVIORS THAT COULD BE RELATED TO ABUSE/NEGLECT?: NO

## 2024-12-04 SDOH — SOCIAL STABILITY: SOCIAL INSECURITY
WITHIN THE LAST YEAR, HAVE YOU BEEN RAPED OR FORCED TO HAVE ANY KIND OF SEXUAL ACTIVITY BY YOUR PARTNER OR EX-PARTNER?: NO

## 2024-12-04 SDOH — SOCIAL STABILITY: SOCIAL INSECURITY: ARE YOU OR HAVE YOU BEEN THREATENED OR ABUSED PHYSICALLY, EMOTIONALLY, OR SEXUALLY BY ANYONE?: NO

## 2024-12-04 SDOH — SOCIAL STABILITY: SOCIAL INSECURITY: HAS ANYONE EVER THREATENED TO HURT YOUR FAMILY OR YOUR PETS?: NO

## 2024-12-04 SDOH — SOCIAL STABILITY: SOCIAL INSECURITY: HAVE YOU HAD THOUGHTS OF HARMING ANYONE ELSE?: NO

## 2024-12-04 SDOH — SOCIAL STABILITY: SOCIAL INSECURITY: ABUSE: ADULT

## 2024-12-04 SDOH — SOCIAL STABILITY: SOCIAL INSECURITY: WERE YOU ABLE TO COMPLETE ALL THE BEHAVIORAL HEALTH SCREENINGS?: YES

## 2024-12-04 SDOH — SOCIAL STABILITY: SOCIAL INSECURITY: DO YOU FEEL ANYONE HAS EXPLOITED OR TAKEN ADVANTAGE OF YOU FINANCIALLY OR OF YOUR PERSONAL PROPERTY?: NO

## 2024-12-04 SDOH — SOCIAL STABILITY: SOCIAL INSECURITY: WITHIN THE LAST YEAR, HAVE YOU BEEN HUMILIATED OR EMOTIONALLY ABUSED IN OTHER WAYS BY YOUR PARTNER OR EX-PARTNER?: NO

## 2024-12-04 SDOH — ECONOMIC STABILITY: FOOD INSECURITY: HOW HARD IS IT FOR YOU TO PAY FOR THE VERY BASICS LIKE FOOD, HOUSING, MEDICAL CARE, AND HEATING?: NOT HARD AT ALL

## 2024-12-04 SDOH — SOCIAL STABILITY: SOCIAL INSECURITY: DO YOU FEEL UNSAFE GOING BACK TO THE PLACE WHERE YOU ARE LIVING?: NO

## 2024-12-04 SDOH — SOCIAL STABILITY: SOCIAL INSECURITY: HAVE YOU HAD ANY THOUGHTS OF HARMING ANYONE ELSE?: NO

## 2024-12-04 ASSESSMENT — LIFESTYLE VARIABLES
SKIP TO QUESTIONS 9-10: 1
AUDIT-C TOTAL SCORE: 1

## 2024-12-04 ASSESSMENT — ACTIVITIES OF DAILY LIVING (ADL)
LACK_OF_TRANSPORTATION: NO
JUDGMENT_ADEQUATE_SAFELY_COMPLETE_DAILY_ACTIVITIES: YES
BATHING: INDEPENDENT
TOILETING: INDEPENDENT
HEARING - RIGHT EAR: FUNCTIONAL
WALKS IN HOME: INDEPENDENT
ADEQUATE_TO_COMPLETE_ADL: YES
GROOMING: INDEPENDENT
HEARING - LEFT EAR: FUNCTIONAL
DRESSING YOURSELF: INDEPENDENT
FEEDING YOURSELF: INDEPENDENT
PATIENT'S MEMORY ADEQUATE TO SAFELY COMPLETE DAILY ACTIVITIES?: YES

## 2024-12-04 ASSESSMENT — ENCOUNTER SYMPTOMS
NAUSEA: 0
VOMITING: 0
HEMATURIA: 0
BLOOD IN STOOL: 0
FATIGUE: 1
HEMATOLOGIC/LYMPHATIC NEGATIVE: 1
ABDOMINAL PAIN: 0
CHILLS: 1
FREQUENCY: 0
DIFFICULTY URINATING: 0
GASTROINTESTINAL NEGATIVE: 1
PSYCHIATRIC NEGATIVE: 1
SHORTNESS OF BREATH: 0
MUSCULOSKELETAL NEGATIVE: 1
RESPIRATORY NEGATIVE: 1
DYSURIA: 0
APPETITE CHANGE: 0
CARDIOVASCULAR NEGATIVE: 1
NEUROLOGICAL NEGATIVE: 1
EYES NEGATIVE: 1
DIARRHEA: 0
ALLERGIC/IMMUNOLOGIC NEGATIVE: 1

## 2024-12-04 ASSESSMENT — COGNITIVE AND FUNCTIONAL STATUS - GENERAL
MOBILITY SCORE: 24
PATIENT BASELINE BEDBOUND: NO
DAILY ACTIVITIY SCORE: 24

## 2024-12-04 ASSESSMENT — PAIN - FUNCTIONAL ASSESSMENT: PAIN_FUNCTIONAL_ASSESSMENT: 0-10

## 2024-12-04 ASSESSMENT — PATIENT HEALTH QUESTIONNAIRE - PHQ9
1. LITTLE INTEREST OR PLEASURE IN DOING THINGS: NOT AT ALL
2. FEELING DOWN, DEPRESSED OR HOPELESS: NOT AT ALL
SUM OF ALL RESPONSES TO PHQ9 QUESTIONS 1 & 2: 0

## 2024-12-04 ASSESSMENT — COLUMBIA-SUICIDE SEVERITY RATING SCALE - C-SSRS
1. IN THE PAST MONTH, HAVE YOU WISHED YOU WERE DEAD OR WISHED YOU COULD GO TO SLEEP AND NOT WAKE UP?: NO
6. HAVE YOU EVER DONE ANYTHING, STARTED TO DO ANYTHING, OR PREPARED TO DO ANYTHING TO END YOUR LIFE?: NO
2. HAVE YOU ACTUALLY HAD ANY THOUGHTS OF KILLING YOURSELF?: NO

## 2024-12-04 ASSESSMENT — PAIN SCALES - GENERAL: PAINLEVEL_OUTOF10: 0 - NO PAIN

## 2024-12-04 NOTE — ED PROVIDER NOTES
HPI   Chief Complaint   Patient presents with    Dizziness    abnormal labs       74-year-old female with pertinent past medical history of iron deficiency anemia who presents with a chief concern of abnormal lab work.  Patient states that her kidney function has gotten worse and her hemoglobin is lower.  She states she has been feeling more rundown and more out of breath with doing her usual activities, but otherwise has not had any black tarry stools, vomiting, diarrhea, constipation, dysuria, hematuria, hematemesis, or bleeding from any other source.  Has been taking iron supplements.  She denies any pain anywhere and has not had any recent fevers or chills or other infectious symptoms.              Patient History   Past Medical History:   Diagnosis Date    Arthritis     Body mass index (BMI) 31.0-31.9, adult 12/07/2021    BMI 31.0-31.9,adult    Body mass index (BMI) 32.0-32.9, adult 07/12/2021    Body mass index (BMI) of 32.0 to 32.9 in adult    Body mass index (BMI)30.0-30.9, adult 05/03/2022    Body mass index (BMI) of 30.0 to 30.9 in adult    Body mass index (BMI)30.0-30.9, adult 09/10/2020    BMI 30.0-30.9,adult    Cataract     Encounter for immunization 09/28/2018    Need for prophylactic vaccination and inoculation against influenza    HTN (hypertension)     Hyperlipidemia     Personal history of other drug therapy 03/11/2021    History of pneumococcal vaccination    Type 2 diabetes mellitus     Vitreous degeneration, unspecified eye     Vitreous degeneration     Past Surgical History:   Procedure Laterality Date    CHOLECYSTECTOMY      COLONOSCOPY  03/18/2013    Complete Colonoscopy    HYSTERECTOMY       Family History   Problem Relation Name Age of Onset    No Known Problems Mother      No Known Problems Father       Social History     Tobacco Use    Smoking status: Never     Passive exposure: Past    Smokeless tobacco: Never   Vaping Use    Vaping status: Never Used   Substance Use Topics    Alcohol  use: Not Currently     Comment: occ    Drug use: Not Currently       Physical Exam   ED Triage Vitals [12/04/24 1357]   Temperature Heart Rate Respirations BP   36.9 °C (98.4 °F) (!) 105 20 128/68      Pulse Ox Temp Source Heart Rate Source Patient Position   100 % Temporal Monitor Sitting      BP Location FiO2 (%)     Right arm --       Physical Exam  Vitals and nursing note reviewed. Exam conducted with a chaperone present.   Constitutional:       Appearance: She is not ill-appearing.   HENT:      Head: Atraumatic.      Nose: Nose normal.      Mouth/Throat:      Mouth: Mucous membranes are dry.      Pharynx: Oropharynx is clear.   Eyes:      Extraocular Movements: Extraocular movements intact.      Pupils: Pupils are equal, round, and reactive to light.   Cardiovascular:      Rate and Rhythm: Normal rate.      Pulses: Normal pulses.      Heart sounds: Normal heart sounds.   Pulmonary:      Effort: Pulmonary effort is normal.   Abdominal:      General: Abdomen is flat.   Genitourinary:     Rectum: Normal. Guaiac result positive.      Comments: No obvious hemorrhage or melena  Musculoskeletal:      Right lower leg: No edema.      Left lower leg: No edema.   Skin:     General: Skin is warm and dry.      Capillary Refill: Capillary refill takes 2 to 3 seconds.   Neurological:      General: No focal deficit present.      Mental Status: She is alert and oriented to person, place, and time.      Sensory: No sensory deficit.      Motor: No weakness.       EKG individual interpretation:  Normal sinus rhythm, rate 99, , , QTc 472  Normal axis.  No ST segment elevation or depression, right bundle branch block, no abnormal T wave inversions.  No change when compared to EKG on 5/15/2024    ED Course & [facility]   ED Course as of 12/04/24 2258   Wed Dec 04, 2024   1606 Patient is a 74-year-old female with pertinent past medical history of iron deficiency anemia who presents with a chief concern of abnormal lab work.   Patient states that her kidney function has gotten worse and her hemoglobin is lower.  She states she has been feeling more rundown and more out of breath with doing her usual activities, but otherwise has not had any black tarry stools, vomiting, diarrhea, constipation, dysuria, hematuria, hematemesis, or bleeding from any other source.  Has been taking iron supplements.  She denies any pain anywhere and has not had any recent fevers or chills or other infectious symptoms.    On exam, patient is resting comfortably no acute distress, sluggish capillary refill, but does not appear significantly pale, heart lungs clear to auscultation abdomen soft and nontender.    Differential diagnosis includes but elevated to worsening iron deficiency anemia, unclear why patient has a significant decrease in her renal function, could be related to her anemia.  Will rule out electrolyte disturbance, lab error, and assess for infection. [KK]   1647 HEMOGLOBIN(!): 7.7 [KK]   1805 No obvious melena on rectal exam. [KK]   1826 OCCULT BLOOD, STOOL X1(!): Positive [KK]   1900 CT abdomen pelvis wo IV contrast  IMPRESSION:  1. No evidence of acute abnormality in the abdomen or pelvis.  2. Hypodense left renal lesions measuring 3.6 cm and 2 cm may  represent renal cysts, but are incompletely characterized without  contrast. These may be further assessed with nonemergent renal  ultrasound.  3. Prior cholecystectomy.  4. Scattered colonic diverticulosis.       [KK]      ED Course User Index  [KK] Hao Chang DO         Diagnoses as of 12/04/24 5369   VICKI (acute kidney injury) (CMS-[facility])   Gastrointestinal hemorrhage, unspecified gastrointestinal hemorrhage type   Anemia, unspecified type   Renal cyst                 No data recorded     Kyle Coma Scale Score: 15 (12/04/24 1553 : Nighat Davalos, RN)                           Medical Decision Making  Patient presented with abnormal labs, informed tress test that she was dizzy at some point  but did not complain of dizziness to me.  Workup and evaluation revealing anemia and an VICKI.  Patient has no specific etiology or complaints consistent with GI losses causing an VICKI and does not appear to be hemorrhaging actively from her GI system with no evidence of black tarry stools that she can recall.  Was provided with Protonix, CT abdomen pelvis completed to rule out intra-abdominal pathology which showed some renal lesions but no other concerning findings.  Will need greater than 2 midnights for further workup and evaluation.  Discussed this with admitting physician who agreed with current management accept the patient for admission.  Patient/surrogate agreeable with plan and disposition.        Procedure  Procedures     Hao Chang, DO  12/04/24 4765

## 2024-12-04 NOTE — ED TRIAGE NOTES
Pt states she has lab work completed today and her hemoglobin was low and her kidney function was elevated. Pt states she was having dizziness, fatigue and cold hands.

## 2024-12-05 ENCOUNTER — APPOINTMENT (OUTPATIENT)
Dept: RADIOLOGY | Facility: CLINIC | Age: 74
End: 2024-12-05
Payer: MEDICARE

## 2024-12-05 LAB
ALBUMIN SERPL BCP-MCNC: 3.4 G/DL (ref 3.4–5)
ANION GAP SERPL CALC-SCNC: 9 MMOL/L (ref 10–20)
BUN SERPL-MCNC: 25 MG/DL (ref 6–23)
C3 SERPL-MCNC: 107 MG/DL (ref 87–200)
C4 SERPL-MCNC: 19 MG/DL (ref 10–50)
CALCIUM SERPL-MCNC: 8.2 MG/DL (ref 8.6–10.3)
CHLORIDE SERPL-SCNC: 107 MMOL/L (ref 98–107)
CO2 SERPL-SCNC: 26 MMOL/L (ref 21–32)
CREAT SERPL-MCNC: 1.82 MG/DL (ref 0.5–1.05)
EGFRCR SERPLBLD CKD-EPI 2021: 29 ML/MIN/1.73M*2
EST. AVERAGE GLUCOSE BLD GHB EST-MCNC: 120 MG/DL
FOLATE SERPL-MCNC: 23.1 NG/ML
GLUCOSE BLD MANUAL STRIP-MCNC: 105 MG/DL (ref 74–99)
GLUCOSE BLD MANUAL STRIP-MCNC: 115 MG/DL (ref 74–99)
GLUCOSE BLD MANUAL STRIP-MCNC: 82 MG/DL (ref 74–99)
GLUCOSE SERPL-MCNC: 106 MG/DL (ref 74–99)
HAPTOGLOB SERPL NEPH-MCNC: 171 MG/DL (ref 30–200)
HBA1C MFR BLD: 5.8 %
HOLD SPECIMEN: NORMAL
IRON SATN MFR SERPL: 13 % (ref 25–45)
IRON SERPL-MCNC: 39 UG/DL (ref 35–150)
PHOSPHATE SERPL-MCNC: 3.7 MG/DL (ref 2.5–4.9)
POTASSIUM SERPL-SCNC: 3.2 MMOL/L (ref 3.5–5.3)
PROT SERPL-MCNC: 5.7 G/DL (ref 6.4–8.2)
SODIUM SERPL-SCNC: 139 MMOL/L (ref 136–145)
TIBC SERPL-MCNC: 298 UG/DL (ref 240–445)
TSH SERPL-ACNC: 2.15 MIU/L (ref 0.44–3.98)
UIBC SERPL-MCNC: 259 UG/DL (ref 110–370)
VIT B12 SERPL-MCNC: 259 PG/ML (ref 211–911)

## 2024-12-05 PROCEDURE — 1100000001 HC PRIVATE ROOM DAILY

## 2024-12-05 PROCEDURE — 2500000004 HC RX 250 GENERAL PHARMACY W/ HCPCS (ALT 636 FOR OP/ED): Performed by: INTERNAL MEDICINE

## 2024-12-05 PROCEDURE — 86036 ANCA SCREEN EACH ANTIBODY: CPT | Performed by: INTERNAL MEDICINE

## 2024-12-05 PROCEDURE — 99222 1ST HOSP IP/OBS MODERATE 55: CPT | Performed by: INTERNAL MEDICINE

## 2024-12-05 PROCEDURE — 86235 NUCLEAR ANTIGEN ANTIBODY: CPT | Performed by: INTERNAL MEDICINE

## 2024-12-05 PROCEDURE — 83010 ASSAY OF HAPTOGLOBIN QUANT: CPT | Mod: AHULAB | Performed by: INTERNAL MEDICINE

## 2024-12-05 PROCEDURE — 86038 ANTINUCLEAR ANTIBODIES: CPT | Mod: AHULAB | Performed by: INTERNAL MEDICINE

## 2024-12-05 PROCEDURE — 82947 ASSAY GLUCOSE BLOOD QUANT: CPT

## 2024-12-05 PROCEDURE — 84100 ASSAY OF PHOSPHORUS: CPT | Performed by: INTERNAL MEDICINE

## 2024-12-05 PROCEDURE — 86334 IMMUNOFIX E-PHORESIS SERUM: CPT | Mod: AHULAB | Performed by: INTERNAL MEDICINE

## 2024-12-05 PROCEDURE — 36415 COLL VENOUS BLD VENIPUNCTURE: CPT | Performed by: INTERNAL MEDICINE

## 2024-12-05 PROCEDURE — 2500000001 HC RX 250 WO HCPCS SELF ADMINISTERED DRUGS (ALT 637 FOR MEDICARE OP): Performed by: INTERNAL MEDICINE

## 2024-12-05 PROCEDURE — 36415 COLL VENOUS BLD VENIPUNCTURE: CPT | Performed by: HOSPITALIST

## 2024-12-05 PROCEDURE — 2500000002 HC RX 250 W HCPCS SELF ADMINISTERED DRUGS (ALT 637 FOR MEDICARE OP, ALT 636 FOR OP/ED): Performed by: HOSPITALIST

## 2024-12-05 PROCEDURE — 84155 ASSAY OF PROTEIN SERUM: CPT | Mod: AHULAB | Performed by: INTERNAL MEDICINE

## 2024-12-05 PROCEDURE — 83521 IG LIGHT CHAINS FREE EACH: CPT | Mod: AHULAB | Performed by: INTERNAL MEDICINE

## 2024-12-05 PROCEDURE — 86160 COMPLEMENT ANTIGEN: CPT | Mod: AHULAB | Performed by: INTERNAL MEDICINE

## 2024-12-05 PROCEDURE — 83540 ASSAY OF IRON: CPT | Performed by: HOSPITALIST

## 2024-12-05 PROCEDURE — 76770 US EXAM ABDO BACK WALL COMP: CPT | Performed by: RADIOLOGY

## 2024-12-05 PROCEDURE — 83550 IRON BINDING TEST: CPT | Performed by: HOSPITALIST

## 2024-12-05 PROCEDURE — 2500000001 HC RX 250 WO HCPCS SELF ADMINISTERED DRUGS (ALT 637 FOR MEDICARE OP): Performed by: HOSPITALIST

## 2024-12-05 PROCEDURE — 84443 ASSAY THYROID STIM HORMONE: CPT | Performed by: HOSPITALIST

## 2024-12-05 PROCEDURE — 99233 SBSQ HOSP IP/OBS HIGH 50: CPT | Performed by: INTERNAL MEDICINE

## 2024-12-05 PROCEDURE — 2500000004 HC RX 250 GENERAL PHARMACY W/ HCPCS (ALT 636 FOR OP/ED): Performed by: HOSPITALIST

## 2024-12-05 PROCEDURE — 80069 RENAL FUNCTION PANEL: CPT | Performed by: INTERNAL MEDICINE

## 2024-12-05 PROCEDURE — 99222 1ST HOSP IP/OBS MODERATE 55: CPT | Performed by: NURSE PRACTITIONER

## 2024-12-05 RX ORDER — DIPHENHYDRAMINE HYDROCHLORIDE 50 MG/ML
50 INJECTION INTRAMUSCULAR; INTRAVENOUS EVERY 5 MIN PRN
Status: DISCONTINUED | OUTPATIENT
Start: 2024-12-05 | End: 2024-12-07 | Stop reason: HOSPADM

## 2024-12-05 RX ORDER — PNV NO.95/FERROUS FUM/FOLIC AC 28MG-0.8MG
100 TABLET ORAL DAILY
Status: DISCONTINUED | OUTPATIENT
Start: 2024-12-05 | End: 2024-12-07 | Stop reason: HOSPADM

## 2024-12-05 RX ORDER — PANTOPRAZOLE SODIUM 40 MG/10ML
40 INJECTION, POWDER, LYOPHILIZED, FOR SOLUTION INTRAVENOUS DAILY
Status: DISCONTINUED | OUTPATIENT
Start: 2024-12-05 | End: 2024-12-07 | Stop reason: HOSPADM

## 2024-12-05 SDOH — ECONOMIC STABILITY: HOUSING INSECURITY: IN THE PAST 12 MONTHS, HOW MANY TIMES HAVE YOU MOVED WHERE YOU WERE LIVING?: 0

## 2024-12-05 SDOH — HEALTH STABILITY: MENTAL HEALTH: HOW OFTEN DO YOU HAVE SIX OR MORE DRINKS ON ONE OCCASION?: NEVER

## 2024-12-05 SDOH — ECONOMIC STABILITY: FOOD INSECURITY: WITHIN THE PAST 12 MONTHS, THE FOOD YOU BOUGHT JUST DIDN'T LAST AND YOU DIDN'T HAVE MONEY TO GET MORE.: NEVER TRUE

## 2024-12-05 SDOH — ECONOMIC STABILITY: FOOD INSECURITY: WITHIN THE PAST 12 MONTHS, YOU WORRIED THAT YOUR FOOD WOULD RUN OUT BEFORE YOU GOT THE MONEY TO BUY MORE.: NEVER TRUE

## 2024-12-05 SDOH — ECONOMIC STABILITY: HOUSING INSECURITY: AT ANY TIME IN THE PAST 12 MONTHS, WERE YOU HOMELESS OR LIVING IN A SHELTER (INCLUDING NOW)?: NO

## 2024-12-05 SDOH — ECONOMIC STABILITY: FOOD INSECURITY: HOW HARD IS IT FOR YOU TO PAY FOR THE VERY BASICS LIKE FOOD, HOUSING, MEDICAL CARE, AND HEATING?: NOT HARD AT ALL

## 2024-12-05 SDOH — ECONOMIC STABILITY: INCOME INSECURITY: IN THE PAST 12 MONTHS HAS THE ELECTRIC, GAS, OIL, OR WATER COMPANY THREATENED TO SHUT OFF SERVICES IN YOUR HOME?: NO

## 2024-12-05 SDOH — HEALTH STABILITY: MENTAL HEALTH: HOW MANY DRINKS CONTAINING ALCOHOL DO YOU HAVE ON A TYPICAL DAY WHEN YOU ARE DRINKING?: 1 OR 2

## 2024-12-05 SDOH — ECONOMIC STABILITY: HOUSING INSECURITY: IN THE LAST 12 MONTHS, WAS THERE A TIME WHEN YOU WERE NOT ABLE TO PAY THE MORTGAGE OR RENT ON TIME?: NO

## 2024-12-05 SDOH — ECONOMIC STABILITY: TRANSPORTATION INSECURITY: IN THE PAST 12 MONTHS, HAS LACK OF TRANSPORTATION KEPT YOU FROM MEDICAL APPOINTMENTS OR FROM GETTING MEDICATIONS?: NO

## 2024-12-05 SDOH — HEALTH STABILITY: MENTAL HEALTH: HOW OFTEN DO YOU HAVE A DRINK CONTAINING ALCOHOL?: MONTHLY OR LESS

## 2024-12-05 ASSESSMENT — COGNITIVE AND FUNCTIONAL STATUS - GENERAL
DAILY ACTIVITIY SCORE: 24
MOBILITY SCORE: 24
MOBILITY SCORE: 24
DAILY ACTIVITIY SCORE: 24

## 2024-12-05 ASSESSMENT — ACTIVITIES OF DAILY LIVING (ADL)
LACK_OF_TRANSPORTATION: NO
LACK_OF_TRANSPORTATION: NO

## 2024-12-05 ASSESSMENT — LIFESTYLE VARIABLES
AUDIT-C TOTAL SCORE: 1
SKIP TO QUESTIONS 9-10: 1

## 2024-12-05 ASSESSMENT — ENCOUNTER SYMPTOMS
BLOOD IN STOOL: 0
SHORTNESS OF BREATH: 0
UNEXPECTED WEIGHT CHANGE: 1
WEAKNESS: 1
ENDOCRINE COMMENTS: AS ABOVE

## 2024-12-05 ASSESSMENT — PAIN SCALES - GENERAL
PAINLEVEL_OUTOF10: 0 - NO PAIN
PAINLEVEL_OUTOF10: 0 - NO PAIN

## 2024-12-05 ASSESSMENT — PAIN - FUNCTIONAL ASSESSMENT: PAIN_FUNCTIONAL_ASSESSMENT: 0-10

## 2024-12-05 NOTE — CONSULTS
Reason For Consult  Anemia, FOBT +    History Of Present Illness  Monica Trotter is a 74 y.o. female with history of diabetes, hyperlipidemia, iron deficiency anemia, presented with worsening of anemia found on routine labs.  She found with hemoglobin 7.3 which is down from 10.1 since 02/2024.  No reported melena, hematochezia, hematemesis, hematuria.  She complains of fatigue and being cold, eating a lot of ice in the past 2 to 3 weeks, overall feeling more weak and last week or so.  Labs on admission showed worsening of renal function, BUN 34, creatinine 2.17, normal liver enzymes, iron 97, TIBC 420, saturation 23%, ferritin 20, H&H 7.3 and 23.6, MCV 87, platelets 265.  She is not on any anticoagulation.  She reports taking ibuprofen 2 to 4 tablets a day for the past 3 months for hip and back pain.  Not on PPI.  She denies indigestion, heartburn, acid reflux, dysphagia or odynophagia.  Reports belching, more frequently than usual.  Moving bowels daily.  No weight loss or weight gain.    EGD 4/19/2022 for iron deficiency anemia  - Z-line, 39 cm from the incisors.                         - Widely patent Schatzki ring.                         - Small hiatal hernia.                         - Erythematous mucosa in the antrum. Biopsied.                         - Normal duodenal bulb and second portion of the                          duodenum. Biopsied.  Colonoscopy 4/19/2022 for iron deficiency anemia  - Preparation of the colon was fair.                         - The colon was redundant with significant looping                          during procedure.                         - One diminutive polyp in the ascending colon, removed                          with a cold biopsy forceps. Resected and retrieved.                         - Diverticulosis in the entire examined colon.                         - External small hemorrhoids.                         - The examination was otherwise normal.  A.  DUODENUM, BULB, SECOND  PART, BIOPSY:    --DUODENAL MUCOSA WITH NO SIGNIFICANT PATHOLOGICAL FINDINGS.   --VILLI WITH NORMAL SIZES AND SHAPES.   B.  STOMACH, BODY, ANTRUM, BIOPSY:   --GASTRIC ANTRAL AND OXYNTIC MUCOSA WITH FOCAL MILD CHRONIC INFLAMMATION.   --NO HELICOBACTER PYLORI-LIKE ORGANISMS SEEN IN ROUTINE STAINED SECTIONS.   C.  ASCENDING COLON POLYP, COLD BIOPSY:    --FRAGMENTS OF TUBULAR ADENOMA.     Past Medical History  She has a past medical history of Arthritis, Body mass index (BMI) 31.0-31.9, adult (12/07/2021), Body mass index (BMI) 32.0-32.9, adult (07/12/2021), Body mass index (BMI)30.0-30.9, adult (05/03/2022), Body mass index (BMI)30.0-30.9, adult (09/10/2020), Cataract, Encounter for immunization (09/28/2018), HTN (hypertension), Hyperlipidemia, Personal history of other drug therapy (03/11/2021), Type 2 diabetes mellitus, and Vitreous degeneration, unspecified eye.    Surgical History  She has a past surgical history that includes Colonoscopy (03/18/2013); Hysterectomy; and Cholecystectomy.     Social History  She reports that she has never smoked. She has been exposed to tobacco smoke. She has never used smokeless tobacco. She reports that she does not currently use alcohol. She reports that she does not currently use drugs.    Family History  Family History   Problem Relation Name Age of Onset    No Known Problems Mother      No Known Problems Father     Father had colon cancer    Allergies  Sulfur and Sulfa (sulfonamide antibiotics)    Review of Systems  10 systems reviewed and negative other than HPI     Physical Exam  Physical Exam  Vitals reviewed.   Constitutional:       Appearance: Normal appearance.   HENT:      Head: Normocephalic and atraumatic.      Nose: Nose normal.      Mouth/Throat:      Pharynx: Oropharynx is clear.   Eyes:      Conjunctiva/sclera: Conjunctivae normal.   Cardiovascular:      Rate and Rhythm: Normal rate and regular rhythm.      Pulses: Normal pulses.      Heart sounds: Normal heart  "sounds.   Pulmonary:      Effort: Pulmonary effort is normal.      Breath sounds: Normal breath sounds.   Abdominal:      General: Bowel sounds are normal. There is no distension.      Palpations: Abdomen is soft.      Tenderness: There is no abdominal tenderness. There is no guarding.   Skin:     General: Skin is warm and dry.   Neurological:      General: No focal deficit present.      Mental Status: She is alert and oriented to person, place, and time.   Psychiatric:         Mood and Affect: Mood normal.         Behavior: Behavior normal.            Last Recorded Vitals  Blood pressure 126/54, pulse 86, temperature 36.9 °C (98.4 °F), temperature source Temporal, resp. rate 18, height 1.702 m (5' 7\"), weight 64.9 kg (143 lb), SpO2 100%.    Relevant Results      Scheduled medications  amitriptyline, 25 mg, oral, Nightly  cefTRIAXone, 1 g, intravenous, q24h CHELITA  [Held by provider] chlorthalidone, 25 mg, oral, Daily  cholecalciferol, 5,000 Units, oral, Daily  cyanocobalamin, 100 mcg, oral, Daily  enoxaparin, 30 mg, subcutaneous, q24h CHELITA  ferrous sulfate (325 mg ferrous sulfate), 65 mg of iron, oral, BID  insulin lispro, 0-10 Units, subcutaneous, TID AC  [Held by provider] lisinopril, 10 mg, oral, Daily  polyethylene glycol, 17 g, oral, Daily  rosuvastatin, 10 mg, oral, Daily      Continuous medications  sodium chloride 0.9%, 100 mL/hr, Last Rate: 100 mL/hr (12/05/24 1016)      PRN medications  PRN medications: acetaminophen, acetaminophen, cyclobenzaprine, dextrose, dextrose, glucagon, glucagon, melatonin, ondansetron ODT **OR** ondansetron  ECG 12 lead    Result Date: 12/5/2024  Sinus rhythm with occasional Premature ventricular complexes Right bundle branch block Abnormal ECG When compared with ECG of 15-MAY-2024 11:51, Premature ventricular complexes are now Present Premature atrial complexes are no longer Present    US renal complete    Result Date: 12/5/2024  Interpreted By:  Shawn Phan, STUDY: US RENAL " COMPLETE;  12/5/2024 12:35 am   INDICATION: Signs/Symptoms:VICKI.   COMPARISON: 1/9/2023   ACCESSION NUMBER(S): [id number]   ORDERING CLINICIAN: MAITE ALTAMIRANO   TECHNIQUE: Multiple sonographic grayscale and color images of the kidneys were performed.   FINDINGS: The right kidney measures 10.2 cm in length. The left kidney measures 9.9 cm in length. There are left renal cysts with largest measuring 3.6 cm and 1.9 cm. No significant left or right hydronephrosis.   Urinary bladder is underdistended and not well evaluated.       No significant hydronephrosis.   Left renal cysts.   MACRO: None   Signed by: Shawn Phan 12/5/2024 1:05 AM Dictation workstation:   [id number]    CT abdomen pelvis wo IV contrast    Result Date: 12/4/2024  Interpreted By:  Violette Britton, STUDY: CT ABDOMEN PELVIS WO IV CONTRAST;  12/4/2024 6:34 pm   INDICATION: Signs/Symptoms:eval kidneys for worsening kidney function.     COMPARISON: CT pulmonary angiogram 02/01/2022.   ACCESSION NUMBER(S): [id number]   ORDERING CLINICIAN: PABLO LUQUE   TECHNIQUE: CT of the abdomen and pelvis was performed. Contiguous axial images were obtained at 3 mm slice thickness through the abdomen and pelvis. Coronal and sagittal reconstructions at 3 mm slice thickness were performed.  No intravenous contrast was administered; positive oral contrast was given.   FINDINGS: Please note that the evaluation of vessels, lymph nodes and organs is limited without intravenous contrast.   LOWER CHEST: Mild patchy linear subsegmental atelectasis in the lung bases.   ABDOMEN:   LIVER: Small hypodense lesion consistent with hepatic cysts in the right liver lobe segment 5 has decreased in size to 1.1 cm from 1.4 cm since 2022. Subcentimeter hypodense probable hepatic cyst noted in the superior left liver lobe.   BILE DUCTS: The intrahepatic and extrahepatic ducts are not dilated.   GALLBLADDER: Prior cholecystectomy.   PANCREAS: Unenhanced pancreas is unremarkable.   SPLEEN:  Normal spleen size without focal lesion.   ADRENAL GLANDS: Within normal limits.   KIDNEYS AND URETERS: Hypodense lesions measuring fluid attenuation measure 3.6 cm in the anterior left mid kidney and 2 cm in the anterior left upper-mid kidney junction. These are incompletely characterized without contrast, but likely represent renal cysts. Unenhanced kidneys are otherwise unremarkable. No renal stone or hydronephrosis. No perinephric inflammatory changes.   PELVIS:   BLADDER: Well-distended urinary bladder is unremarkable.   REPRODUCTIVE ORGANS: Prior hysterectomy.   BOWEL: Stomach, small bowel loops and colon are normal in caliber without wall thickening or adjacent inflammatory changes. Scattered colonic diverticulosis noted without inflammatory changes of diverticulitis. Appendix is normal.   VESSELS: Abdominal aorta is normal in caliber with moderate atherosclerotic calcifications.   PERITONEUM/RETROPERITONEUM/LYMPH NODES: No ascites or free air, no fluid collection.  No abdominopelvic lymphadenopathy is present.   ABDOMINAL WALL: Mild diffuse abdominal wall subcutaneous edema.   BONES: No acute bony abnormality or suspicious bone lesion. Mild multilevel thoracic spine degenerative disc disease.       1. No evidence of acute abnormality in the abdomen or pelvis. 2. Hypodense left renal lesions measuring 3.6 cm and 2 cm may represent renal cysts, but are incompletely characterized without contrast. These may be further assessed with nonemergent renal ultrasound. 3. Prior cholecystectomy. 4. Scattered colonic diverticulosis.     MACRO: None   Signed by: Violette Britton 12/4/2024 6:59 PM Dictation workstation:   [id number]   Results for orders placed or performed during the hospital encounter of 12/04/24 (from the past 24 hours)   ECG 12 lead   Result Value Ref Range    Ventricular Rate 99 BPM    Atrial Rate 99 BPM    NC Interval 118 ms    QRS Duration 122 ms    QT Interval 368 ms    QTC Calculation(Bazett) 472 ms     P Axis 69 degrees    R Axis 54 degrees    T Axis 34 degrees    QRS Count 16 beats    Q Onset 224 ms    P Onset 165 ms    P Offset 217 ms    T Offset 408 ms    QTC Fredericia 434 ms   CBC and Auto Differential   Result Value Ref Range    WBC 4.6 4.4 - 11.3 x10*3/uL    nRBC 0.0 0.0 - 0.0 /100 WBCs    RBC 2.83 (L) 4.00 - 5.20 x10*6/uL    Hemoglobin 7.7 (L) 12.0 - 16.0 g/dL    Hematocrit 24.6 (L) 36.0 - 46.0 %    MCV 87 80 - 100 fL    MCH 27.2 26.0 - 34.0 pg    MCHC 31.3 (L) 32.0 - 36.0 g/dL    RDW 13.5 11.5 - 14.5 %    Platelets 289 150 - 450 x10*3/uL    Neutrophils % 46.3 40.0 - 80.0 %    Immature Granulocytes %, Automated 0.2 0.0 - 0.9 %    Lymphocytes % 39.4 13.0 - 44.0 %    Monocytes % 10.9 2.0 - 10.0 %    Eosinophils % 1.7 0.0 - 6.0 %    Basophils % 1.5 0.0 - 2.0 %    Neutrophils Absolute 2.12 1.60 - 5.50 x10*3/uL    Immature Granulocytes Absolute, Automated 0.01 0.00 - 0.50 x10*3/uL    Lymphocytes Absolute 1.81 0.80 - 3.00 x10*3/uL    Monocytes Absolute 0.50 0.05 - 0.80 x10*3/uL    Eosinophils Absolute 0.08 0.00 - 0.40 x10*3/uL    Basophils Absolute 0.07 0.00 - 0.10 x10*3/uL   Comprehensive metabolic panel   Result Value Ref Range    Glucose 93 74 - 99 mg/dL    Sodium 138 136 - 145 mmol/L    Potassium 3.4 (L) 3.5 - 5.3 mmol/L    Chloride 103 98 - 107 mmol/L    Bicarbonate 25 21 - 32 mmol/L    Anion Gap 13 10 - 20 mmol/L    Urea Nitrogen 34 (H) 6 - 23 mg/dL    Creatinine 2.17 (H) 0.50 - 1.05 mg/dL    eGFR 23 (L) >60 mL/min/1.73m*2    Calcium 8.7 8.6 - 10.3 mg/dL    Albumin 4.2 3.4 - 5.0 g/dL    Alkaline Phosphatase 63 33 - 136 U/L    Total Protein 7.1 6.4 - 8.2 g/dL    AST 31 9 - 39 U/L    Bilirubin, Total 0.3 0.0 - 1.2 mg/dL    ALT 26 7 - 45 U/L   Protime-INR   Result Value Ref Range    Protime 12.7 9.8 - 12.8 seconds    INR 1.1 0.9 - 1.1   Type And Screen   Result Value Ref Range    ABO TYPE O     Rh TYPE POS     ANTIBODY SCREEN NEG    Urinalysis with Reflex Culture and Microscopic   Result Value Ref Range     Color, Urine Light-Yellow Light-Yellow, Yellow, Dark-Yellow    Appearance, Urine Clear Clear    Specific Gravity, Urine 1.015 1.005 - 1.035    pH, Urine 5.5 5.0, 5.5, 6.0, 6.5, 7.0, 7.5, 8.0    Protein, Urine NEGATIVE NEGATIVE, 10 (TRACE), 20 (TRACE) mg/dL    Glucose, Urine Normal Normal mg/dL    Blood, Urine NEGATIVE NEGATIVE    Ketones, Urine 60 (2+) (A) NEGATIVE mg/dL    Bilirubin, Urine NEGATIVE NEGATIVE    Urobilinogen, Urine Normal Normal mg/dL    Nitrite, Urine NEGATIVE NEGATIVE    Leukocyte Esterase, Urine 25 Mani/µL (A) NEGATIVE   Microscopic Only, Urine   Result Value Ref Range    WBC, Urine 1-5 1-5, NONE /HPF    RBC, Urine 1-2 NONE, 1-2, 3-5 /HPF    Squamous Epithelial Cells, Urine 1-9 (SPARSE) Reference range not established. /HPF   Lactate Dehydrogenase   Result Value Ref Range     84 - 246 U/L   Reticulocytes   Result Value Ref Range    Retic % 1.2 0.5 - 2.0 %    Retic Absolute 0.033 0.017 - 0.110 x10*6/uL    Reticulocyte Hemoglobin 31 28 - 38 pg    Immature Retic fraction 15.7 <=16.0 %   Occult Blood, Stool    Specimen: Stool   Result Value Ref Range    Occult Blood, Stool X1 Positive (A) Negative   Lavender Top   Result Value Ref Range    Extra Tube Hold for add-ons.    Iron and TIBC   Result Value Ref Range    Iron 39 35 - 150 ug/dL    UIBC 259 110 - 370 ug/dL    TIBC 298 240 - 445 ug/dL    % Saturation 13 (L) 25 - 45 %   TSH   Result Value Ref Range    Thyroid Stimulating Hormone 2.15 0.44 - 3.98 mIU/L   POCT GLUCOSE   Result Value Ref Range    POCT Glucose 105 (H) 74 - 99 mg/dL   Renal function panel   Result Value Ref Range    Glucose 106 (H) 74 - 99 mg/dL    Sodium 139 136 - 145 mmol/L    Potassium 3.2 (L) 3.5 - 5.3 mmol/L    Chloride 107 98 - 107 mmol/L    Bicarbonate 26 21 - 32 mmol/L    Anion Gap 9 (L) 10 - 20 mmol/L    Urea Nitrogen 25 (H) 6 - 23 mg/dL    Creatinine 1.82 (H) 0.50 - 1.05 mg/dL    eGFR 29 (L) >60 mL/min/1.73m*2    Calcium 8.2 (L) 8.6 - 10.3 mg/dL    Phosphorus 3.7 2.5 -  4.9 mg/dL    Albumin 3.4 3.4 - 5.0 g/dL          Assessment/Plan     74 y.o. female with history of diabetes, hyperlipidemia, iron deficiency anemia, presented with worsening of anemia, 2 to 3-week history of progressive fatigue, lightheadedness, mild indigestion.  She found with acute kidney injury.  He anemia is normocytic, baseline hemoglobin around 10.  H&H today 7.7 and 24.6.  Patient with history of chronic NSAID use for back and hip pain.  Acute on chronic anemia in the setting of chronic NSAID use, suspect multifactorial anemia could be chronic slow GI blood loss from PUD, small bowel ulceration, AVM, less likely malignancy; anemia of chronic disease and CKD.    -Continue to monitor H&H, transfuse as needed  -Recommend daily PPI  -Plan for upper endoscopy tomorrow  -N.p.o. after midnight  -Patient strongly advised to discontinue any NSAID use  -Nephrology follows        JAMISON Becerril-CNP

## 2024-12-05 NOTE — CONSULTS
CONSULT: NEPHROLOGY SERVICE    REASON FOR CONSULT: VICKI  Admit Date: 12/4/2024  3:09 PM       HPI: Patient is a 74 y.o. female admitted 12/4/2024 with h/o CKD stage 3a, T2D, hyperlipidemia, iron deficiency anemia, presented with worsening of anemia found on routine labs.  She found with hemoglobin 7.3. she is just felling weaker and dizzy, lost wt this year on ozempic, also on lisinopril and metformin.   Consulted because of elevated Scr 2.1 compared to 1.2 on feb/2024, better this am, had ivf  Pt takes 2-4 motrin a day for the last 1-2 months or more because of hip pain, no hypotension  No rash/fever/eosinophilia, no obstruction by US, bland UA w/o protein, recent     Past Medical History:   Diagnosis Date    Arthritis     Body mass index (BMI) 31.0-31.9, adult 12/07/2021    BMI 31.0-31.9,adult    Body mass index (BMI) 32.0-32.9, adult 07/12/2021    Body mass index (BMI) of 32.0 to 32.9 in adult    Body mass index (BMI)30.0-30.9, adult 05/03/2022    Body mass index (BMI) of 30.0 to 30.9 in adult    Body mass index (BMI)30.0-30.9, adult 09/10/2020    BMI 30.0-30.9,adult    Cataract     Encounter for immunization 09/28/2018    Need for prophylactic vaccination and inoculation against influenza    HTN (hypertension)     Hyperlipidemia     Personal history of other drug therapy 03/11/2021    History of pneumococcal vaccination    Type 2 diabetes mellitus     Vitreous degeneration, unspecified eye     Vitreous degeneration     Allergies: Sulfur and Sulfa (sulfonamide antibiotics)     Past Surgical History:   Procedure Laterality Date    CHOLECYSTECTOMY      COLONOSCOPY  03/18/2013    Complete Colonoscopy    HYSTERECTOMY         Family History   Problem Relation Name Age of Onset    No Known Problems Mother      No Known Problems Father         Social History  She reports that she has never smoked. She has been exposed to tobacco smoke. She has never used smokeless tobacco. She reports that she does not currently  use alcohol. She reports that she does not currently use drugs.    Review of Systems  As above     CURRENT HOSP MEDS:    Current Facility-Administered Medications:     acetaminophen (Tylenol) tablet 650 mg, 650 mg, oral, q4h PRN, Madiha Knapp MD    acetaminophen (Tylenol) tablet 650 mg, 650 mg, oral, q4h PRN, Madiha Knapp MD    amitriptyline (Elavil) tablet 25 mg, 25 mg, oral, Nightly, Madiha Knapp MD, 25 mg at 12/05/24 0013    cefTRIAXone (Rocephin) 1 g in dextrose (iso) IV 50 mL, 1 g, intravenous, q24h [facility], Madiha Knapp MD, Stopped at 12/05/24 0053    [Held by provider] chlorthalidone (Hygroton) tablet 25 mg, 25 mg, oral, Daily, Madiha Knapp MD    cholecalciferol (Vitamin D-3) tablet 5,000 Units, 5,000 Units, oral, Daily, Madiha Knapp MD, 5,000 Units at 12/05/24 1008    cyanocobalamin (Vitamin B-12) tablet 100 mcg, 100 mcg, oral, Daily, Sorin Barker MD, 100 mcg at 12/05/24 1016    cyclobenzaprine (Flexeril) tablet 5 mg, 5 mg, oral, Nightly PRN, Madiha Knapp MD    dextrose 50 % injection 12.5 g, 12.5 g, intravenous, q15 min PRN, Madiha Knapp MD    dextrose 50 % injection 25 g, 25 g, intravenous, q15 min PRN, Madiha Knapp MD    enoxaparin (Lovenox) syringe 30 mg, 30 mg, subcutaneous, q24h [facility]HEL[facility], Madiha Knapp MD, 30 mg at 12/05/24 1008    ferrous sulfate (325 mg ferrous sulfate) tablet 325 mg, 65 mg of iron, oral, BID, Madiha Knapp MD, 325 mg at 12/05/24 1008    glucagon (Glucagen) injection 1 mg, 1 mg, intramuscular, q15 min PRN, Madiha Knapp MD    glucagon (Glucagen) injection 1 mg, 1 mg, intramuscular, q15 min PRN, Madiha Knapp MD    insulin lispro injection 0-10 Units, 0-10 Units, subcutaneous, TID AC, Madiha Knapp MD    [Held by provider] lisinopril tablet 10 mg, 10 mg, oral, Daily, Madiha Knapp MD    melatonin tablet 3 mg, 3 mg, oral, Nightly PRN, Madiha Knapp MD    ondansetron ODT (Zofran-ODT) disintegrating tablet 4 mg, 4 mg, oral, q8h PRN **OR** ondansetron (Zofran)  "injection 4 mg, 4 mg, intravenous, q8h PRN, Madiha Knapp MD    polyethylene glycol (Glycolax, Miralax) packet 17 g, 17 g, oral, Daily, Madiha Knapp MD, 17 g at 12/05/24 1008    rosuvastatin (Crestor) tablet 10 mg, 10 mg, oral, Daily, Madiha Knapp MD, 10 mg at 12/05/24 1008    sodium chloride 0.9% infusion, 100 mL/hr, intravenous, Continuous, Madiha Knapp MD, Last Rate: 100 mL/hr at 12/05/24 1016, 100 mL/hr at 12/05/24 1016     PHYSICAL EXAM:  /54   Pulse 86   Temp 36.9 °C (98.4 °F) (Temporal)   Resp 18   Ht 1.702 m (5' 7\")   Wt 64.9 kg (143 lb)   SpO2 100%   BMI 22.40 kg/m²     Intake/Output Summary (Last 24 hours) at 12/5/2024 1358  Last data filed at 12/5/2024 1217  Gross per 24 hour   Intake 1000 ml   Output 800 ml   Net 200 ml     Gen: AAO, NAD  Neck: No JVD  Cardiac: RRR  Resp: clear BS  Abd: Soft, non tender, +BS, non distended   Ext: No edema   Neuro: moves 4 ext  Peripheral Pulses: Capillary refill <2secs, strong peripheral pulses.  Skin: Skin color, texture, turgor normal, no suspicious rashes or lesions.    LABS:   Results for orders placed or performed during the hospital encounter of 12/04/24 (from the past 24 hours)   ECG 12 lead   Result Value Ref Range    Ventricular Rate 99 BPM    Atrial Rate 99 BPM    UT Interval 118 ms    QRS Duration 122 ms    QT Interval 368 ms    QTC Calculation(Bazett) 472 ms    P Axis 69 degrees    R Axis 54 degrees    T Axis 34 degrees    QRS Count 16 beats    Q Onset 224 ms    P Onset 165 ms    P Offset 217 ms    T Offset 408 ms    QTC Fredericia 434 ms   CBC and Auto Differential   Result Value Ref Range    WBC 4.6 4.4 - 11.3 x10*3/uL    nRBC 0.0 0.0 - 0.0 /100 WBCs    RBC 2.83 (L) 4.00 - 5.20 x10*6/uL    Hemoglobin 7.7 (L) 12.0 - 16.0 g/dL    Hematocrit 24.6 (L) 36.0 - 46.0 %    MCV 87 80 - 100 fL    MCH 27.2 26.0 - 34.0 pg    MCHC 31.3 (L) 32.0 - 36.0 g/dL    RDW 13.5 11.5 - 14.5 %    Platelets 289 150 - 450 x10*3/uL    Neutrophils % 46.3 40.0 - 80.0 " %    Immature Granulocytes %, Automated 0.2 0.0 - 0.9 %    Lymphocytes % 39.4 13.0 - 44.0 %    Monocytes % 10.9 2.0 - 10.0 %    Eosinophils % 1.7 0.0 - 6.0 %    Basophils % 1.5 0.0 - 2.0 %    Neutrophils Absolute 2.12 1.60 - 5.50 x10*3/uL    Immature Granulocytes Absolute, Automated 0.01 0.00 - 0.50 x10*3/uL    Lymphocytes Absolute 1.81 0.80 - 3.00 x10*3/uL    Monocytes Absolute 0.50 0.05 - 0.80 x10*3/uL    Eosinophils Absolute 0.08 0.00 - 0.40 x10*3/uL    Basophils Absolute 0.07 0.00 - 0.10 x10*3/uL   Comprehensive metabolic panel   Result Value Ref Range    Glucose 93 74 - 99 mg/dL    Sodium 138 136 - 145 mmol/L    Potassium 3.4 (L) 3.5 - 5.3 mmol/L    Chloride 103 98 - 107 mmol/L    Bicarbonate 25 21 - 32 mmol/L    Anion Gap 13 10 - 20 mmol/L    Urea Nitrogen 34 (H) 6 - 23 mg/dL    Creatinine 2.17 (H) 0.50 - 1.05 mg/dL    eGFR 23 (L) >60 mL/min/1.73m*2    Calcium 8.7 8.6 - 10.3 mg/dL    Albumin 4.2 3.4 - 5.0 g/dL    Alkaline Phosphatase 63 33 - 136 U/L    Total Protein 7.1 6.4 - 8.2 g/dL    AST 31 9 - 39 U/L    Bilirubin, Total 0.3 0.0 - 1.2 mg/dL    ALT 26 7 - 45 U/L   Protime-INR   Result Value Ref Range    Protime 12.7 9.8 - 12.8 seconds    INR 1.1 0.9 - 1.1   Type And Screen   Result Value Ref Range    ABO TYPE O     Rh TYPE POS     ANTIBODY SCREEN NEG    Urinalysis with Reflex Culture and Microscopic   Result Value Ref Range    Color, Urine Light-Yellow Light-Yellow, Yellow, Dark-Yellow    Appearance, Urine Clear Clear    Specific Gravity, Urine 1.015 1.005 - 1.035    pH, Urine 5.5 5.0, 5.5, 6.0, 6.5, 7.0, 7.5, 8.0    Protein, Urine NEGATIVE NEGATIVE, 10 (TRACE), 20 (TRACE) mg/dL    Glucose, Urine Normal Normal mg/dL    Blood, Urine NEGATIVE NEGATIVE    Ketones, Urine 60 (2+) (A) NEGATIVE mg/dL    Bilirubin, Urine NEGATIVE NEGATIVE    Urobilinogen, Urine Normal Normal mg/dL    Nitrite, Urine NEGATIVE NEGATIVE    Leukocyte Esterase, Urine 25 Mani/µL (A) NEGATIVE   Microscopic Only, Urine   Result Value Ref  Range    WBC, Urine 1-5 1-5, NONE /HPF    RBC, Urine 1-2 NONE, 1-2, 3-5 /HPF    Squamous Epithelial Cells, Urine 1-9 (SPARSE) Reference range not established. /HPF   Lactate Dehydrogenase   Result Value Ref Range     84 - 246 U/L   Reticulocytes   Result Value Ref Range    Retic % 1.2 0.5 - 2.0 %    Retic Absolute 0.033 0.017 - 0.110 x10*6/uL    Reticulocyte Hemoglobin 31 28 - 38 pg    Immature Retic fraction 15.7 <=16.0 %   Occult Blood, Stool    Specimen: Stool   Result Value Ref Range    Occult Blood, Stool X1 Positive (A) Negative   Lavender Top   Result Value Ref Range    Extra Tube Hold for add-ons.    Iron and TIBC   Result Value Ref Range    Iron 39 35 - 150 ug/dL    UIBC 259 110 - 370 ug/dL    TIBC 298 240 - 445 ug/dL    % Saturation 13 (L) 25 - 45 %   TSH   Result Value Ref Range    Thyroid Stimulating Hormone 2.15 0.44 - 3.98 mIU/L   POCT GLUCOSE   Result Value Ref Range    POCT Glucose 105 (H) 74 - 99 mg/dL   Renal function panel   Result Value Ref Range    Glucose 106 (H) 74 - 99 mg/dL    Sodium 139 136 - 145 mmol/L    Potassium 3.2 (L) 3.5 - 5.3 mmol/L    Chloride 107 98 - 107 mmol/L    Bicarbonate 26 21 - 32 mmol/L    Anion Gap 9 (L) 10 - 20 mmol/L    Urea Nitrogen 25 (H) 6 - 23 mg/dL    Creatinine 1.82 (H) 0.50 - 1.05 mg/dL    eGFR 29 (L) >60 mL/min/1.73m*2    Calcium 8.2 (L) 8.6 - 10.3 mg/dL    Phosphorus 3.7 2.5 - 4.9 mg/dL    Albumin 3.4 3.4 - 5.0 g/dL       DATA:   Diagnostic tests reviewed for today's visit:    Labs and meds    ASSESSMENT AND PLAN:  - VICKI on CKD stage 3a: baseline Scr 1.2, most likely due to some volume contraction and NSAID use   Better this am, getting ivf   No rash/fever/eosinophilia, no obstruction by US, bland UA w/o protein, recent   HTN: controlled  Mild hypokalemia  No evidence of TMA  Non renal anemia, iron deficient     PLAN:  - ivf till tomorrow, keep lisinopril on hold, avoid additional NSAID use, getting immunologic VICKI work up, following labs, giving  iv iron    Greatly appreciate the opportunity to assist in the care of this patient. Will continue to follow.     Signature: Armand Groves MD  Division of Nephrology and Hypertension

## 2024-12-05 NOTE — PROGRESS NOTES
12/05/24 0855   Discharge Planning   Living Arrangements Alone   Support Systems Children;Family members;Friends/neighbors   Assistance Needed none   Type of Residence Private residence   Number of Stairs to Enter Residence 4   Number of Stairs Within Residence 12   Do you have animals or pets at home? No   Who is requesting discharge planning? Provider   Home or Post Acute Services None   Expected Discharge Disposition Home   Does the patient need discharge transport arranged? No   Financial Resource Strain   How hard is it for you to pay for the very basics like food, housing, medical care, and heating? Not hard   Housing Stability   In the last 12 months, was there a time when you were not able to pay the mortgage or rent on time? N   In the past 12 months, how many times have you moved where you were living? 0   At any time in the past 12 months, were you homeless or living in a shelter (including now)? N   Transportation Needs   In the past 12 months, has lack of transportation kept you from medical appointments or from getting medications? no   In the past 12 months, has lack of transportation kept you from meetings, work, or from getting things needed for daily living? No   Stroke Family Assessment   Stroke Family Assessment Needed No   Intensity of Service   Intensity of Service 0-30 min        12/05/24 0855   Discharge Planning   Living Arrangements Alone   Support Systems Children;Family members;Friends/neighbors   Assistance Needed none   Type of Residence Private residence   Number of Stairs to Enter Residence 4   Number of Stairs Within Residence 12   Do you have animals or pets at home? No   Who is requesting discharge planning? Provider   Home or Post Acute Services None   Expected Discharge Disposition Home   Does the patient need discharge transport arranged? No   Financial Resource Strain   How hard is it for you to pay for the very basics like food, housing, medical care, and heating? Not hard  Is the patient currently in the state of [place]? YES    Visit mode:VIDEO    If the visit is dropped, the patient can be reconnected by: VIDEO VISIT: Text to cell phone:   Telephone Information:   Mobile [phone]       Will anyone else be joining the visit? NO  (If patient encounters technical issues they should call [phone])    How would you like to obtain your AVS? MyChart    Are changes needed to the allergy or medication list? Pt stated no changes to allergies and Pt stated no med changes    Reason for visit: ISIDORO RUSSELLF         Housing Stability   In the last 12 months, was there a time when you were not able to pay the mortgage or rent on time? N   In the past 12 months, how many times have you moved where you were living? 0   At any time in the past 12 months, were you homeless or living in a shelter (including now)? N   Transportation Needs   In the past 12 months, has lack of transportation kept you from medical appointments or from getting medications? no   In the past 12 months, has lack of transportation kept you from meetings, work, or from getting things needed for daily living? No   Stroke Family Assessment   Stroke Family Assessment Needed No   Intensity of Service   Intensity of Service 0-30 min     12/5/24 0812  Spoke with patient over phone to discuss discharge planning.  Patient lives at home alone in a house.  She is independent with ADLs and drives at baseline.  She does not use any assistive devices for ambulation.  She plans on returning home at discharge.  She denies any HHC, DME, or discharge needs.  She will notify the [facility] should any needs arise.  Renal, endocrine, and GI consults pending.  ADOD 2-3 days.  Donna Aleman RN [facility]

## 2024-12-05 NOTE — H&P
"History Of Present Illness  Monica Trotter is a 74 y.o. female with a PMH of DM2, HLD, iron deficiency anemia, who was instructed to go to the ED for low Hb found on routine labs.     Ms Trotter had routine labs drawn yesterday, Hb ws found to be low at 7.3. She has been taking iron tablets daily for a hx of iron deficiency anemia, however, despite this, her Hb decreased from 10.1 Feb 2024 to 7.3.   Ms Trotter has not noticed any change in the color of her stool, no bright red blood per rectum, no hematuria, no source of blood loss.   She has been symptomatic, with fatigue, light-headedness. She also c/o feeling \"cold\" for the past week.   Denies CP or SOB.   Has been experiencing PICA, eating ice.     She denies hematuria, dysuria, any urinary symptoms except for occ foul smelling urine.  PO intake has been good. No hx of renal problems. However, Cr found to be elevated at 2.17, prior 1.23 (2/24).     She also reports difficulty with HbA1c in the past, however, started taking Ozempic. She was apparently supposed to stop metformin in September, however, continue to take metformin 1000mg BID. Her PCP recently instructed her to take only 1000mg daily.     Work up in the ED showed hb 7.7.   Cr 2.17.   UA + LE    CT A/P showed left renal lesion 3.6 x 2cm and scattered diverticulosis.      Past Medical History  Past Medical History:   Diagnosis Date    Arthritis     Body mass index (BMI) 31.0-31.9, adult 12/07/2021    BMI 31.0-31.9,adult    Body mass index (BMI) 32.0-32.9, adult 07/12/2021    Body mass index (BMI) of 32.0 to 32.9 in adult    Body mass index (BMI)30.0-30.9, adult 05/03/2022    Body mass index (BMI) of 30.0 to 30.9 in adult    Body mass index (BMI)30.0-30.9, adult 09/10/2020    BMI 30.0-30.9,adult    Cataract     Encounter for immunization 09/28/2018    Need for prophylactic vaccination and inoculation against influenza    HTN (hypertension)     Hyperlipidemia     Personal history of other drug therapy " 03/11/2021    History of pneumococcal vaccination    Type 2 diabetes mellitus     Vitreous degeneration, unspecified eye     Vitreous degeneration       Surgical History  Past Surgical History:   Procedure Laterality Date    CHOLECYSTECTOMY      COLONOSCOPY  03/18/2013    Complete Colonoscopy    HYSTERECTOMY          Social History  She reports that she has never smoked. She has been exposed to tobacco smoke. She has never used smokeless tobacco. She reports that she does not currently use alcohol. She reports that she does not currently use drugs.    Family History  Family History   Problem Relation Name Age of Onset    No Known Problems Mother      No Known Problems Father          Allergies  Sulfur and Sulfa (sulfonamide antibiotics)    Review of Systems   Constitutional:  Positive for chills and fatigue. Negative for appetite change.   HENT: Negative.     Eyes: Negative.    Respiratory: Negative.  Negative for shortness of breath.    Cardiovascular: Negative.  Negative for chest pain.   Gastrointestinal: Negative.  Negative for abdominal pain, blood in stool, diarrhea, nausea and vomiting.   Genitourinary: Negative.  Negative for difficulty urinating, dysuria, frequency and hematuria.   Musculoskeletal: Negative.    Skin: Negative.    Allergic/Immunologic: Negative.    Neurological: Negative.    Hematological: Negative.    Psychiatric/Behavioral: Negative.          Physical Exam  Vitals reviewed.   Constitutional:       Appearance: Normal appearance.      Comments: Pleasant elderly female, alert, oriented, in no distress.    HENT:      Head: Normocephalic and atraumatic.      Mouth/Throat:      Mouth: Mucous membranes are moist.   Eyes:      General: No scleral icterus.     Extraocular Movements: Extraocular movements intact.      Conjunctiva/sclera: Conjunctivae normal.      Pupils: Pupils are equal, round, and reactive to light.   Cardiovascular:      Rate and Rhythm: Normal rate and regular rhythm.       "Pulses: Normal pulses.      Heart sounds: Normal heart sounds.   Pulmonary:      Effort: Pulmonary effort is normal.      Breath sounds: Normal breath sounds.      Comments: Lungs clear.   Abdominal:      General: Abdomen is flat. Bowel sounds are normal.      Palpations: Abdomen is soft.      Comments: Soft, non-distended, non-tender.    Musculoskeletal:         General: Normal range of motion.      Right lower leg: No edema.      Left lower leg: No edema.   Skin:     General: Skin is warm and dry.   Neurological:      General: No focal deficit present.      Mental Status: She is alert and oriented to person, place, and time.   Psychiatric:         Mood and Affect: Mood normal.         Behavior: Behavior normal.         Thought Content: Thought content normal.         Judgment: Judgment normal.          Last Recorded Vitals  Blood pressure 120/57, pulse 84, temperature 36.9 °C (98.4 °F), temperature source Temporal, resp. rate 18, height 1.702 m (5' 7\"), weight 64.9 kg (143 lb), SpO2 100%.    Relevant Results        Results for orders placed or performed during the hospital encounter of 12/04/24 (from the past 24 hours)   Reticulocytes   Result Value Ref Range    Retic % 1.4 0.5 - 2.0 %    Retic Absolute 0.036 0.017 - 0.110 x10*6/uL    Reticulocyte Hemoglobin 30 28 - 38 pg    Immature Retic fraction 19.1 (H) <=16.0 %   CBC and Auto Differential   Result Value Ref Range    WBC 4.6 4.4 - 11.3 x10*3/uL    nRBC 0.0 0.0 - 0.0 /100 WBCs    RBC 2.83 (L) 4.00 - 5.20 x10*6/uL    Hemoglobin 7.7 (L) 12.0 - 16.0 g/dL    Hematocrit 24.6 (L) 36.0 - 46.0 %    MCV 87 80 - 100 fL    MCH 27.2 26.0 - 34.0 pg    MCHC 31.3 (L) 32.0 - 36.0 g/dL    RDW 13.5 11.5 - 14.5 %    Platelets 289 150 - 450 x10*3/uL    Neutrophils % 46.3 40.0 - 80.0 %    Immature Granulocytes %, Automated 0.2 0.0 - 0.9 %    Lymphocytes % 39.4 13.0 - 44.0 %    Monocytes % 10.9 2.0 - 10.0 %    Eosinophils % 1.7 0.0 - 6.0 %    Basophils % 1.5 0.0 - 2.0 %    " Neutrophils Absolute 2.12 1.60 - 5.50 x10*3/uL    Immature Granulocytes Absolute, Automated 0.01 0.00 - 0.50 x10*3/uL    Lymphocytes Absolute 1.81 0.80 - 3.00 x10*3/uL    Monocytes Absolute 0.50 0.05 - 0.80 x10*3/uL    Eosinophils Absolute 0.08 0.00 - 0.40 x10*3/uL    Basophils Absolute 0.07 0.00 - 0.10 x10*3/uL   Comprehensive metabolic panel   Result Value Ref Range    Glucose 93 74 - 99 mg/dL    Sodium 138 136 - 145 mmol/L    Potassium 3.4 (L) 3.5 - 5.3 mmol/L    Chloride 103 98 - 107 mmol/L    Bicarbonate 25 21 - 32 mmol/L    Anion Gap 13 10 - 20 mmol/L    Urea Nitrogen 34 (H) 6 - 23 mg/dL    Creatinine 2.17 (H) 0.50 - 1.05 mg/dL    eGFR 23 (L) >60 mL/min/1.73m*2    Calcium 8.7 8.6 - 10.3 mg/dL    Albumin 4.2 3.4 - 5.0 g/dL    Alkaline Phosphatase 63 33 - 136 U/L    Total Protein 7.1 6.4 - 8.2 g/dL    AST 31 9 - 39 U/L    Bilirubin, Total 0.3 0.0 - 1.2 mg/dL    ALT 26 7 - 45 U/L   Protime-INR   Result Value Ref Range    Protime 12.7 9.8 - 12.8 seconds    INR 1.1 0.9 - 1.1   Type And Screen   Result Value Ref Range    ABO TYPE O     Rh TYPE POS     ANTIBODY SCREEN NEG    Urinalysis with Reflex Culture and Microscopic   Result Value Ref Range    Color, Urine Light-Yellow Light-Yellow, Yellow, Dark-Yellow    Appearance, Urine Clear Clear    Specific Gravity, Urine 1.015 1.005 - 1.035    pH, Urine 5.5 5.0, 5.5, 6.0, 6.5, 7.0, 7.5, 8.0    Protein, Urine NEGATIVE NEGATIVE, 10 (TRACE), 20 (TRACE) mg/dL    Glucose, Urine Normal Normal mg/dL    Blood, Urine NEGATIVE NEGATIVE    Ketones, Urine 60 (2+) (A) NEGATIVE mg/dL    Bilirubin, Urine NEGATIVE NEGATIVE    Urobilinogen, Urine Normal Normal mg/dL    Nitrite, Urine NEGATIVE NEGATIVE    Leukocyte Esterase, Urine 25 Mani/µL (A) NEGATIVE   Microscopic Only, Urine   Result Value Ref Range    WBC, Urine 1-5 1-5, NONE /HPF    RBC, Urine 1-2 NONE, 1-2, 3-5 /HPF    Squamous Epithelial Cells, Urine 1-9 (SPARSE) Reference range not established. /HPF   Lactate Dehydrogenase    Result Value Ref Range     84 - 246 U/L   Reticulocytes   Result Value Ref Range    Retic % 1.2 0.5 - 2.0 %    Retic Absolute 0.033 0.017 - 0.110 x10*6/uL    Reticulocyte Hemoglobin 31 28 - 38 pg    Immature Retic fraction 15.7 <=16.0 %   Occult Blood, Stool    Specimen: Stool   Result Value Ref Range    Occult Blood, Stool X1 Positive (A) Negative     CT abdomen pelvis wo IV contrast    Result Date: 12/4/2024  Interpreted By:  Violette Britton, STUDY: CT ABDOMEN PELVIS WO IV CONTRAST;  12/4/2024 6:34 pm   INDICATION: Signs/Symptoms:eval kidneys for worsening kidney function.     COMPARISON: CT pulmonary angiogram 02/01/2022.   ACCESSION NUMBER(S): [id number]   ORDERING CLINICIAN: PABLO LUQUE   TECHNIQUE: CT of the abdomen and pelvis was performed. Contiguous axial images were obtained at 3 mm slice thickness through the abdomen and pelvis. Coronal and sagittal reconstructions at 3 mm slice thickness were performed.  No intravenous contrast was administered; positive oral contrast was given.   FINDINGS: Please note that the evaluation of vessels, lymph nodes and organs is limited without intravenous contrast.   LOWER CHEST: Mild patchy linear subsegmental atelectasis in the lung bases.   ABDOMEN:   LIVER: Small hypodense lesion consistent with hepatic cysts in the right liver lobe segment 5 has decreased in size to 1.1 cm from 1.4 cm since 2022. Subcentimeter hypodense probable hepatic cyst noted in the superior left liver lobe.   BILE DUCTS: The intrahepatic and extrahepatic ducts are not dilated.   GALLBLADDER: Prior cholecystectomy.   PANCREAS: Unenhanced pancreas is unremarkable.   SPLEEN: Normal spleen size without focal lesion.   ADRENAL GLANDS: Within normal limits.   KIDNEYS AND URETERS: Hypodense lesions measuring fluid attenuation measure 3.6 cm in the anterior left mid kidney and 2 cm in the anterior left upper-mid kidney junction. These are incompletely characterized without contrast, but  likely represent renal cysts. Unenhanced kidneys are otherwise unremarkable. No renal stone or hydronephrosis. No perinephric inflammatory changes.   PELVIS:   BLADDER: Well-distended urinary bladder is unremarkable.   REPRODUCTIVE ORGANS: Prior hysterectomy.   BOWEL: Stomach, small bowel loops and colon are normal in caliber without wall thickening or adjacent inflammatory changes. Scattered colonic diverticulosis noted without inflammatory changes of diverticulitis. Appendix is normal.   VESSELS: Abdominal aorta is normal in caliber with moderate atherosclerotic calcifications.   PERITONEUM/RETROPERITONEUM/LYMPH NODES: No ascites or free air, no fluid collection.  No abdominopelvic lymphadenopathy is present.   ABDOMINAL WALL: Mild diffuse abdominal wall subcutaneous edema.   BONES: No acute bony abnormality or suspicious bone lesion. Mild multilevel thoracic spine degenerative disc disease.       1. No evidence of acute abnormality in the abdomen or pelvis. 2. Hypodense left renal lesions measuring 3.6 cm and 2 cm may represent renal cysts, but are incompletely characterized without contrast. These may be further assessed with nonemergent renal ultrasound. 3. Prior cholecystectomy. 4. Scattered colonic diverticulosis.     MACRO: None   Signed by: Violette Britton 12/4/2024 6:59 PM Dictation workstation:   [id number]        Assessment/Plan   Assessment & Plan  VICKI (acute kidney injury) (CMS-HCC)    - uncertain etiology, po intake normal.   - IVF  - US kidneys  - consult nephrology  - Lesion in left kidney, 3.6 x 2cm, however, would not expect this to cause elevated Cr. Mass to be further characterized with US.     Anemia  - has been taking iron supplements daily  - check iron levels; may need additional iron daily or iron infusions.   - has been experiencing PICA, so iron levels likely will be low.   - check Vit B12, folate, may be deficient in these also.   - recticulocyte count ordered.     DM2  - Consult   Daija for assistance in medication   - ISS  - check HbA1c    Feeling cold, fatigued  - check TSH    UTI  - Ceftriaxone    Code status   FULL       I spent 60 minutes in the professional and overall care of this patient.      Madiha Knapp MD

## 2024-12-05 NOTE — PROGRESS NOTES
Pharmacy Medication History     Additional concerns with the patient's PTA list.   Confirmed medications with patient    The following updates were made to the Prior to Admission medication list:     Source of Information:     Medications ADDED:   N/a  Medications CHANGED:  N/a  Medications REMOVED:   N/a  Medications NOT TAKING:   N/a    Allergy reviewed : Yes    Meds 2 Beds : No    Comments: n/a     The list below reflectives the updated PTA list. Please review each medication in order reconciliation for additional clarification and justification.    Prior to Admission Medications   Prescriptions Last Dose Informant   FreeStyle Lite Strips strip Unknown Self   Sig: Use to test blood sugars twice daily   acetaminophen (Tylenol 8 Hour) 650 mg ER tablet Unknown Self   Sig: Take 1 tablet (650 mg) by mouth every 8 hours if needed for mild pain (1 - 3). Do not crush, chew, or split.   amitriptyline (Elavil) 25 mg tablet Unknown Self   Sig: Take 1 tablet (25 mg) by mouth once daily at bedtime.   chlorthalidone (Hygroton) 25 mg tablet Unknown Self   Sig: Take 1 tablet (25 mg) by mouth once daily.   cholecalciferol (Vitamin D3) 5,000 Units tablet Unknown Self   Sig: Take 1 tablet (5,000 Units) by mouth once daily.   cyclobenzaprine (Flexeril) 5 mg tablet Unknown Self   Sig: Take 1 tablet (5 mg) by mouth as needed at bedtime for muscle spasms.   inulin (FIBER GUMMIES ORAL) Unknown Self   Sig: Take 2 tablets by mouth once daily.   lisinopril 10 mg tablet Unknown Self   Sig: Take 1 tablet (10 mg) by mouth once daily.   metFORMIN (Glucophage) 1,000 mg tablet Unknown Self   Sig: Take 1 tablet (1,000 mg) by mouth once daily with breakfast.   rosuvastatin (Crestor) 40 mg tablet Unknown Self   Sig: Take 1 tablet (40 mg) by mouth once daily.   semaglutide 0.25 mg or 0.5 mg (2 mg/3 mL) pen injector Unknown Self   Sig: Inject 0.25 mg under the skin 1 (one) time per week.      Facility-Administered Medications: None       The list  below reflectives the updated allergy list. Please review each documented allergy for additional clarification and justification.    Allergies   Allergen Reactions    Sulfur Unknown    Sulfa (Sulfonamide Antibiotics) Unknown and Rash          12/04/24 at 8:16 PM - Soren Serrano

## 2024-12-05 NOTE — CONSULTS
Inpatient consult to Endocrinology  Consult performed by: Jadiel Choe MD  Consult ordered by: Madiha Knapp MD      Reason For Consult  Diabetes    History Of Present Illness  Monica Trotter is a 74 y.o. female admitted with weakness, anemia    Duration of type 2 diabetes mellitus:  35 years    Home regimen:  Metformin 1000 mg/day.  Patient states she was not aware she was supposed to have stopped it 3 months ago  Ozempic decreased to 0.25 mg/week  History of weight loss    Patient wears Selina 3 continuous glucose monitor  She has had low readings, some not correlating to fingerstick.      Medications    Current Facility-Administered Medications:     acetaminophen (Tylenol) tablet 650 mg, 650 mg, oral, q4h PRN, Madiha Knapp MD    acetaminophen (Tylenol) tablet 650 mg, 650 mg, oral, q4h PRN, Madiha Knpap MD    amitriptyline (Elavil) tablet 25 mg, 25 mg, oral, Nightly, Madiha Knapp MD, 25 mg at 12/05/24 0013    cefTRIAXone (Rocephin) 1 g in dextrose (iso) IV 50 mL, 1 g, intravenous, q24h CHELITA, Madiha Knapp MD, Stopped at 12/05/24 0053    chlorthalidone (Hygroton) tablet 25 mg, 25 mg, oral, Daily, Madiha Knapp MD    cholecalciferol (Vitamin D-3) tablet 5,000 Units, 5,000 Units, oral, Daily, Madiha Knapp MD    cyclobenzaprine (Flexeril) tablet 5 mg, 5 mg, oral, Nightly PRN, Madiha Knapp MD    dextrose 50 % injection 12.5 g, 12.5 g, intravenous, q15 min PRN, Madiha Knapp MD    dextrose 50 % injection 25 g, 25 g, intravenous, q15 min PRN, Madiha Knapp MD    enoxaparin (Lovenox) syringe 30 mg, 30 mg, subcutaneous, [facility]h [facility], Madiha Knapp MD    ferrous sulfate (325 mg ferrous sulfate) tablet 325 mg, 65 mg of iron, oral, BID, Madiha Knapp MD    glucagon (Glucagen) injection 1 mg, 1 mg, intramuscular, q15 min PRN, Madiha Knapp MD    glucagon (Glucagen) injection 1 mg, 1 mg, intramuscular, q15 min PRN, Madiha Knapp MD    insulin lispro injection 0-10 Units, 0-10 Units, subcutaneous, TID Madiha WORRELL  HARLEEN Knapp MD    [Held by provider] lisinopril tablet 10 mg, 10 mg, oral, Daily, Madiha Knapp MD    melatonin tablet 3 mg, 3 mg, oral, Nightly PRN, Madiha Knapp MD    ondansetron ODT (Zofran-ODT) disintegrating tablet 4 mg, 4 mg, oral, q8h PRN **OR** ondansetron (Zofran) injection 4 mg, 4 mg, intravenous, q8h PRN, Madiha Knapp MD    polyethylene glycol (Glycolax, Miralax) packet 17 g, 17 g, oral, Daily, Madiha Knapp MD    rosuvastatin (Crestor) tablet 10 mg, 10 mg, oral, Daily, Madiha Knapp MD    sodium chloride 0.9% infusion, 100 mL/hr, intravenous, Continuous, Madiha Knapp MD, Last Rate: 100 mL/hr at 12/05/24 0012, 100 mL/hr at 12/05/24 0012     Past Medical History  She has a past medical history of Arthritis, Body mass index (BMI) 31.0-31.9, adult (12/07/2021), Body mass index (BMI) 32.0-32.9, adult (07/12/2021), Body mass index (BMI)30.0-30.9, adult (05/03/2022), Body mass index (BMI)30.0-30.9, adult (09/10/2020), Cataract, Encounter for immunization (09/28/2018), HTN (hypertension), Hyperlipidemia, Personal history of other drug therapy (03/11/2021), Type 2 diabetes mellitus, and Vitreous degeneration, unspecified eye.    Surgical History  She has a past surgical history that includes Colonoscopy (03/18/2013); Hysterectomy; and Cholecystectomy.     Social History  She reports that she has never smoked. She has been exposed to tobacco smoke. She has never used smokeless tobacco. She reports that she does not currently use alcohol. She reports that she does not currently use drugs.    Family History  Family History   Problem Relation Name Age of Onset    No Known Problems Mother      No Known Problems Father         Allergies  Sulfur and Sulfa (sulfonamide antibiotics)    Review of Systems   Constitutional:  Positive for unexpected weight change.   Respiratory:  Negative for shortness of breath.    Cardiovascular:  Negative for chest pain.   Gastrointestinal:  Negative for blood in stool.   Endocrine:  "       As above   Neurological:  Positive for weakness.         Last Recorded Vitals  Blood pressure 109/60, pulse 68, temperature 37.1 °C (98.7 °F), temperature source Temporal, resp. rate 17, height 1.702 m (5' 7\"), weight 64.9 kg (143 lb), SpO2 99%.    Physical Exam  Constitutional:       General: She is not in acute distress.  HENT:      Head: Normocephalic.      Mouth/Throat:      Mouth: Mucous membranes are moist.   Eyes:      Extraocular Movements: Extraocular movements intact.   Neck:      Thyroid: No thyromegaly.   Cardiovascular:      Pulses:           Carotid pulses are 2+ on the right side and 2+ on the left side.  Musculoskeletal:      Right lower leg: No edema.      Left lower leg: No edema.      Right foot: No deformity.      Left foot: No deformity.   Neurological:      Mental Status: She is alert.      Motor: No tremor.   Psychiatric:         Mood and Affect: Affect normal.          Relevant Results  Lab Results   Component Value Date    POCGLU 204 (H) 05/02/2024    POCGLU 96 05/02/2024    POCGLU 256 (H) 04/19/2022    GLUCOSE 93 12/04/2024    GLUCOSE 115 (H) 12/04/2024    GLUCOSE 115 (H) 02/07/2024    GLUCOSE 121 (H) 08/29/2023    GLUCOSE 220 (H) 12/29/2022      Latest Reference Range & Units 12/04/24 10:30 12/04/24 16:30   GLUCOSE 74 - 99 mg/dL 115 (H) 93   SODIUM 136 - 145 mmol/L 138 138   POTASSIUM 3.5 - 5.3 mmol/L 3.5 3.4 (L)   CHLORIDE 98 - 107 mmol/L 103 103   Bicarbonate 21 - 32 mmol/L 25 25   Anion Gap 10 - 20 mmol/L 14 13   Blood Urea Nitrogen 6 - 23 mg/dL 34 (H) 34 (H)   Creatinine 0.50 - 1.05 mg/dL 2.13 (H) 2.17 (H)   EGFR >60 mL/min/1.73m*2 24 (L) 23 (L)   Calcium 8.6 - 10.3 mg/dL 8.8 8.7      Latest Reference Range & Units 12/04/24 10:30   Hemoglobin A1C See comment % 5.8 (H)       IMPRESSION  TYPE 2 DIABETES MELLITUS  Tight glucose control  Recent weight loss  No medications expected to cause hypoglycemia  She may have some falsely low Selina readings from compression of sensor at " night  Metformin currently contraindicated due to acute on chronic renal insufficiency.      RECOMMENDATIONS  Stop metformin and Ozempic  No antidiabetic medication required  Follow up with Dr. Ramos    I will sign off.  Please call if any questions.       Jadiel Choe MD

## 2024-12-05 NOTE — PROGRESS NOTES
Monica Trotter is a 74 y.o. female on day 1 of admission presenting with VICKI (acute kidney injury) (CMS-[facility]).      Subjective   Pt seen this morning, on room air, feels ok, no sob or cp, afebrile, no overnight events reported.        Objective     Last Recorded Vitals  /54   Pulse 86   Temp 36.9 °C (98.4 °F) (Temporal)   Resp 18   Wt 64.9 kg (143 lb)   SpO2 100%   Intake/Output last 3 Shifts:    Intake/Output Summary (Last 24 hours) at 12/5/2024 1710  Last data filed at 12/5/2024 1217  Gross per 24 hour   Intake 1000 ml   Output 800 ml   Net 200 ml       Admission Weight  Weight: 64.9 kg (143 lb) (12/04/24 1357)    Daily Weight  12/04/24 : 64.9 kg (143 lb)    Image Results  ECG 12 lead  Sinus rhythm with occasional Premature ventricular complexes  Right bundle branch block  Abnormal ECG  When compared with ECG of 15-MAY-2024 11:51,  Premature ventricular complexes are now Present  Premature atrial complexes are no longer Present  US renal complete  Narrative: Interpreted By:  Shawn Phan,   STUDY:  US RENAL COMPLETE;  12/5/2024 12:35 am      INDICATION:  Signs/Symptoms:VICKI.      COMPARISON:  1/9/2023      ACCESSION NUMBER(S):  [id number]      ORDERING CLINICIAN:  MAITE ALTAMIRANO      TECHNIQUE:  Multiple sonographic grayscale and color images of the kidneys were  performed.      FINDINGS:  The right kidney measures 10.2 cm in length. The left kidney measures  9.9 cm in length. There are left renal cysts with largest measuring  3.6 cm and 1.9 cm. No significant left or right hydronephrosis.      Urinary bladder is underdistended and not well evaluated.      Impression: No significant hydronephrosis.      Left renal cysts.      MACRO:  None      Signed by: Shawn Phan 12/5/2024 1:05 AM  Dictation workstation:   [id number]      Physical Exam  Constitutional:       Appearance: Normal appearance.   Cardiovascular:      Rate and Rhythm: Normal rate and regular rhythm.   Pulmonary:      Effort: Pulmonary  effort is normal.      Breath sounds: Normal breath sounds.   Abdominal:      General: Abdomen is flat. Bowel sounds are normal.      Palpations: Abdomen is soft.   Musculoskeletal:      Cervical back: Normal range of motion.   Skin:     General: Skin is warm.   Neurological:      General: No focal deficit present.      Mental Status: She is alert and oriented to person, place, and time. Mental status is at baseline.   Psychiatric:         Mood and Affect: Mood normal.         Behavior: Behavior normal.         Thought Content: Thought content normal.         Judgment: Judgment normal.         Relevant Results      Assessment & Plan  RAJAN (acute kidney injury) (CMS-[facility])    12/5:  Anemia... likely chronic related to ckd. Fe and folate nl. B12 is borderline so will replace. She is fob+ with frequent nsaid use so GI will take for upper endoscopy tomorrow. She mentions she recently had a hernia procedure.     Rajan/ckd.... ivf, hold home AceI, iv Fe per renal doc, f/up renal workup. She does not have an OP nephrologist. Chlorthalidone also on hold.     Low Vit D... suspect related to ckd... f/up renal.     DM.... A1c 5.8%.... evaluated by endo and plan is to stop home metformin and ozempic. No further DM meds needed.     I don't think she has a UTI, UA is bland, she denies urinary symptoms... stop abx.     Home regimen for chronic conditions  Dvt px with lovenox.  She lives at home solo, no cane baseline.              Sorin Barker MD

## 2024-12-06 ENCOUNTER — APPOINTMENT (OUTPATIENT)
Dept: RADIOLOGY | Facility: HOSPITAL | Age: 74
End: 2024-12-06
Payer: MEDICARE

## 2024-12-06 ENCOUNTER — ANESTHESIA (OUTPATIENT)
Dept: GASTROENTEROLOGY | Facility: HOSPITAL | Age: 74
End: 2024-12-06
Payer: MEDICARE

## 2024-12-06 ENCOUNTER — ANESTHESIA EVENT (OUTPATIENT)
Dept: GASTROENTEROLOGY | Facility: HOSPITAL | Age: 74
End: 2024-12-06
Payer: MEDICARE

## 2024-12-06 ENCOUNTER — APPOINTMENT (OUTPATIENT)
Dept: GASTROENTEROLOGY | Facility: HOSPITAL | Age: 74
End: 2024-12-06
Payer: MEDICARE

## 2024-12-06 LAB
ABO GROUP (TYPE) IN BLOOD: NORMAL
ALBUMIN SERPL BCP-MCNC: 3.2 G/DL (ref 3.4–5)
ANA PATTERN: ABNORMAL
ANA SER QL HEP2 SUBST: POSITIVE
ANA TITR SER IF: ABNORMAL {TITER}
ANION GAP SERPL CALC-SCNC: 9 MMOL/L (ref 10–20)
BACTERIA UR CULT: NORMAL
BLOOD EXPIRATION DATE: NORMAL
BLOOD EXPIRATION DATE: NORMAL
BUN SERPL-MCNC: 22 MG/DL (ref 6–23)
CALCIUM SERPL-MCNC: 7.8 MG/DL (ref 8.6–10.3)
CENTROMERE B AB SER-ACNC: <0.2 AI
CHLORIDE SERPL-SCNC: 109 MMOL/L (ref 98–107)
CHROMATIN AB SERPL-ACNC: <0.2 AI
CO2 SERPL-SCNC: 24 MMOL/L (ref 21–32)
CREAT SERPL-MCNC: 1.76 MG/DL (ref 0.5–1.05)
DISPENSE STATUS: NORMAL
DISPENSE STATUS: NORMAL
DSDNA AB SER-ACNC: <1 IU/ML
EGFRCR SERPLBLD CKD-EPI 2021: 30 ML/MIN/1.73M*2
ENA JO1 AB SER QL IA: <0.2 AI
ENA RNP AB SER IA-ACNC: <0.2 AI
ENA SCL70 AB SER QL IA: <0.2 AI
ENA SM AB SER IA-ACNC: <0.2 AI
ENA SM+RNP AB SER QL IA: <0.2 AI
ENA SS-A AB SER IA-ACNC: <0.2 AI
ENA SS-B AB SER IA-ACNC: <0.2 AI
ERYTHROCYTE [DISTWIDTH] IN BLOOD BY AUTOMATED COUNT: 13.5 % (ref 11.5–14.5)
GLUCOSE BLD MANUAL STRIP-MCNC: 102 MG/DL (ref 74–99)
GLUCOSE BLD MANUAL STRIP-MCNC: 134 MG/DL (ref 74–99)
GLUCOSE BLD MANUAL STRIP-MCNC: 63 MG/DL (ref 74–99)
GLUCOSE BLD MANUAL STRIP-MCNC: 68 MG/DL (ref 74–99)
GLUCOSE BLD MANUAL STRIP-MCNC: 75 MG/DL (ref 74–99)
GLUCOSE SERPL-MCNC: 91 MG/DL (ref 74–99)
HCT VFR BLD AUTO: 19.2 % (ref 36–46)
HGB BLD-MCNC: 6.1 G/DL (ref 12–16)
KAPPA LC SERPL-MCNC: 6.05 MG/DL (ref 0.33–1.94)
KAPPA LC/LAMBDA SER: 1.95 {RATIO} (ref 0.26–1.65)
LAMBDA LC SERPL-MCNC: 3.11 MG/DL (ref 0.57–2.63)
MCH RBC QN AUTO: 27.4 PG (ref 26–34)
MCHC RBC AUTO-ENTMCNC: 31.8 G/DL (ref 32–36)
MCV RBC AUTO: 86 FL (ref 80–100)
NRBC BLD-RTO: 0 /100 WBCS (ref 0–0)
PHOSPHATE SERPL-MCNC: 3.7 MG/DL (ref 2.5–4.9)
PLATELET # BLD AUTO: 184 X10*3/UL (ref 150–450)
POTASSIUM SERPL-SCNC: 3.1 MMOL/L (ref 3.5–5.3)
PRODUCT BLOOD TYPE: 5100
PRODUCT BLOOD TYPE: 5100
PRODUCT CODE: NORMAL
PRODUCT CODE: NORMAL
RBC # BLD AUTO: 2.23 X10*6/UL (ref 4–5.2)
RH FACTOR (ANTIGEN D): NORMAL
RIBOSOMAL P AB SER-ACNC: <0.2 AI
SODIUM SERPL-SCNC: 139 MMOL/L (ref 136–145)
UNIT ABO: NORMAL
UNIT ABO: NORMAL
UNIT NUMBER: NORMAL
UNIT NUMBER: NORMAL
UNIT RH: NORMAL
UNIT RH: NORMAL
UNIT VOLUME: 350
UNIT VOLUME: 350
WBC # BLD AUTO: 4 X10*3/UL (ref 4.4–11.3)
XM INTEP: NORMAL
XM INTEP: NORMAL

## 2024-12-06 PROCEDURE — P9016 RBC LEUKOCYTES REDUCED: HCPCS

## 2024-12-06 PROCEDURE — 7100000010 HC PHASE TWO TIME - EACH INCREMENTAL 1 MINUTE

## 2024-12-06 PROCEDURE — 2500000001 HC RX 250 WO HCPCS SELF ADMINISTERED DRUGS (ALT 637 FOR MEDICARE OP): Performed by: HOSPITALIST

## 2024-12-06 PROCEDURE — 82947 ASSAY GLUCOSE BLOOD QUANT: CPT

## 2024-12-06 PROCEDURE — 3700000002 HC GENERAL ANESTHESIA TIME - EACH INCREMENTAL 1 MINUTE

## 2024-12-06 PROCEDURE — 36430 TRANSFUSION BLD/BLD COMPNT: CPT | Performed by: ANESTHESIOLOGIST ASSISTANT

## 2024-12-06 PROCEDURE — 43270 EGD LESION ABLATION: CPT

## 2024-12-06 PROCEDURE — 2500000004 HC RX 250 GENERAL PHARMACY W/ HCPCS (ALT 636 FOR OP/ED): Performed by: INTERNAL MEDICINE

## 2024-12-06 PROCEDURE — 1100000001 HC PRIVATE ROOM DAILY

## 2024-12-06 PROCEDURE — 2500000004 HC RX 250 GENERAL PHARMACY W/ HCPCS (ALT 636 FOR OP/ED): Performed by: ANESTHESIOLOGIST ASSISTANT

## 2024-12-06 PROCEDURE — 2500000001 HC RX 250 WO HCPCS SELF ADMINISTERED DRUGS (ALT 637 FOR MEDICARE OP): Performed by: INTERNAL MEDICINE

## 2024-12-06 PROCEDURE — 99233 SBSQ HOSP IP/OBS HIGH 50: CPT | Performed by: INTERNAL MEDICINE

## 2024-12-06 PROCEDURE — 84100 ASSAY OF PHOSPHORUS: CPT | Performed by: INTERNAL MEDICINE

## 2024-12-06 PROCEDURE — 3700000001 HC GENERAL ANESTHESIA TIME - INITIAL BASE CHARGE

## 2024-12-06 PROCEDURE — 7100000009 HC PHASE TWO TIME - INITIAL BASE CHARGE

## 2024-12-06 PROCEDURE — 2500000002 HC RX 250 W HCPCS SELF ADMINISTERED DRUGS (ALT 637 FOR MEDICARE OP, ALT 636 FOR OP/ED): Performed by: HOSPITALIST

## 2024-12-06 PROCEDURE — 36415 COLL VENOUS BLD VENIPUNCTURE: CPT | Performed by: INTERNAL MEDICINE

## 2024-12-06 PROCEDURE — 0W3P8ZZ CONTROL BLEEDING IN GASTROINTESTINAL TRACT, VIA NATURAL OR ARTIFICIAL OPENING ENDOSCOPIC: ICD-10-PCS | Performed by: INTERNAL MEDICINE

## 2024-12-06 PROCEDURE — 36430 TRANSFUSION BLD/BLD COMPNT: CPT

## 2024-12-06 PROCEDURE — 2500000004 HC RX 250 GENERAL PHARMACY W/ HCPCS (ALT 636 FOR OP/ED): Performed by: HOSPITALIST

## 2024-12-06 PROCEDURE — 2720000007 HC OR 272 NO HCPCS

## 2024-12-06 PROCEDURE — 2500000005 HC RX 250 GENERAL PHARMACY W/O HCPCS: Performed by: ANESTHESIOLOGY

## 2024-12-06 PROCEDURE — 2500000002 HC RX 250 W HCPCS SELF ADMINISTERED DRUGS (ALT 637 FOR MEDICARE OP, ALT 636 FOR OP/ED): Performed by: INTERNAL MEDICINE

## 2024-12-06 PROCEDURE — 85027 COMPLETE CBC AUTOMATED: CPT | Performed by: INTERNAL MEDICINE

## 2024-12-06 PROCEDURE — 43270 EGD LESION ABLATION: CPT | Performed by: INTERNAL MEDICINE

## 2024-12-06 RX ORDER — PROPOFOL 10 MG/ML
INJECTION, EMULSION INTRAVENOUS AS NEEDED
Status: DISCONTINUED | OUTPATIENT
Start: 2024-12-06 | End: 2024-12-06

## 2024-12-06 RX ORDER — POTASSIUM CHLORIDE 20 MEQ/1
40 TABLET, EXTENDED RELEASE ORAL ONCE
Status: COMPLETED | OUTPATIENT
Start: 2024-12-06 | End: 2024-12-06

## 2024-12-06 RX ORDER — LIDOCAINE HYDROCHLORIDE 20 MG/ML
INJECTION, SOLUTION EPIDURAL; INFILTRATION; INTRACAUDAL; PERINEURAL AS NEEDED
Status: DISCONTINUED | OUTPATIENT
Start: 2024-12-06 | End: 2024-12-06

## 2024-12-06 RX ORDER — MIDAZOLAM HYDROCHLORIDE 1 MG/ML
INJECTION INTRAMUSCULAR; INTRAVENOUS AS NEEDED
Status: DISCONTINUED | OUTPATIENT
Start: 2024-12-06 | End: 2024-12-06

## 2024-12-06 RX ORDER — DEXTROSE 50 % IN WATER (D50W) INTRAVENOUS SYRINGE
12.5 ONCE
Status: COMPLETED | OUTPATIENT
Start: 2024-12-06 | End: 2024-12-06

## 2024-12-06 ASSESSMENT — PAIN SCALES - GENERAL
PAINLEVEL_OUTOF10: 0 - NO PAIN

## 2024-12-06 ASSESSMENT — COGNITIVE AND FUNCTIONAL STATUS - GENERAL
DAILY ACTIVITIY SCORE: 24
MOBILITY SCORE: 24
MOBILITY SCORE: 24
DAILY ACTIVITIY SCORE: 24

## 2024-12-06 ASSESSMENT — PAIN - FUNCTIONAL ASSESSMENT
PAIN_FUNCTIONAL_ASSESSMENT: 0-10

## 2024-12-06 NOTE — PROGRESS NOTES
Nephrology Consult Progress Note    Admit Date: 12/4/2024    Interval history:  Fells better     CURRENT MEDICATIONS:    Current Facility-Administered Medications:     acetaminophen (Tylenol) tablet 650 mg, 650 mg, oral, q4h PRN, Madiha Knapp MD    acetaminophen (Tylenol) tablet 650 mg, 650 mg, oral, q4h PRN, Madiha Knapp MD    amitriptyline (Elavil) tablet 25 mg, 25 mg, oral, Nightly, Madiha Knapp MD, 25 mg at 12/05/24 2048    [Held by provider] chlorthalidone (Hygroton) tablet 25 mg, 25 mg, oral, Daily, Madiha Knapp MD    cholecalciferol (Vitamin D-3) tablet 5,000 Units, 5,000 Units, oral, Daily, Madiha Knapp MD, 5,000 Units at 12/05/24 1008    cyanocobalamin (Vitamin B-12) tablet 100 mcg, 100 mcg, oral, Daily, Sorin Barker MD, 100 mcg at 12/05/24 1016    cyclobenzaprine (Flexeril) tablet 5 mg, 5 mg, oral, Nightly PRN, Madiha Knapp MD    dextrose 50 % injection 12.5 g, 12.5 g, intravenous, q15 min PRN, Madiha Knapp MD    dextrose 50 % injection 25 g, 25 g, intravenous, q15 min PRN, Madiha Knapp MD    diphenhydrAMINE (BENADryl) injection 50 mg, 50 mg, intravenous, q5 min PRN, Armand Groves MD    [Held by provider] enoxaparin (Lovenox) syringe 30 mg, 30 mg, subcutaneous, q24h CHELITA, Madiha Knapp MD, 30 mg at 12/05/24 1008    ferrous sulfate (325 mg ferrous sulfate) tablet 325 mg, 65 mg of iron, oral, BID, Madiha Knapp MD, 325 mg at 12/05/24 1725    glucagon (Glucagen) injection 1 mg, 1 mg, intramuscular, q15 min PRN, Madiha Knapp MD    glucagon (Glucagen) injection 1 mg, 1 mg, intramuscular, q15 min PRN, Madiha Knapp MD    insulin lispro injection 0-10 Units, 0-10 Units, subcutaneous, TID AC, Madiha Knapp MD    [Held by provider] lisinopril tablet 10 mg, 10 mg, oral, Daily, Madiha Knapp MD    melatonin tablet 3 mg, 3 mg, oral, Nightly PRN, Madiha Knapp MD    ondansetron ODT (Zofran-ODT) disintegrating tablet 4 mg, 4 mg, oral, q8h PRN **OR** ondansetron (Zofran) injection 4 mg, 4 mg,  "intravenous, q8h PRN, Madiha Knapp MD    pantoprazole (ProtoNix) injection 40 mg, 40 mg, intravenous, Daily, Sorin Barker MD, 40 mg at 12/06/24 0927    polyethylene glycol (Glycolax, Miralax) packet 17 g, 17 g, oral, Daily, Madiha Knapp MD, 17 g at 12/05/24 1008    potassium chloride CR (Klor-Con M20) ER tablet 40 mEq, 40 mEq, oral, Once, Sorin Barker MD    rosuvastatin (Crestor) tablet 10 mg, 10 mg, oral, Daily, Madiha Knapp MD, 10 mg at 12/05/24 1008       Intake/Output Summary (Last 24 hours) at 12/6/2024 1237  Last data filed at 12/6/2024 0145  Gross per 24 hour   Intake 2795 ml   Output 900 ml   Net 1895 ml       PHYSICAL EXAM:  /60   Pulse 71   Temp 36.7 °C (98.1 °F) (Temporal)   Resp 16   Ht 1.702 m (5' 7\")   Wt 64.9 kg (143 lb)   SpO2 97%   BMI 22.40 kg/m²     Intake/Output Summary (Last 24 hours) at 12/6/2024 1237  Last data filed at 12/6/2024 0145  Gross per 24 hour   Intake 2795 ml   Output 900 ml   Net 1895 ml     Gen: AAO, NAD  Neck: No JVD  Cardiac: RRR  Resp: clear BS  Abd: Soft, non tender, +BS, non distended   Ext: No edema   Neuro: moves 4 ext  Peripheral Pulses: Capillary refill <2secs, strong peripheral pulses.  Skin: Skin color, texture, turgor normal, no suspicious rashes or lesions.    Labs:  Results for orders placed or performed during the hospital encounter of 12/04/24 (from the past 24 hours)   POCT GLUCOSE   Result Value Ref Range    POCT Glucose 82 74 - 99 mg/dL   POCT GLUCOSE   Result Value Ref Range    POCT Glucose 115 (H) 74 - 99 mg/dL   Renal Function Panel   Result Value Ref Range    Glucose 91 74 - 99 mg/dL    Sodium 139 136 - 145 mmol/L    Potassium 3.1 (L) 3.5 - 5.3 mmol/L    Chloride 109 (H) 98 - 107 mmol/L    Bicarbonate 24 21 - 32 mmol/L    Anion Gap 9 (L) 10 - 20 mmol/L    Urea Nitrogen 22 6 - 23 mg/dL    Creatinine 1.76 (H) 0.50 - 1.05 mg/dL    eGFR 30 (L) >60 mL/min/1.73m*2    Calcium 7.8 (L) 8.6 - 10.3 mg/dL    Phosphorus 3.7 2.5 - 4.9 mg/dL    " Albumin 3.2 (L) 3.4 - 5.0 g/dL   CBC   Result Value Ref Range    WBC 4.0 (L) 4.4 - 11.3 x10*3/uL    nRBC 0.0 0.0 - 0.0 /100 WBCs    RBC 2.23 (L) 4.00 - 5.20 x10*6/uL    Hemoglobin 6.1 (LL) 12.0 - 16.0 g/dL    Hematocrit 19.2 (L) 36.0 - 46.0 %    MCV 86 80 - 100 fL    MCH 27.4 26.0 - 34.0 pg    MCHC 31.8 (L) 32.0 - 36.0 g/dL    RDW 13.5 11.5 - 14.5 %    Platelets 184 150 - 450 x10*3/uL   Prepare RBC: 2 Units   Result Value Ref Range    PRODUCT CODE J0294D28     Unit Number [id number]     Unit ABO O     Unit RH POS     XM INTEP COMP     Dispense Status IS     Blood Expiration Date 12/24/2024 11:59:00 PM EST     PRODUCT BLOOD TYPE 5100     UNIT VOLUME 350    POCT GLUCOSE   Result Value Ref Range    POCT Glucose 102 (H) 74 - 99 mg/dL   VERIFY ABO/Rh Group Test   Result Value Ref Range    ABO TYPE O     Rh TYPE POS    Prepare RBC: 1 Units   Result Value Ref Range    PRODUCT CODE J6822B19     Unit Number [id number]     Unit ABO O     Unit RH POS     XM INTEP COMP     Dispense Status XM     Blood Expiration Date 12/24/2024 11:59:00 PM EST     PRODUCT BLOOD TYPE 5100     UNIT VOLUME 350         DATA:   Diagnostic tests reviewed for today's visit:    New labs and imaging   RFP, CBC and urine studies seen    Assessment and Plan:  Pt with h/o CKD stage 3a, T2D, hyperlipidemia, iron deficiency anemia, presented with worsening of anemia found on routine labs.  She found with hemoglobin 7.3. she is just felling weaker and dizzy, lost wt this year on ozempic, also on lisinopril and metformin. Pt takes 2-4 motrin a day for the last 1-2 months or more because of hip pain,  - VICKI on CKD stage 3a: baseline Scr 1.2, most likely due to some volume contraction and NSAID use   Lowering Scr with volume expansion   No rash/fever/eosinophilia, no obstruction by US, bland UA w/o protein, recent   Normal complements, rest VICKI work up pending   HTN: controlled  Mild hypokalemia  No evidence of TMA  Non renal anemia, iron  deficient , Hb <7, had iv iron yesterday     PLAN:  - ivf till today, will get blood transfusion. Avoid NSAID use, keeping diuretics and lisinopril on hold, following labs    Will continue to follow.     Signature: Armand Groves MD

## 2024-12-06 NOTE — PROGRESS NOTES
Monica Trotter is a 74 y.o. female on day 2 of admission presenting with VICKI (acute kidney injury) (CMS-[facility]).      Subjective   Pt seen this morning, on room air, feels ok, no sob or cp, afebrile, no overnight events reported. Son bedside.        Objective     Last Recorded Vitals  /59 (BP Location: Left arm, Patient Position: Lying)   Pulse 73   Temp 36.5 °C (97.7 °F) (Temporal)   Resp 18   Wt 64.9 kg (143 lb)   SpO2 94%   Intake/Output last 3 Shifts:    Intake/Output Summary (Last 24 hours) at 12/6/2024 1104  Last data filed at 12/6/2024 0145  Gross per 24 hour   Intake 2795 ml   Output 1700 ml   Net 1095 ml       Admission Weight  Weight: 64.9 kg (143 lb) (12/04/24 1357)    Daily Weight  12/04/24 : 64.9 kg (143 lb)    Image Results  ECG 12 lead  Sinus rhythm with occasional Premature ventricular complexes  Right bundle branch block  Abnormal ECG  When compared with ECG of 15-MAY-2024 11:51,  Premature ventricular complexes are now Present  Premature atrial complexes are no longer Present  US renal complete  Narrative: Interpreted By:  Shawn Phan,   STUDY:  US RENAL COMPLETE;  12/5/2024 12:35 am      INDICATION:  Signs/Symptoms:VICKI.      COMPARISON:  1/9/2023      ACCESSION NUMBER(S):  [id number]      ORDERING CLINICIAN:  MAITE ALTAMIRANO      TECHNIQUE:  Multiple sonographic grayscale and color images of the kidneys were  performed.      FINDINGS:  The right kidney measures 10.2 cm in length. The left kidney measures  9.9 cm in length. There are left renal cysts with largest measuring  3.6 cm and 1.9 cm. No significant left or right hydronephrosis.      Urinary bladder is underdistended and not well evaluated.      Impression: No significant hydronephrosis.      Left renal cysts.      MACRO:  None      Signed by: Shawn Phan 12/5/2024 1:05 AM  Dictation workstation:   [id number]      Physical Exam  Constitutional:       Appearance: Normal appearance.   Cardiovascular:      Rate and Rhythm: Normal  rate and regular rhythm.   Pulmonary:      Effort: Pulmonary effort is normal.      Breath sounds: Normal breath sounds.   Abdominal:      General: Abdomen is flat. Bowel sounds are normal.      Palpations: Abdomen is soft.   Musculoskeletal:      Cervical back: Normal range of motion.   Skin:     General: Skin is warm.   Neurological:      General: No focal deficit present.      Mental Status: She is alert and oriented to person, place, and time. Mental status is at baseline.   Psychiatric:         Mood and Affect: Mood normal.         Behavior: Behavior normal.         Thought Content: Thought content normal.         Judgment: Judgment normal.         Relevant Results      Assessment & Plan  RAJAN (acute kidney injury) (CMS-[facility])      12/6:  H/h dropped, no BM overnight, suspect some hemodilution from ivf... transfuse 1 unit, iv ppi, hold lovenox, endoscopy today, f/up GI.     Her kappa/lambda ration is slightly high but overall supports ckd etiology. Pending her endoscopy she will at the very least need OP f/up with hematology for further evaluation for clonal process.     Rajan/ckd... improved, near baseline... f/up renal.   HypoK... replace.   Cont b12.   Likely replace Vit D at [place], waiting for renal recs.             12/5:  Anemia... likely chronic related to ckd. Fe and folate nl. B12 is borderline so will replace. She is fob+ with frequent nsaid use so GI will take for upper endoscopy tomorrow. She mentions she recently had a hernia procedure.     Rajan/ckd.... ivf, hold home AceI, iv Fe per renal doc, f/up renal workup. She does not have an OP nephrologist. Chlorthalidone also on hold.     Low Vit D... suspect related to ckd... f/up renal.     DM.... A1c 5.8%.... evaluated by endo and plan is to stop home metformin and ozempic. No further DM meds needed.     I don't think she has a UTI, UA is bland, she denies urinary symptoms... stop abx.     Home regimen for chronic conditions  Dvt px with lovenox.  She lives at  home solo, no cane baseline.              Sorin Barker MD

## 2024-12-06 NOTE — ANESTHESIA POSTPROCEDURE EVALUATION
Patient: Monica Trotter    Procedure Summary       Date: 12/06/24 Room / Location: [facility]    Anesthesia Start: 1551 Anesthesia Stop: 1614    Procedure: EGD Diagnosis:       Gastrointestinal hemorrhage, unspecified gastrointestinal hemorrhage type      Anemia, unspecified type    Scheduled Providers: Martha Sheppard MD; Anton Real MD Responsible Provider: Anton Real MD    Anesthesia Type: MAC ASA Status: 3            Anesthesia Type: MAC    Vitals Value Taken Time   /67 12/06/24 1708   Temp 36.4 °C (97.5 °F) 12/06/24 1608   Pulse 62 12/06/24 1708   Resp 14 12/06/24 1708   SpO2 99 % 12/06/24 1708       Anesthesia Post Evaluation    Patient location during evaluation: PACU  Patient participation: complete - patient participated  Level of consciousness: awake and alert  Pain management: adequate  Airway patency: patent  Cardiovascular status: acceptable and hemodynamically stable  Respiratory status: acceptable, spontaneous ventilation and nonlabored ventilation  Hydration status: acceptable  Postoperative Nausea and Vomiting: none        There were no known notable events for this encounter.

## 2024-12-06 NOTE — ANESTHESIA PREPROCEDURE EVALUATION
Patient: Monica Trotter    Procedure Information       Date/Time: 12/06/24 0700    Scheduled providers: Sigifredo López MD; Martha Sheppard MD    Procedure: EGD    Location: [facility]            Relevant Problems   Cardiac   (+) Abnormal electrocardiography   (+) Benign essential hypertension   (+) Hyperlipidemia   (+) Low-density-lipoid-type (LDL) hyperlipoproteinemia   (+) Right bundle branch block (RBBB)      Neuro   (+) Cervical radiculopathy   (+) Depression, major, single episode, moderate (Multi)   (+) Reactive depression   (+) Sciatica of left side   (+) Situational anxiety      Endocrine   (+) DM retinopathy (Multi)   (+) Diabetes mellitus, type 2 (Multi)   (+) Diabetic neuropathy (Multi)      Hematology   (+) Anemia   (+) Vitamin B12 deficiency anemia      Musculoskeletal   (+) Lumbar degenerative disc disease   (+) Osteoarthrosis   (+) Primary osteoarthritis of both knees      ID   (+) Mycotic toenails      Skin   (+) Skin rash       Clinical information reviewed:   Tobacco  Allergies  Meds   Med Hx  Surg Hx   Fam Hx  Soc Hx         Past Medical History:   Diagnosis Date    Anemia     Arthritis     Cataract     CKD (chronic kidney disease)     HTN (hypertension)     Hyperlipidemia     Type 2 diabetes mellitus     Vitreous degeneration, unspecified eye     Vitreous degeneration      Past Surgical History:   Procedure Laterality Date    CHOLECYSTECTOMY      COLONOSCOPY      ESOPHAGOGASTRODUODENOSCOPY      HERNIA REPAIR  05/02/2024    Robotic right inguinal    HYSTERECTOMY      SHOULDER SURGERY       Social History     Tobacco Use    Smoking status: Never     Passive exposure: Past    Smokeless tobacco: Never   Vaping Use    Vaping status: Never Used   Substance Use Topics    Alcohol use: Not Currently     Comment: occ    Drug use: Not Currently      Current Outpatient Medications   Medication Instructions    acetaminophen (TYLENOL 8 HOUR) 650 mg, oral, Every 8 hours PRN, Do not crush,  "chew, or split.    amitriptyline (ELAVIL) 25 mg, oral, Nightly    chlorthalidone (HYGROTON) 25 mg, oral, Daily    cholecalciferol (VITAMIN D3) 5,000 Units, oral, Daily    cyclobenzaprine (FLEXERIL) 5 mg, oral, Nightly PRN    FreeStyle Lite Strips strip Use to test blood sugars twice daily    inulin (FIBER GUMMIES ORAL) 2 tablets, oral, Daily    lisinopril 10 mg, oral, Daily    metFORMIN (GLUCOPHAGE) 1,000 mg, oral, Daily with breakfast    rosuvastatin (CRESTOR) 40 mg, oral, Daily    semaglutide 0.25 mg, subcutaneous, Once Weekly      Allergies   Allergen Reactions    Sulfur Unknown    Sulfa (Sulfonamide Antibiotics) Unknown and Rash        Chemistry    Lab Results   Component Value Date/Time     12/06/2024 0606    K 3.1 (L) 12/06/2024 0606     (H) 12/06/2024 0606    CO2 24 12/06/2024 0606    BUN 22 12/06/2024 0606    CREATININE 1.76 (H) 12/06/2024 0606    Lab Results   Component Value Date/Time    CALCIUM 7.8 (L) 12/06/2024 0606    ALKPHOS 63 12/04/2024 1630    AST 31 12/04/2024 1630    ALT 26 12/04/2024 1630    BILITOT 0.3 12/04/2024 1630          Lab Results   Component Value Date    HGBA1C 5.8 (H) 12/04/2024     Lab Results   Component Value Date/Time    WBC 4.0 (L) 12/06/2024 0606    HGB 6.1 (LL) 12/06/2024 0606    HCT 19.2 (L) 12/06/2024 0606     12/06/2024 0606     Lab Results   Component Value Date/Time    PROTIME 12.7 12/04/2024 1630    INR 1.1 12/04/2024 1630     No results found for: \"ABORH\"  Encounter Date: 12/04/24   ECG 12 lead   Result Value    Ventricular Rate 99    Atrial Rate 99    DE Interval 118    QRS Duration 122    QT Interval 368    QTC Calculation(Bazett) 472    P Axis 69    R Axis 54    T Axis 34    QRS Count 16    Q Onset 224    P Onset 165    P Offset 217    T Offset 408    QTC Fredericia 434    Narrative    Sinus rhythm with occasional Premature ventricular complexes  Right bundle branch block  Abnormal ECG  When compared with ECG of 15-MAY-2024 11:51,  Premature " ventricular complexes are now Present  Premature atrial complexes are no longer Present     No results found for this or any previous visit from the past 1095 days.     Echo 2/8/2022:  Left Ventricle: The left ventricular systolic function is normal, with an estimated ejection fraction of 60-65%. Estimated left ventricular mass is normal. There are no regional wall motion abnormalities. The left ventricular cavity size is normal. The left ventricular septal wall thickness is mildly increased. There is mildly increased left ventricular posterior wall thickness. Spectral Doppler shows an impaired relaxation pattern of left ventricular diastolic filling.  Left Atrium: The left atrium is upper limits of normal in size.  Right Ventricle: The right ventricle is normal in size. There is normal right ventricular global systolic function.  Right Atrium: The right atrium is normal in size.  Aortic Valve: The aortic valve appears structurally normal. There is mild aortic valve cusp calcification. There is no evidence of aortic valve regurgitation. The peak instantaneous gradient of the aortic valve is 8.4 mmHg. The mean gradient of the aortic valve is 5.4 mmHg.  Mitral Valve: The mitral valve is mildly thickened. There is no evidence of mitral valve regurgitation.  Tricuspid Valve: The tricuspid valve is structurally normal. There is mild tricuspid regurgitation.  Pulmonic Valve: The pulmonic valve is structurally normal. There is mild pulmonic valve regurgitation.  Pericardium: There is no pericardial effusion noted.  Aorta: The aortic root is normal. The Asc Ao is 3.50 cm. The thoracic aorta diam is 3.7 cm.  Pulmonary Artery: The tricuspid regurgitant velocity is 2.98 m/s, and with an estimated right atrial pressure of 3 mmHg, the estimated pulmonary artery pressure is mild to moderately elevated with the RVSP at 42 mmHg.  Systemic Veins: The inferior vena cava appears to be of normal size. There is IVC inspiratory collapse  "greater than 50%.  In comparison to the previous echocardiogram(s): Compared with study from 9/25/2020, there is an increase in RVSP.     CONCLUSIONS:   1. The left ventricular systolic function is normal with a 60-65% estimated ejection fraction.   2. Spectral Doppler shows an impaired relaxation pattern of left ventricular diastolic filling.    Nuclear stress 9/25/2020:  IMPRESSION:  * Normal stress myocardial perfusion imaging without definite  evidence of ischemia or prior infarct.  *Well-maintained left ventricular function with an ejection fraction  of 58%. Ejection fraction is improved in comparison to prior study.  *Normal left ventricular size.    Visit Vitals  BP (!) 125/103   Pulse 75   Temp 36.9 °C (98.4 °F) (Temporal)   Resp 16   Ht 1.702 m (5' 7\")   Wt 64.9 kg (143 lb)   SpO2 98%   BMI 22.40 kg/m²   OB Status Hysterectomy   Smoking Status Never   BSA 1.75 m²     NPO/Void Status  Carbohydrate Drink Given Prior to Surgery? : N  Date of Last Liquid: 12/05/24  Time of Last Liquid: 2350  Date of Last Solid: 12/05/24  Time of Last Solid: 0000  Last Intake Type: Clear fluids  Time of Last Void: 1500        Physical Exam    Airway  Mallampati: III  TM distance: >3 FB  Neck ROM: full     Cardiovascular - normal exam  Rhythm: regular  Rate: normal     Dental    Pulmonary - normal exam     Abdominal - normal exam             Anesthesia Plan    History of general anesthesia?: yes  History of complications of general anesthesia?: no    ASA 3     MAC   (With standard ASA monitoring.)  intravenous induction   Anesthetic plan and risks discussed with patient.    Plan discussed with CRNA and CAA.        "

## 2024-12-06 NOTE — CARE PLAN
The patient's goals for the shift include      The clinical goals for the shift include hds, safety, pain control

## 2024-12-06 NOTE — CARE PLAN
The patient's goals for the shift include      The clinical goals for the shift include Patient will be safe and hemodynamically stable throughout shift      Problem: Pain - Adult  Goal: Verbalizes/displays adequate comfort level or baseline comfort level  Outcome: Progressing     Problem: Safety - Adult  Goal: Free from fall injury  Outcome: Progressing     Problem: Discharge Planning  Goal: Discharge to home or other facility with appropriate resources  Outcome: Progressing     Problem: Chronic Conditions and Co-morbidities  Goal: Patient's chronic conditions and co-morbidity symptoms are monitored and maintained or improved  Outcome: Progressing     Problem: Diabetes  Goal: Achieve decreasing blood glucose levels by end of shift  Outcome: Progressing  Goal: Increase stability of blood glucose readings by end of shift  Outcome: Progressing  Goal: Decrease in ketones present in urine by end of shift  Outcome: Progressing  Goal: Maintain electrolyte levels within acceptable range throughout shift  Outcome: Progressing  Goal: Maintain glucose levels >70mg/dl to <250mg/dl throughout shift  Outcome: Progressing  Goal: No changes in neurological exam by end of shift  Outcome: Progressing  Goal: Learn about and adhere to nutrition recommendations by end of shift  Outcome: Progressing  Goal: Vital signs within normal range for age by end of shift  Outcome: Progressing  Goal: Increase self care and/or family involovement by end of shift  Outcome: Progressing  Goal: Receive DSME education by end of shift  Outcome: Progressing

## 2024-12-06 NOTE — PERIOPERATIVE NURSING NOTE
1608 Arrival to post endo. Drowsy but easily arousable. No complaints. Blood infusing, was started intraprocedure.     1644 Post endo blood sugar 134.    1700 Report called to room 702 nurse.     1718 Transported back to room 702 via stretcher with transported and RN.

## 2024-12-07 VITALS
HEIGHT: 67 IN | DIASTOLIC BLOOD PRESSURE: 65 MMHG | TEMPERATURE: 98.7 F | BODY MASS INDEX: 22.44 KG/M2 | RESPIRATION RATE: 14 BRPM | SYSTOLIC BLOOD PRESSURE: 115 MMHG | WEIGHT: 143 LBS | OXYGEN SATURATION: 97 % | HEART RATE: 62 BPM

## 2024-12-07 LAB
ANION GAP SERPL CALC-SCNC: 8 MMOL/L (ref 10–20)
BUN SERPL-MCNC: 20 MG/DL (ref 6–23)
CALCIUM SERPL-MCNC: 7.9 MG/DL (ref 8.6–10.3)
CHLORIDE SERPL-SCNC: 110 MMOL/L (ref 98–107)
CO2 SERPL-SCNC: 25 MMOL/L (ref 21–32)
CREAT SERPL-MCNC: 1.79 MG/DL (ref 0.5–1.05)
EGFRCR SERPLBLD CKD-EPI 2021: 29 ML/MIN/1.73M*2
ERYTHROCYTE [DISTWIDTH] IN BLOOD BY AUTOMATED COUNT: 14.6 % (ref 11.5–14.5)
GLUCOSE BLD MANUAL STRIP-MCNC: 122 MG/DL (ref 74–99)
GLUCOSE BLD MANUAL STRIP-MCNC: 98 MG/DL (ref 74–99)
GLUCOSE SERPL-MCNC: 110 MG/DL (ref 74–99)
HCT VFR BLD AUTO: 26.3 % (ref 36–46)
HGB BLD-MCNC: 8.6 G/DL (ref 12–16)
MCH RBC QN AUTO: 28.3 PG (ref 26–34)
MCHC RBC AUTO-ENTMCNC: 32.7 G/DL (ref 32–36)
MCV RBC AUTO: 87 FL (ref 80–100)
NRBC BLD-RTO: 0 /100 WBCS (ref 0–0)
PLATELET # BLD AUTO: 194 X10*3/UL (ref 150–450)
POTASSIUM SERPL-SCNC: 3.7 MMOL/L (ref 3.5–5.3)
RBC # BLD AUTO: 3.04 X10*6/UL (ref 4–5.2)
SODIUM SERPL-SCNC: 139 MMOL/L (ref 136–145)
WBC # BLD AUTO: 6.4 X10*3/UL (ref 4.4–11.3)

## 2024-12-07 PROCEDURE — 82947 ASSAY GLUCOSE BLOOD QUANT: CPT

## 2024-12-07 PROCEDURE — 80048 BASIC METABOLIC PNL TOTAL CA: CPT | Performed by: INTERNAL MEDICINE

## 2024-12-07 PROCEDURE — 36415 COLL VENOUS BLD VENIPUNCTURE: CPT | Performed by: INTERNAL MEDICINE

## 2024-12-07 PROCEDURE — 2500000001 HC RX 250 WO HCPCS SELF ADMINISTERED DRUGS (ALT 637 FOR MEDICARE OP): Performed by: HOSPITALIST

## 2024-12-07 PROCEDURE — 2500000001 HC RX 250 WO HCPCS SELF ADMINISTERED DRUGS (ALT 637 FOR MEDICARE OP): Performed by: INTERNAL MEDICINE

## 2024-12-07 PROCEDURE — 85027 COMPLETE CBC AUTOMATED: CPT | Performed by: INTERNAL MEDICINE

## 2024-12-07 PROCEDURE — 2500000002 HC RX 250 W HCPCS SELF ADMINISTERED DRUGS (ALT 637 FOR MEDICARE OP, ALT 636 FOR OP/ED): Performed by: HOSPITALIST

## 2024-12-07 PROCEDURE — 99239 HOSP IP/OBS DSCHRG MGMT >30: CPT | Performed by: INTERNAL MEDICINE

## 2024-12-07 RX ORDER — PNV NO.95/FERROUS FUM/FOLIC AC 28MG-0.8MG
100 TABLET ORAL DAILY
Qty: 30 TABLET | Refills: 1 | Status: SHIPPED | OUTPATIENT
Start: 2024-12-07 | End: 2025-02-05

## 2024-12-07 RX ORDER — PANTOPRAZOLE SODIUM 40 MG/1
40 TABLET, DELAYED RELEASE ORAL DAILY
Qty: 30 TABLET | Refills: 1 | Status: SHIPPED | OUTPATIENT
Start: 2024-12-07 | End: 2025-02-05

## 2024-12-07 RX ORDER — FERROUS SULFATE 325(65) MG
65 TABLET ORAL
Qty: 30 TABLET | Refills: 1 | Status: SHIPPED | OUTPATIENT
Start: 2024-12-07 | End: 2025-02-05

## 2024-12-07 ASSESSMENT — COGNITIVE AND FUNCTIONAL STATUS - GENERAL
MOBILITY SCORE: 24
DAILY ACTIVITIY SCORE: 24

## 2024-12-07 ASSESSMENT — PAIN - FUNCTIONAL ASSESSMENT
PAIN_FUNCTIONAL_ASSESSMENT: 0-10
PAIN_FUNCTIONAL_ASSESSMENT: 0-10

## 2024-12-07 ASSESSMENT — PAIN SCALES - GENERAL: PAINLEVEL_OUTOF10: 0 - NO PAIN

## 2024-12-07 NOTE — CARE PLAN
The patient's goals for the shift include      The clinical goals for the shift include patient to be discharged this shift      Problem: Pain - Adult  Goal: Verbalizes/displays adequate comfort level or baseline comfort level  Outcome: Met     Problem: Safety - Adult  Goal: Free from fall injury  Outcome: Met     Problem: Discharge Planning  Goal: Discharge to home or other facility with appropriate resources  Outcome: Met     Problem: Chronic Conditions and Co-morbidities  Goal: Patient's chronic conditions and co-morbidity symptoms are monitored and maintained or improved  Outcome: Met     Problem: Diabetes  Goal: Achieve decreasing blood glucose levels by end of shift  Outcome: Met

## 2024-12-07 NOTE — CARE PLAN
The patient's goals for the shift include      The clinical goals for the shift include hds, safe and pain controlled

## 2024-12-07 NOTE — DISCHARGE SUMMARY
Discharge Diagnosis  VICKI (acute kidney injury) (CMS-[facility])      Discharge Meds     Medication List      START taking these medications     cyanocobalamin 100 mcg tablet; Commonly known as: Vitamin B-12; Take 1   tablet (100 mcg) by mouth once daily.   ferrous sulfate (325 mg ferrous sulfate) tablet; Take 1 tablet (325 mg)   by mouth once daily with breakfast.   pantoprazole 40 mg EC tablet; Commonly known as: ProtoNix; Take 1 tablet   (40 mg) by mouth once daily. Do not crush, chew, or split.     CONTINUE taking these medications     acetaminophen 650 mg ER tablet; Commonly known as: Tylenol 8 Hour; Take   1 tablet (650 mg) by mouth every 8 hours if needed for mild pain (1 - 3).   Do not crush, chew, or split.   amitriptyline 25 mg tablet; Commonly known as: Elavil; Take 1 tablet (25   mg) by mouth once daily at bedtime.   cholecalciferol 5,000 Units tablet; Commonly known as: Vitamin D3; Take   1 tablet (5,000 Units) by mouth once daily.   FIBER GUMMIES ORAL   rosuvastatin 40 mg tablet; Commonly known as: Crestor; Take 1 tablet (40   mg) by mouth once daily.     STOP taking these medications     chlorthalidone 25 mg tablet; Commonly known as: Hygroton   cyclobenzaprine 5 mg tablet; Commonly known as: Flexeril   FreeStyle Lite Strips strip; Generic drug: blood sugar diagnostic   lisinopril 10 mg tablet   metFORMIN 1,000 mg tablet; Commonly known as: Glucophage   semaglutide 0.25 mg or 0.5 mg (2 mg/3 mL) pen injector       Test Results Pending At Discharge  Pending Labs       Order Current Status    Extra Urine Gray Tube Collected (12/04/24 1630)    ANCA-Associated Vasculitis Profile (ANCA,MPO,PR3) In process    Serum Protein Electrophoresis + Immunofixation In process    Serum Protein Electrophoresis + Immunofixation In process    Urinalysis with Reflex Culture and Microscopic In process            Hospital Course       12/7:  H/h better s/p 2 units.  EGD with non bleeding angioectasias s/p APC.   Will dc with Fe  refill, new PPI, and new b12.   F/up with heme and PCP requested.     Pt clinically and hemodynamically stable, tolerating PO intake and room air.   Instructed to F/U with PCP within 5 days.   She understands and agrees with the dc plan.     More than 30 min spent on dc.         12/6:  H/h dropped, no BM overnight, suspect some hemodilution from ivf... transfuse 1 unit, iv ppi, hold lovenox, endoscopy today, f/up GI.      Her kappa/lambda ration is slightly high but overall supports ckd etiology. Pending her endoscopy she will at the very least need OP f/up with hematology for further evaluation for clonal process.      Rajan/ckd... improved, near baseline... f/up renal.   HypoK... replace.   Cont b12.   Likely replace Vit D at dc, waiting for renal recs.         12/5:  Anemia... likely chronic related to ckd. Fe and folate nl. B12 is borderline so will replace. She is fob+ with frequent nsaid use so GI will take for upper endoscopy tomorrow. She mentions she recently had a hernia procedure.      Rajan/ckd.... ivf, hold home AceI, iv Fe per renal doc, f/up renal workup. She does not have an OP nephrologist. Chlorthalidone also on hold.      Low Vit D... suspect related to ckd... f/up renal.      DM.... A1c 5.8%.... evaluated by endo and plan is to stop home metformin and ozempic. No further DM meds needed.      I don't think she has a UTI, UA is bland, she denies urinary symptoms... stop abx.      Home regimen for chronic conditions  Dvt px with lovenox.  She lives at home solo, no cane baseline.     Pertinent Physical Exam At Time of Discharge  Physical Exam  Constitutional:       Appearance: Normal appearance.   Cardiovascular:      Rate and Rhythm: Normal rate and regular rhythm.   Pulmonary:      Effort: Pulmonary effort is normal.      Breath sounds: Normal breath sounds.   Abdominal:      General: Abdomen is flat. Bowel sounds are normal.      Palpations: Abdomen is soft.   Musculoskeletal:      Cervical back:  "Normal range of motion.   Skin:     General: Skin is warm.   Neurological:      General: No focal deficit present.      Mental Status: She is alert and oriented to person, place, and time. Mental status is at baseline.   Psychiatric:         Mood and Affect: Mood normal.         Behavior: Behavior normal.         Thought Content: Thought content normal.         Judgment: Judgment normal.     /65 (BP Location: Left arm, Patient Position: Lying)   Pulse 62   Temp 37.1 °C (98.7 °F) (Oral)   Resp 14   Ht 1.702 m (5' 7\")   Wt 64.9 kg (143 lb)   SpO2 97%   BMI 22.40 kg/m²       Outpatient Follow-Up  Future Appointments   Date Time Provider Department Center   2/7/2025  1:45 PM Renée Matta OD [id number] [facility]   5/13/2025 11:00 AM JAMISON Ellis-CNP [id number] [facility]   7/17/2025 10:00 AM Consuelo John MD [id number] [facility]         Sorin Barker MD  "

## 2024-12-07 NOTE — NURSING NOTE
Patient given discharge instructions, understood to  meds and did not have any further questions at this time. Patient transported out with son

## 2024-12-08 LAB
ANCA AB PATTERN SER IF-IMP: NORMAL
ANCA IGG TITR SER IF: NORMAL {TITER}
ATRIAL RATE: 99 BPM
MYELOPEROXIDASE AB SER-ACNC: 0 AU/ML (ref 0–19)
P AXIS: 69 DEGREES
P OFFSET: 217 MS
P ONSET: 165 MS
PR INTERVAL: 118 MS
PROTEINASE3 AB SER-ACNC: 1 AU/ML (ref 0–19)
Q ONSET: 224 MS
QRS COUNT: 16 BEATS
QRS DURATION: 122 MS
QT INTERVAL: 368 MS
QTC CALCULATION(BAZETT): 472 MS
QTC FREDERICIA: 434 MS
R AXIS: 54 DEGREES
T AXIS: 34 DEGREES
T OFFSET: 408 MS
VENTRICULAR RATE: 99 BPM

## 2024-12-10 LAB
ALBUMIN: 3.3 G/DL (ref 3.4–5)
ALPHA 1 GLOBULIN: 0.3 G/DL (ref 0.2–0.6)
ALPHA 2 GLOBULIN: 0.6 G/DL (ref 0.4–1.1)
BETA GLOBULIN: 0.6 G/DL (ref 0.5–1.2)
GAMMA GLOBULIN: 0.9 G/DL (ref 0.5–1.4)
IMMUNOFIXATION COMMENT: ABNORMAL
PATH REVIEW - SERUM IMMUNOFIXATION: ABNORMAL
PATH REVIEW-SERUM PROTEIN ELECTROPHORESIS: ABNORMAL
PROTEIN ELECTROPHORESIS COMMENT: ABNORMAL

## 2024-12-11 ENCOUNTER — OFFICE VISIT (OUTPATIENT)
Dept: PRIMARY CARE | Facility: CLINIC | Age: 74
End: 2024-12-11
Payer: MEDICARE

## 2024-12-11 VITALS
HEART RATE: 66 BPM | DIASTOLIC BLOOD PRESSURE: 78 MMHG | WEIGHT: 150 LBS | SYSTOLIC BLOOD PRESSURE: 129 MMHG | BODY MASS INDEX: 23.54 KG/M2 | HEIGHT: 67 IN

## 2024-12-11 DIAGNOSIS — N17.9 AKI (ACUTE KIDNEY INJURY) (CMS-HCC): ICD-10-CM

## 2024-12-11 DIAGNOSIS — I10 BENIGN ESSENTIAL HYPERTENSION: ICD-10-CM

## 2024-12-11 DIAGNOSIS — Z09 HOSPITAL DISCHARGE FOLLOW-UP: Primary | ICD-10-CM

## 2024-12-11 DIAGNOSIS — K92.2 GASTROINTESTINAL HEMORRHAGE, UNSPECIFIED GASTROINTESTINAL HEMORRHAGE TYPE: ICD-10-CM

## 2024-12-11 PROCEDURE — 3048F LDL-C <100 MG/DL: CPT | Performed by: INTERNAL MEDICINE

## 2024-12-11 PROCEDURE — 1111F DSCHRG MED/CURRENT MED MERGE: CPT | Performed by: INTERNAL MEDICINE

## 2024-12-11 PROCEDURE — 3008F BODY MASS INDEX DOCD: CPT | Performed by: INTERNAL MEDICINE

## 2024-12-11 PROCEDURE — G2211 COMPLEX E/M VISIT ADD ON: HCPCS | Performed by: INTERNAL MEDICINE

## 2024-12-11 PROCEDURE — 99215 OFFICE O/P EST HI 40 MIN: CPT | Performed by: INTERNAL MEDICINE

## 2024-12-11 PROCEDURE — 3060F POS MICROALBUMINURIA REV: CPT | Performed by: INTERNAL MEDICINE

## 2024-12-11 PROCEDURE — 3044F HG A1C LEVEL LT 7.0%: CPT | Performed by: INTERNAL MEDICINE

## 2024-12-11 PROCEDURE — 3074F SYST BP LT 130 MM HG: CPT | Performed by: INTERNAL MEDICINE

## 2024-12-11 PROCEDURE — 3078F DIAST BP <80 MM HG: CPT | Performed by: INTERNAL MEDICINE

## 2024-12-11 PROCEDURE — 1036F TOBACCO NON-USER: CPT | Performed by: INTERNAL MEDICINE

## 2024-12-11 ASSESSMENT — LIFESTYLE VARIABLES
HOW OFTEN DO YOU HAVE A DRINK CONTAINING ALCOHOL: MONTHLY OR LESS
HOW OFTEN DO YOU HAVE SIX OR MORE DRINKS ON ONE OCCASION: NEVER
SKIP TO QUESTIONS 9-10: 1
AUDIT-C TOTAL SCORE: 1
HOW MANY STANDARD DRINKS CONTAINING ALCOHOL DO YOU HAVE ON A TYPICAL DAY: 1 OR 2

## 2024-12-11 NOTE — PROGRESS NOTES
"Subjective   Patient ID: Monica Trotter is a 74 y.o. female who presents for post hospital discharge follow up. Admitted to [facility] from 12/4-12/7 for the workup of anemia of 7.3 and VICKI. Iron studies revealed iron deficiency anemia with Iron 39, TIBC 298, % saturation 13%, Ferritin 20. She received 2units of pRBC and IV Iron infusion once. EGD showed non bleeding angiectasias which were ablated.  All the medications were stopped upon discharge (semglutide/metformin/hydrochlorothiazide/lisinopril). Patient was recommended to follow up with Hematologist as outpatient.     Currently states she feels fine. No N/V/hemoptysis, HA, chest pain, abdominal pain, melena/hematochezia, changes to urination or BM. Continues to feel little fatigued.     Objective   /78 (BP Location: Right arm, Patient Position: Sitting, BP Cuff Size: Adult)   Pulse 66   Ht 1.702 m (5' 7\")   Wt 68 kg (150 lb)   BMI 23.49 kg/m²     Physical Exam  Constitutional: Appears stated age. In NAD.   HEENT: NC/AT, EOMI, clear sclera, moist mucous membranes  CV: RRR, No M/R/G  PULM: CTAB, no coughing or wheezing  ABDOMEN: Soft, NT/ND. No TTP. + BSx4.  SKIN: Normal Color, Warm, Dry, No Rashes   EXTREMITIES: Non-Tender, Full ROM, No Pedal Edema  NEURO: A&O x 4, nonfocal neurological exam.  PSYCH: Normal Mood & Behavior    Assessment/Plan     75 y/o female w/ PMH of HTN, HLD, T2DM, iron deficiency anemia, lumbar radicular pain presents for a post hospital discharge follow up.     #Dizziness possibly due to possibly hypoglycemic episodes  #Hx of T2DM, A1C 6.0  - Stop Semaglutide and metformin     #Anemia, likely LAZARO vs other causes  - Iron studies 12/05/24: Iron 39, TIBC 298, % saturation 13%, Ferritin 20  - s/p 2pRBCs and IV iron infusion during hospital admission 12/4-12/7   - s/p EGD 12/6/24: Multiple small angioectasias in the duodenal bulb; no bleeding was observed; the lesion was ablated with argon plasma coagulation using a straight fire probe- no " bleeding seen during treatment   - Stop NSAID use  - Continue Iron supplements with Fibre gummies and continue protonix 40mg daily  - Recheck CBC in a week    #HTN  #BL Leg swelling  - BP in office 129/78  - Stopped chlorthalidone and lisinopril     #CKD stage 3a  #Recent VICKI on CKD likely pre-renal  - baseline Scr 1.2   - Recommended to stop taking NSAIDs and hold BP meds.  - Encouraged fluid intake   - Recheck RFP in a week    #Lumbar radicular pain  - CT that shows severe stenosis, worse at L3-4, L4-5.   - Follows with Pain management- plan for L4-L5 LESI for radicular pain  She does aquatic therapy, yoga, walking on treadmill, stretching as often as possible.   - Continue home exercise regimen  - Recommended to stop taking Motrin    Follow up in 6 weeks.

## 2024-12-11 NOTE — PROGRESS NOTES

## 2024-12-13 ENCOUNTER — PATIENT OUTREACH (OUTPATIENT)
Dept: CARE COORDINATION | Facility: CLINIC | Age: 74
End: 2024-12-13
Payer: MEDICARE

## 2024-12-13 ENCOUNTER — APPOINTMENT (OUTPATIENT)
Dept: RADIOLOGY | Facility: HOSPITAL | Age: 74
End: 2024-12-13
Payer: MEDICARE

## 2024-12-17 ENCOUNTER — TELEPHONE (OUTPATIENT)
Dept: PRIMARY CARE | Facility: CLINIC | Age: 74
End: 2024-12-17

## 2024-12-17 ENCOUNTER — LAB (OUTPATIENT)
Dept: LAB | Facility: LAB | Age: 74
End: 2024-12-17
Payer: MEDICARE

## 2024-12-17 DIAGNOSIS — K92.2 GASTROINTESTINAL HEMORRHAGE, UNSPECIFIED GASTROINTESTINAL HEMORRHAGE TYPE: ICD-10-CM

## 2024-12-17 LAB
ALBUMIN SERPL BCP-MCNC: 3.4 G/DL (ref 3.4–5)
ANION GAP SERPL CALC-SCNC: 11 MMOL/L (ref 10–20)
BUN SERPL-MCNC: 18 MG/DL (ref 6–23)
CALCIUM SERPL-MCNC: 8.2 MG/DL (ref 8.6–10.3)
CHLORIDE SERPL-SCNC: 109 MMOL/L (ref 98–107)
CO2 SERPL-SCNC: 26 MMOL/L (ref 21–32)
CREAT SERPL-MCNC: 1.37 MG/DL (ref 0.5–1.05)
EGFRCR SERPLBLD CKD-EPI 2021: 41 ML/MIN/1.73M*2
ERYTHROCYTE [DISTWIDTH] IN BLOOD BY AUTOMATED COUNT: 15.7 % (ref 11.5–14.5)
GLUCOSE SERPL-MCNC: 108 MG/DL (ref 74–99)
HCT VFR BLD AUTO: 28 % (ref 36–46)
HGB BLD-MCNC: 8.9 G/DL (ref 12–16)
MCH RBC QN AUTO: 29 PG (ref 26–34)
MCHC RBC AUTO-ENTMCNC: 31.8 G/DL (ref 32–36)
MCV RBC AUTO: 91 FL (ref 80–100)
NRBC BLD-RTO: 0 /100 WBCS (ref 0–0)
PHOSPHATE SERPL-MCNC: 3.4 MG/DL (ref 2.5–4.9)
PLATELET # BLD AUTO: 268 X10*3/UL (ref 150–450)
POTASSIUM SERPL-SCNC: 3.9 MMOL/L (ref 3.5–5.3)
RBC # BLD AUTO: 3.07 X10*6/UL (ref 4–5.2)
SODIUM SERPL-SCNC: 142 MMOL/L (ref 136–145)
WBC # BLD AUTO: 4.5 X10*3/UL (ref 4.4–11.3)

## 2024-12-17 PROCEDURE — 80069 RENAL FUNCTION PANEL: CPT

## 2024-12-17 PROCEDURE — 36415 COLL VENOUS BLD VENIPUNCTURE: CPT

## 2024-12-17 PROCEDURE — 85027 COMPLETE CBC AUTOMATED: CPT

## 2024-12-17 NOTE — TELEPHONE ENCOUNTER
Wants to talk to you.   All meds were stopped and patient is having feet and leg swelling.    Wants to discuss what to do

## 2024-12-18 ENCOUNTER — PATIENT OUTREACH (OUTPATIENT)
Dept: CARE COORDINATION | Facility: CLINIC | Age: 74
End: 2024-12-18
Payer: MEDICARE

## 2024-12-18 NOTE — TELEPHONE ENCOUNTER
Spoke with the patient.  Reviewed blood work.  Noticing significant swelling of the legs.  Restart lower dose Chlorthalidone, 12.5 mg daily  Keep legs elevated  Continue with iron supplements.  Don't take Ibuprofen.  May take Tylenol for pain.  Follow up scheduled.

## 2024-12-19 ENCOUNTER — OFFICE VISIT (OUTPATIENT)
Dept: ENDOCRINOLOGY | Facility: CLINIC | Age: 74
End: 2024-12-19
Payer: MEDICARE

## 2024-12-19 VITALS
DIASTOLIC BLOOD PRESSURE: 78 MMHG | WEIGHT: 156 LBS | RESPIRATION RATE: 16 BRPM | HEIGHT: 67 IN | BODY MASS INDEX: 24.48 KG/M2 | HEART RATE: 76 BPM | SYSTOLIC BLOOD PRESSURE: 124 MMHG

## 2024-12-19 DIAGNOSIS — E11.9 TYPE 2 DIABETES MELLITUS WITHOUT COMPLICATION, WITHOUT LONG-TERM CURRENT USE OF INSULIN (MULTI): Primary | ICD-10-CM

## 2024-12-19 DIAGNOSIS — E78.5 HYPERLIPIDEMIA, UNSPECIFIED HYPERLIPIDEMIA TYPE: ICD-10-CM

## 2024-12-19 DIAGNOSIS — I10 HYPERTENSION, UNSPECIFIED TYPE: ICD-10-CM

## 2024-12-19 PROCEDURE — 1159F MED LIST DOCD IN RCRD: CPT | Performed by: INTERNAL MEDICINE

## 2024-12-19 PROCEDURE — 3074F SYST BP LT 130 MM HG: CPT | Performed by: INTERNAL MEDICINE

## 2024-12-19 PROCEDURE — 3008F BODY MASS INDEX DOCD: CPT | Performed by: INTERNAL MEDICINE

## 2024-12-19 PROCEDURE — 99214 OFFICE O/P EST MOD 30 MIN: CPT | Performed by: INTERNAL MEDICINE

## 2024-12-19 PROCEDURE — 1111F DSCHRG MED/CURRENT MED MERGE: CPT | Performed by: INTERNAL MEDICINE

## 2024-12-19 PROCEDURE — 3048F LDL-C <100 MG/DL: CPT | Performed by: INTERNAL MEDICINE

## 2024-12-19 PROCEDURE — 3078F DIAST BP <80 MM HG: CPT | Performed by: INTERNAL MEDICINE

## 2024-12-19 PROCEDURE — 1160F RVW MEDS BY RX/DR IN RCRD: CPT | Performed by: INTERNAL MEDICINE

## 2024-12-19 PROCEDURE — 3060F POS MICROALBUMINURIA REV: CPT | Performed by: INTERNAL MEDICINE

## 2024-12-19 PROCEDURE — 1036F TOBACCO NON-USER: CPT | Performed by: INTERNAL MEDICINE

## 2024-12-19 PROCEDURE — 3044F HG A1C LEVEL LT 7.0%: CPT | Performed by: INTERNAL MEDICINE

## 2024-12-19 ASSESSMENT — ENCOUNTER SYMPTOMS
VOMITING: 0
HEADACHES: 0
FATIGUE: 0
SHORTNESS OF BREATH: 0
NAUSEA: 0
FEVER: 0
DIARRHEA: 0
PALPITATIONS: 0
CHILLS: 0
COUGH: 0

## 2024-12-19 NOTE — PROGRESS NOTES
Endocrinology New Patient Consult  Subjective   Patient ID: Monica Trotter is a 74 y.o. female who presents for Diabetes (Type 2 ). Patient was referred by Libra Ramos MD    PCP: Libra Ramos MD    HPI  74-year-old referred for evaluation of type 2 diabetes by Dr. Ramos.  She has been diabetic for about 35 years.  She has been on metformin alone for most of that time but was put on Ozempic about a year ago.  She has been under tremendous amounts of stress over the last year and a half as her  was very ill and eventually passed away.  She reports that her reaction to stress is not eating so she has lost about 50 pounds over the last year and a half.  Her most recent A1c was 5.8 and earlier this year was in the low fives.  She was recently admitted earlier this month with a GI bleed causing significant anemia as well as acute on chronic renal insufficiency.  The GI bleed was felt likely contributed by NSAID use.  Since being out of the hospital she has been very careful about not taking any pain medication for her arthritis.  She has been doing okay so far.  She has been checking her blood sugars as she has a yoselin and she has been averaging in the low 100s.  She is up-to-date with eye exam and denies any history of diabetic retinopathy or neuropathy.  She had a recent kidney test this week that was back to her baseline.  She is concerned about restarting full dose chlorthalidone.  She feels the half a pill that she was restarted on will not take care of her swelling.    Review of Systems   Constitutional:  Negative for chills, fatigue and fever.   Respiratory:  Negative for cough and shortness of breath.    Cardiovascular:  Negative for chest pain and palpitations.   Gastrointestinal:  Negative for diarrhea, nausea and vomiting.   Neurological:  Negative for headaches.       Patient Active Problem List   Diagnosis    Age-related cataract    Abnormal electrocardiography    Abnormal  mammogram    Low back pain    Age-related nuclear cataract, bilateral    Anemia    Vitamin B12 deficiency anemia    Benign essential hypertension    Bilateral leg and foot pain    Hip pain, acute, left    Pain, joint, shoulder    Breast microcalcifications    Cervical radiculopathy    Chronic pain of both knees    CKD (chronic kidney disease), stage III (Multi)    Diabetes mellitus without complication (Multi)    Diabetes mellitus, type 2 (Multi)    Depression, major, single episode, moderate (Multi)    Elevated d-dimer    Fatigue    Tired    Glaucoma suspect of both eyes    Hair loss    Has daytime drowsiness    Hearing difficulty of both ears    Hyperlipidemia    Low-density-lipoid-type (LDL) hyperlipoproteinemia    Lumbar degenerative disc disease    LVH (left ventricular hypertrophy)    Mycotic toenails    Non-sustained ventricular tachycardia (Multi)    Operculated retinal tear, left eye    Osteoarthrosis    Osteopenia of lumbar spine    Paresthesia of right foot    Post covid-19 condition, unspecified    Reactive depression    Refractive disorder    Right bundle branch block (RBBB)    Sciatica of left side    Skin rash    Stumbling gait    Varicose vein of leg    Vitamin D deficiency    Wears glasses    Adenomatous polyp of colon    Diabetic neuropathy (Multi)    DM retinopathy (Multi)    Hirsutism    Nonscarring hair loss, unspecified    Other seborrheic keratosis    Pain of left thumb    Primary osteoarthritis of both knees    Situational anxiety    Skin changes due to chronic exposure to nonionizing radiation, unspecified    Bilateral dry eyes    Abdominal wall hernia    Unilateral inguinal hernia without obstruction or gangrene, recurrent    Wellness examination    Menopause    Breast cancer screening by mammogram    Combined form of age-related cataract, right eye    Combined form of age-related cataract, left eye    Exotropia, both eyes    Myopia of right eye    Astigmatism of both eyes    Presbyopia     VICKI (acute kidney injury) (CMS-[facility])    Gastrointestinal hemorrhage    Hospital discharge follow-up       Past Medical History:   Diagnosis Date    Anemia     Arthritis     Cataract     CKD (chronic kidney disease)     HTN (hypertension)     Hyperlipidemia     Type 2 diabetes mellitus     Vitreous degeneration, unspecified eye     Vitreous degeneration        Past Surgical History:   Procedure Laterality Date    CHOLECYSTECTOMY      COLONOSCOPY      ESOPHAGOGASTRODUODENOSCOPY      HERNIA REPAIR  05/02/2024    Robotic right inguinal    HYSTERECTOMY      SHOULDER SURGERY          Social History     Tobacco Use   Smoking Status Never    Passive exposure: Past   Smokeless Tobacco Never      Social History     Substance and Sexual Activity   Alcohol Use Not Currently    Comment: occ      Marital status:   Employment: Retired analyst    Family History   Problem Relation Name Age of Onset    No Known Problems Mother      No Known Problems Father          Home Meds:  Current Outpatient Medications   Medication Instructions    acetaminophen (TYLENOL 8 HOUR) 650 mg, oral, Every 8 hours PRN, Do not crush, chew, or split.    amitriptyline (ELAVIL) 25 mg, oral, Nightly    cholecalciferol (VITAMIN D3) 5,000 Units, oral, Daily    cyanocobalamin (VITAMIN B-12) 100 mcg, oral, Daily    ferrous sulfate (325 mg ferrous sulfate) 325 mg, oral, Daily with breakfast    inulin (FIBER GUMMIES ORAL) 2 tablets, Daily    pantoprazole (PROTONIX) 40 mg, oral, Daily, Do not crush, chew, or split.    rosuvastatin (CRESTOR) 40 mg, oral, Daily        Allergies   Allergen Reactions    Sulfur Unknown    Sulfa (Sulfonamide Antibiotics) Unknown and Rash        Objective   Vitals:    12/19/24 1144   BP: 124/78   Pulse: 76   Resp: 16      Vitals:    12/19/24 1144   Weight: 70.8 kg (156 lb)      Body mass index is 24.43 kg/m².   Physical Exam  Constitutional:       Appearance: Normal appearance. She is normal weight.   HENT:      Head: Normocephalic  "and atraumatic.   Neck:      Thyroid: No thyroid mass, thyromegaly or thyroid tenderness.   Cardiovascular:      Rate and Rhythm: Normal rate and regular rhythm.      Heart sounds: No murmur heard.     No gallop.   Pulmonary:      Effort: Pulmonary effort is normal.      Breath sounds: Normal breath sounds.   Abdominal:      Palpations: Abdomen is soft.      Comments: benign   Feet:      Comments: Mild nonpitting edema bilateral ankles  Neurological:      General: No focal deficit present.      Mental Status: She is alert and oriented to person, place, and time.      Deep Tendon Reflexes: Reflexes are normal and symmetric.   Psychiatric:         Behavior: Behavior is cooperative.         Labs:  Lab Results   Component Value Date    HGBA1C 5.8 (H) 12/04/2024    TSH 2.15 12/05/2024    FREET4 1.16 07/07/2020      No results found for: \"PR1\", \"THYROIDPAB\", \"TSI\"     Assessment/Plan   Assessment & Plan  Type 2 diabetes mellitus without complication, without long-term current use of insulin (Multi)         Hypertension, unspecified type         Hyperlipidemia, unspecified hyperlipidemia type    74-year-old here for evaluation of controlled type 2 diabetes currently off metformin and Ozempic also with hypertension and hyperlipidemia.  We discussed her course.  We reviewed her A1c's which have been excellent over the last year since she lost weight.  I think she is completely fine to remain off medication with monitoring of her numbers.  If her sugars start to go up we can of course reevaluate medication additions.  We did discuss her renal insufficiency that required stopping the metformin but it could be restarted later given she is back to her baseline.  As far as the chlorthalidone she is very concerned that she needs the full dose.  I advised her it is okay to start the full dose but I think she should have a repeat kidney test in 2 weeks which I will put in for her.  I also encouraged her to discuss it with her " primary care.  I will see her back in 3 to 4 months but she is to call or message if her sugars are consistently over 150    Electronically signed by:  Mago Evans MD 12/19/24  11:45 AM

## 2024-12-19 NOTE — PATIENT INSTRUCTIONS
Okay to restart chlorthalidone but please recheck blood work in 2 weeks  We will hold off on diabetes medications for now  Please call or message if sugars consistently over 150 and we will discuss readding medication  Follow-up in 3 to 4 months  Please call or message with any concerns or questions

## 2024-12-19 NOTE — ASSESSMENT & PLAN NOTE
74-year-old here for evaluation of controlled type 2 diabetes currently off metformin and Ozempic also with hypertension and hyperlipidemia.  We discussed her course.  We reviewed her A1c's which have been excellent over the last year since she lost weight.  I think she is completely fine to remain off medication with monitoring of her numbers.  If her sugars start to go up we can of course reevaluate medication additions.  We did discuss her renal insufficiency that required stopping the metformin but it could be restarted later given she is back to her baseline.  As far as the chlorthalidone she is very concerned that she needs the full dose.  I advised her it is okay to start the full dose but I think she should have a repeat kidney test in 2 weeks which I will put in for her.  I also encouraged her to discuss it with her primary care.  I will see her back in 3 to 4 months but she is to call or message if her sugars are consistently over 150

## 2024-12-19 NOTE — LETTER
December 19, 2024     Libra Ramos MD  [address]  [facility] [address]  [facility] [address]    Patient: Monica Trotter   YOB: 1950   Date of Visit: 12/19/2024       Dear Dr. Libra Ramos MD:    Thank you for referring Monica Trotter to me for evaluation. Below are my notes for this consultation.  If you have questions, please do not hesitate to call me. I look forward to following your patient along with you.       Sincerely,     Mago Evans MD      CC: No Recipients  ______________________________________________________________________________________    Endocrinology New Patient Consult  Subjective  Patient ID: Monica Trotter is a 74 y.o. female who presents for Diabetes (Type 2 ). Patient was referred by Libra Ramos MD    PCP: Libra Ramos MD    HPI  74-year-old referred for evaluation of type 2 diabetes by Dr. Ramos.  She has been diabetic for about 35 years.  She has been on metformin alone for most of that time but was put on Ozempic about a year ago.  She has been under tremendous amounts of stress over the last year and a half as her  was very ill and eventually passed away.  She reports that her reaction to stress is not eating so she has lost about 50 pounds over the last year and a half.  Her most recent A1c was 5.8 and earlier this year was in the low fives.  She was recently admitted earlier this month with a GI bleed causing significant anemia as well as acute on chronic renal insufficiency.  The GI bleed was felt likely contributed by NSAID use.  Since being out of the hospital she has been very careful about not taking any pain medication for her arthritis.  She has been doing okay so far.  She has been checking her blood sugars as she has a yoselin and she has been averaging in the low 100s.  She is up-to-date with eye exam and denies any history of diabetic retinopathy or neuropathy.  She had a recent kidney test this week that was back to her  baseline.  She is concerned about restarting full dose chlorthalidone.  She feels the half a pill that she was restarted on will not take care of her swelling.    Review of Systems   Constitutional:  Negative for chills, fatigue and fever.   Respiratory:  Negative for cough and shortness of breath.    Cardiovascular:  Negative for chest pain and palpitations.   Gastrointestinal:  Negative for diarrhea, nausea and vomiting.   Neurological:  Negative for headaches.       Patient Active Problem List   Diagnosis   • Age-related cataract   • Abnormal electrocardiography   • Abnormal mammogram   • Low back pain   • Age-related nuclear cataract, bilateral   • Anemia   • Vitamin B12 deficiency anemia   • Benign essential hypertension   • Bilateral leg and foot pain   • Hip pain, acute, left   • Pain, joint, shoulder   • Breast microcalcifications   • Cervical radiculopathy   • Chronic pain of both knees   • CKD (chronic kidney disease), stage III (Multi)   • Diabetes mellitus without complication (Multi)   • Diabetes mellitus, type 2 (Multi)   • Depression, major, single episode, moderate (Multi)   • Elevated d-dimer   • Fatigue   • Tired   • Glaucoma suspect of both eyes   • Hair loss   • Has daytime drowsiness   • Hearing difficulty of both ears   • Hyperlipidemia   • Low-density-lipoid-type (LDL) hyperlipoproteinemia   • Lumbar degenerative disc disease   • LVH (left ventricular hypertrophy)   • Mycotic toenails   • Non-sustained ventricular tachycardia (Multi)   • Operculated retinal tear, left eye   • Osteoarthrosis   • Osteopenia of lumbar spine   • Paresthesia of right foot   • Post covid-19 condition, unspecified   • Reactive depression   • Refractive disorder   • Right bundle branch block (RBBB)   • Sciatica of left side   • Skin rash   • Stumbling gait   • Varicose vein of leg   • Vitamin D deficiency   • Wears glasses   • Adenomatous polyp of colon   • Diabetic neuropathy (Multi)   • DM retinopathy (Multi)   •  Hirsutism   • Nonscarring hair loss, unspecified   • Other seborrheic keratosis   • Pain of left thumb   • Primary osteoarthritis of both knees   • Situational anxiety   • Skin changes due to chronic exposure to nonionizing radiation, unspecified   • Bilateral dry eyes   • Abdominal wall hernia   • Unilateral inguinal hernia without obstruction or gangrene, recurrent   • Wellness examination   • Menopause   • Breast cancer screening by mammogram   • Combined form of age-related cataract, right eye   • Combined form of age-related cataract, left eye   • Exotropia, both eyes   • Myopia of right eye   • Astigmatism of both eyes   • Presbyopia   • VICKI (acute kidney injury) (CMS-[facility])   • Gastrointestinal hemorrhage   • Hospital discharge follow-up       Past Medical History:   Diagnosis Date   • Anemia    • Arthritis    • Cataract    • CKD (chronic kidney disease)    • HTN (hypertension)    • Hyperlipidemia    • Type 2 diabetes mellitus    • Vitreous degeneration, unspecified eye     Vitreous degeneration        Past Surgical History:   Procedure Laterality Date   • CHOLECYSTECTOMY     • COLONOSCOPY     • ESOPHAGOGASTRODUODENOSCOPY     • HERNIA REPAIR  05/02/2024    Robotic right inguinal   • HYSTERECTOMY     • SHOULDER SURGERY          Social History     Tobacco Use   Smoking Status Never   • Passive exposure: Past   Smokeless Tobacco Never      Social History     Substance and Sexual Activity   Alcohol Use Not Currently    Comment: occ      Marital status:   Employment: Retired analyst    Family History   Problem Relation Name Age of Onset   • No Known Problems Mother     • No Known Problems Father          Home Meds:  Current Outpatient Medications   Medication Instructions   • acetaminophen (TYLENOL 8 HOUR) 650 mg, oral, Every 8 hours PRN, Do not crush, chew, or split.   • amitriptyline (ELAVIL) 25 mg, oral, Nightly   • cholecalciferol (VITAMIN D3) 5,000 Units, oral, Daily   • cyanocobalamin (VITAMIN B-12) 100  "mcg, oral, Daily   • ferrous sulfate (325 mg ferrous sulfate) 325 mg, oral, Daily with breakfast   • inulin (FIBER GUMMIES ORAL) 2 tablets, Daily   • pantoprazole (PROTONIX) 40 mg, oral, Daily, Do not crush, chew, or split.   • rosuvastatin (CRESTOR) 40 mg, oral, Daily        Allergies   Allergen Reactions   • Sulfur Unknown   • Sulfa (Sulfonamide Antibiotics) Unknown and Rash        Objective  Vitals:    12/19/24 1144   BP: 124/78   Pulse: 76   Resp: 16      Vitals:    12/19/24 1144   Weight: 70.8 kg (156 lb)      Body mass index is 24.43 kg/m².   Physical Exam  Constitutional:       Appearance: Normal appearance. She is normal weight.   HENT:      Head: Normocephalic and atraumatic.   Neck:      Thyroid: No thyroid mass, thyromegaly or thyroid tenderness.   Cardiovascular:      Rate and Rhythm: Normal rate and regular rhythm.      Heart sounds: No murmur heard.     No gallop.   Pulmonary:      Effort: Pulmonary effort is normal.      Breath sounds: Normal breath sounds.   Abdominal:      Palpations: Abdomen is soft.      Comments: benign   Feet:      Comments: Mild nonpitting edema bilateral ankles  Neurological:      General: No focal deficit present.      Mental Status: She is alert and oriented to person, place, and time.      Deep Tendon Reflexes: Reflexes are normal and symmetric.   Psychiatric:         Behavior: Behavior is cooperative.         Labs:  Lab Results   Component Value Date    HGBA1C 5.8 (H) 12/04/2024    TSH 2.15 12/05/2024    FREET4 1.16 07/07/2020      No results found for: \"PR1\", \"THYROIDPAB\", \"TSI\"     Assessment/Plan  Assessment & Plan  Type 2 diabetes mellitus without complication, without long-term current use of insulin (Multi)         Hypertension, unspecified type         Hyperlipidemia, unspecified hyperlipidemia type    74-year-old here for evaluation of controlled type 2 diabetes currently off metformin and Ozempic also with hypertension and hyperlipidemia.  We discussed her course. "  We reviewed her A1c's which have been excellent over the last year since she lost weight.  I think she is completely fine to remain off medication with monitoring of her numbers.  If her sugars start to go up we can of course reevaluate medication additions.  We did discuss her renal insufficiency that required stopping the metformin but it could be restarted later given she is back to her baseline.  As far as the chlorthalidone she is very concerned that she needs the full dose.  I advised her it is okay to start the full dose but I think she should have a repeat kidney test in 2 weeks which I will put in for her.  I also encouraged her to discuss it with her primary care.  I will see her back in 3 to 4 months but she is to call or message if her sugars are consistently over 150    Electronically signed by:  Mago Evans MD 12/19/24  11:45 AM

## 2024-12-20 DIAGNOSIS — N18.31 STAGE 3A CHRONIC KIDNEY DISEASE (MULTI): Primary | ICD-10-CM

## 2025-01-03 ENCOUNTER — PATIENT OUTREACH (OUTPATIENT)
Dept: CARE COORDINATION | Facility: CLINIC | Age: 75
End: 2025-01-03
Payer: MEDICARE

## 2025-01-08 ENCOUNTER — PATIENT OUTREACH (OUTPATIENT)
Dept: CARE COORDINATION | Facility: CLINIC | Age: 75
End: 2025-01-08
Payer: MEDICARE

## 2025-01-17 ENCOUNTER — LAB (OUTPATIENT)
Dept: LAB | Facility: LAB | Age: 75
End: 2025-01-17
Payer: MEDICARE

## 2025-01-17 DIAGNOSIS — N18.31 STAGE 3A CHRONIC KIDNEY DISEASE (MULTI): ICD-10-CM

## 2025-01-17 LAB
ALBUMIN SERPL BCP-MCNC: 4 G/DL (ref 3.4–5)
ANION GAP SERPL CALC-SCNC: 14 MMOL/L (ref 10–20)
APPEARANCE UR: CLEAR
BILIRUB UR STRIP.AUTO-MCNC: NEGATIVE MG/DL
BUN SERPL-MCNC: 26 MG/DL (ref 6–23)
CALCIUM SERPL-MCNC: 9.5 MG/DL (ref 8.6–10.3)
CHLORIDE SERPL-SCNC: 103 MMOL/L (ref 98–107)
CO2 SERPL-SCNC: 27 MMOL/L (ref 21–32)
COLOR UR: ABNORMAL
CREAT SERPL-MCNC: 1.5 MG/DL (ref 0.5–1.05)
CREAT UR-MCNC: 73.2 MG/DL (ref 20–320)
EGFRCR SERPLBLD CKD-EPI 2021: 36 ML/MIN/1.73M*2
ERYTHROCYTE [DISTWIDTH] IN BLOOD BY AUTOMATED COUNT: 15.4 % (ref 11.5–14.5)
GLUCOSE SERPL-MCNC: 137 MG/DL (ref 74–99)
GLUCOSE UR STRIP.AUTO-MCNC: NORMAL MG/DL
HCT VFR BLD AUTO: 29.9 % (ref 36–46)
HGB BLD-MCNC: 9.7 G/DL (ref 12–16)
KETONES UR STRIP.AUTO-MCNC: NEGATIVE MG/DL
LEUKOCYTE ESTERASE UR QL STRIP.AUTO: ABNORMAL
MCH RBC QN AUTO: 28.4 PG (ref 26–34)
MCHC RBC AUTO-ENTMCNC: 32.4 G/DL (ref 32–36)
MCV RBC AUTO: 87 FL (ref 80–100)
NITRITE UR QL STRIP.AUTO: NEGATIVE
NRBC BLD-RTO: 0 /100 WBCS (ref 0–0)
PH UR STRIP.AUTO: 6 [PH]
PHOSPHATE SERPL-MCNC: 4.2 MG/DL (ref 2.5–4.9)
PLATELET # BLD AUTO: 266 X10*3/UL (ref 150–450)
POTASSIUM SERPL-SCNC: 4.5 MMOL/L (ref 3.5–5.3)
PROT UR STRIP.AUTO-MCNC: NEGATIVE MG/DL
PROT UR-ACNC: 10 MG/DL (ref 5–24)
PROT/CREAT UR: 0.14 MG/MG CREAT (ref 0–0.17)
RBC # BLD AUTO: 3.42 X10*6/UL (ref 4–5.2)
RBC # UR STRIP.AUTO: NEGATIVE /UL
RBC #/AREA URNS AUTO: ABNORMAL /HPF
SODIUM SERPL-SCNC: 139 MMOL/L (ref 136–145)
SP GR UR STRIP.AUTO: 1.01
SQUAMOUS #/AREA URNS AUTO: ABNORMAL /HPF
UROBILINOGEN UR STRIP.AUTO-MCNC: NORMAL MG/DL
WBC # BLD AUTO: 5.1 X10*3/UL (ref 4.4–11.3)
WBC #/AREA URNS AUTO: ABNORMAL /HPF

## 2025-01-17 PROCEDURE — 84156 ASSAY OF PROTEIN URINE: CPT

## 2025-01-17 PROCEDURE — 80069 RENAL FUNCTION PANEL: CPT

## 2025-01-17 PROCEDURE — 82043 UR ALBUMIN QUANTITATIVE: CPT

## 2025-01-17 PROCEDURE — 85027 COMPLETE CBC AUTOMATED: CPT

## 2025-01-17 PROCEDURE — 81001 URINALYSIS AUTO W/SCOPE: CPT

## 2025-01-17 PROCEDURE — 82570 ASSAY OF URINE CREATININE: CPT

## 2025-01-18 LAB
CREAT UR-MCNC: 72.1 MG/DL (ref 20–320)
MICROALBUMIN UR-MCNC: 14.9 MG/L
MICROALBUMIN/CREAT UR: 20.7 UG/MG CREAT

## 2025-01-28 NOTE — PROGRESS NOTES
"Nephrology Outpatient New Patient Visit Note    Chief Concern:  CKD    History Of Present Illness  Monica Trotter is a 74 y.o. female  Seen by me at [facility], my last note from 12/6/24 is below:   \"Pt with h/o CKD stage 3a, T2D, hyperlipidemia, iron deficiency anemia, presented with worsening of anemia found on routine labs.  She was found with hemoglobin 7.3. she is just felling weaker and dizzy, lost wt this year on ozempic, also on lisinopril and metformin. Pt takes 2-4 motrin a day for the last 1-2 months or more because of hip pain,  - VICKI on CKD stage 3a: baseline Scr 1.2, most likely due to some volume contraction and NSAID use   Lowering Scr with volume expansion   No rash/fever/eosinophilia, no obstruction by US, bland UA w/o protein, recent   Normal complements, rest VICKI work up pending   HTN: controlled  Mild hypokalemia  No evidence of TMA  Non renal anemia, iron deficient , Hb <7, had iv iron yesterday  PLAN:  - ivf till today, will get blood transfusion. Avoid NSAID use, keeping diuretics and lisinopril on hold, following labs\"    - tonya Scr 2.1, last 1.5 on 1/15/25, eGFR 38 w/BSA  No hematuria, uPCR 0.14, uACR 20, Hb up to 9.7, vit D 19  Negative VICKI work up at [facility]  On chlorthalidone, iron, crestor  She was on lisinopril till hospital admission and stopped because of VICKI  She fells OK      Past Medical History  She has a past medical history of Anemia, Arthritis, Cataract, CKD (chronic kidney disease), HTN (hypertension), Hyperlipidemia, Type 2 diabetes mellitus, and Vitreous degeneration, unspecified eye.  Patient Active Problem List   Diagnosis    Age-related cataract    Abnormal electrocardiography    Abnormal mammogram    Low back pain    Age-related nuclear cataract, bilateral    Anemia    Vitamin B12 deficiency anemia    Benign essential hypertension    Bilateral leg and foot pain    Hip pain, acute, left    Pain, joint, shoulder    Breast microcalcifications    Cervical radiculopathy    " Chronic pain of both knees    CKD (chronic kidney disease), stage III (Multi)    Diabetes mellitus without complication (Multi)    Diabetes mellitus, type 2 (Multi)    Depression, major, single episode, moderate (Multi)    Elevated d-dimer    Fatigue    Tired    Glaucoma suspect of both eyes    Hair loss    Has daytime drowsiness    Hearing difficulty of both ears    Hyperlipidemia    Low-density-lipoid-type (LDL) hyperlipoproteinemia    Lumbar degenerative disc disease    LVH (left ventricular hypertrophy)    Mycotic toenails    Non-sustained ventricular tachycardia (Multi)    Operculated retinal tear, left eye    Osteoarthrosis    Osteopenia of lumbar spine    Paresthesia of right foot    Post covid-19 condition, unspecified    Reactive depression    Refractive disorder    Right bundle branch block (RBBB)    Sciatica of left side    Skin rash    Stumbling gait    Varicose vein of leg    Vitamin D deficiency    Wears glasses    Adenomatous polyp of colon    Diabetic neuropathy (Multi)    DM retinopathy (Multi)    Hirsutism    Nonscarring hair loss, unspecified    Other seborrheic keratosis    Pain of left thumb    Primary osteoarthritis of both knees    Situational anxiety    Skin changes due to chronic exposure to nonionizing radiation, unspecified    Bilateral dry eyes    Abdominal wall hernia    Unilateral inguinal hernia without obstruction or gangrene, recurrent    Wellness examination    Menopause    Breast cancer screening by mammogram    Combined form of age-related cataract, right eye    Combined form of age-related cataract, left eye    Exotropia, both eyes    Myopia of right eye    Astigmatism of both eyes    Presbyopia    VICKI (acute kidney injury) (CMS-[facility])    Gastrointestinal hemorrhage    Hospital discharge follow-up       Surgical History  She has a past surgical history that includes Colonoscopy; Hysterectomy; Cholecystectomy; Shoulder surgery; Esophagogastroduodenoscopy; and Hernia repair  (05/02/2024).     Social History  She reports that she has never smoked. She has been exposed to tobacco smoke. She has never used smokeless tobacco. She reports that she does not currently use alcohol. She reports that she does not currently use drugs.    Family History  Family History   Problem Relation Name Age of Onset    No Known Problems Mother      No Known Problems Father          Allergies  Sulfur and Sulfa (sulfonamide antibiotics)    Review of Systems  A 12-point review of systems was performed and noted be negative except for that which was mentioned in the history of present illness     Last Recorded Vitals  /72 (BP Location: Right arm, Patient Position: Sitting, BP Cuff Size: Adult)   Pulse 70   Temp 36.6 °C (97.9 °F) (Temporal)   SpO2 96%       Physical Exam:  Constitutional:  No acute distress  Respiration:  Breathing comfortably, no stridor  Cardiovascular:  No clubbing/cyanosis/edema in hands  Eyes:  EOM intact, sclera normal  Neuro:  Alert and oriented times 3, Cranial nerves II-XII grossly intact and symmetric bilaterally. No asterixis  Head and Face:  Symmetric facial features, no masses or lesions, sinuses non-tender to palpation  Neck/Lymph:  No LAD, no thyroid masses, trachea midline  Skin:  skin is without visible rash  Psych:  Alert and oriented with appropriate mood and affect      Medications:  Medication Documentation Review Audit       Reviewed by Mago Evans MD (Physician) on 12/19/24 at 1257      Medication Order Taking? Sig Documenting Provider Last Dose Status   acetaminophen (Tylenol 8 Hour) 650 mg ER tablet [id number] No Take 1 tablet (650 mg) by mouth every 8 hours if needed for mild pain (1 - 3). Do not crush, chew, or split.   Patient not taking: Reported on 12/19/2024    Libra Ramos MD Unknown Active   amitriptyline (Elavil) 25 mg tablet [id number] Yes Take 1 tablet (25 mg) by mouth once daily at bedtime. Zandra Michele MD Unknown Active   cholecalciferol  (Vitamin D3) 5,000 Units tablet [id number] Yes Take 1 tablet (5,000 Units) by mouth once daily. Znadra Michele MD Unknown Active   cyanocobalamin (Vitamin B-12) 100 mcg tablet [id number] Yes Take 1 tablet (100 mcg) by mouth once daily. Sorin Barker MD  Active   ferrous sulfate, 325 mg ferrous sulfate, tablet [id number] Yes Take 1 tablet (325 mg) by mouth once daily with breakfast. Sorin Barker MD  Active   inulin (FIBER GUMMIES ORAL) [id number] Yes Take 2 tablets by mouth once daily. Av Rose MD Unknown Active   pantoprazole (ProtoNix) 40 mg EC tablet [id number] Yes Take 1 tablet (40 mg) by mouth once daily. Do not crush, chew, or split. Sorin Barker MD  Active   rosuvastatin (Crestor) 40 mg tablet [id number] Yes Take 1 tablet (40 mg) by mouth once daily. Zandra Michele MD Unknown Active                    Relevant Results Recent labs reviewed in the EMR.  Lab Results   Component Value Date    HGB 9.7 (L) 01/17/2025    HGB 8.9 (L) 12/17/2024    HGB 8.6 (L) 12/07/2024    MCV 87 01/17/2025    MCV 91 12/17/2024    MCV 87 12/07/2024     01/17/2025     12/17/2024     12/07/2024       Lab Results   Component Value Date    FERRITIN 20 12/04/2024    IRON 39 12/05/2024       Lab Results   Component Value Date     01/17/2025    K 4.5 01/17/2025     01/17/2025    BUN 26 (H) 01/17/2025    CREATININE 1.50 (H) 01/17/2025         Imaging:  I personally reviewed and this is my impression:        Assessment/Plan   1. Stage 3b chronic kidney disease (Multi) (Primary)  - slowly improving kidney function after VICKI, still not quite at baseline but will resume lisinopril, to get repeat labs before next appt in 3 months   - Parathyroid Hormone, Intact; Future  - Renal Function Panel; Future  - CBC and Auto Differential; Future  - lisinopril 10 mg tablet; Take 1 tablet (10 mg) by mouth once daily.  Dispense: 30 tablet; Refill: 11  - Follow Up In Nephrology; Future    2. Essential  hypertension  - suboptimal control, restart lisinopril  - lisinopril 10 mg tablet; Take 1 tablet (10 mg) by mouth once daily.  Dispense: 30 tablet; Refill: 11     Armand Groves MD  Division of Nephrology and Hypertension

## 2025-01-29 ENCOUNTER — OFFICE VISIT (OUTPATIENT)
Dept: NEPHROLOGY | Facility: CLINIC | Age: 75
End: 2025-01-29
Payer: MEDICARE

## 2025-01-29 ENCOUNTER — APPOINTMENT (OUTPATIENT)
Dept: PRIMARY CARE | Facility: CLINIC | Age: 75
End: 2025-01-29
Payer: MEDICARE

## 2025-01-29 VITALS
DIASTOLIC BLOOD PRESSURE: 72 MMHG | HEART RATE: 70 BPM | SYSTOLIC BLOOD PRESSURE: 145 MMHG | TEMPERATURE: 97.9 F | OXYGEN SATURATION: 96 %

## 2025-01-29 VITALS
HEIGHT: 67 IN | BODY MASS INDEX: 25.11 KG/M2 | WEIGHT: 160 LBS | SYSTOLIC BLOOD PRESSURE: 143 MMHG | HEART RATE: 75 BPM | DIASTOLIC BLOOD PRESSURE: 73 MMHG

## 2025-01-29 DIAGNOSIS — N18.30 STAGE 3 CHRONIC KIDNEY DISEASE, UNSPECIFIED WHETHER STAGE 3A OR 3B CKD (MULTI): ICD-10-CM

## 2025-01-29 DIAGNOSIS — E11.9 TYPE 2 DIABETES MELLITUS WITHOUT COMPLICATION, WITHOUT LONG-TERM CURRENT USE OF INSULIN (MULTI): ICD-10-CM

## 2025-01-29 DIAGNOSIS — I10 BENIGN ESSENTIAL HYPERTENSION: ICD-10-CM

## 2025-01-29 DIAGNOSIS — E78.5 HYPERLIPIDEMIA, UNSPECIFIED HYPERLIPIDEMIA TYPE: ICD-10-CM

## 2025-01-29 DIAGNOSIS — N18.32 STAGE 3B CHRONIC KIDNEY DISEASE (MULTI): Primary | ICD-10-CM

## 2025-01-29 DIAGNOSIS — D64.9 ANEMIA, UNSPECIFIED TYPE: ICD-10-CM

## 2025-01-29 DIAGNOSIS — E78.00 PURE HYPERCHOLESTEROLEMIA: Primary | ICD-10-CM

## 2025-01-29 DIAGNOSIS — M54.12 CERVICAL RADICULOPATHY: ICD-10-CM

## 2025-01-29 DIAGNOSIS — I10 ESSENTIAL HYPERTENSION: ICD-10-CM

## 2025-01-29 DIAGNOSIS — E55.9 VITAMIN D DEFICIENCY: ICD-10-CM

## 2025-01-29 DIAGNOSIS — E55.9 VITAMIN D INSUFFICIENCY: ICD-10-CM

## 2025-01-29 DIAGNOSIS — K92.2 GASTROINTESTINAL HEMORRHAGE, UNSPECIFIED GASTROINTESTINAL HEMORRHAGE TYPE: ICD-10-CM

## 2025-01-29 PROCEDURE — 1036F TOBACCO NON-USER: CPT | Performed by: INTERNAL MEDICINE

## 2025-01-29 PROCEDURE — 4010F ACE/ARB THERAPY RXD/TAKEN: CPT | Performed by: INTERNAL MEDICINE

## 2025-01-29 PROCEDURE — 99214 OFFICE O/P EST MOD 30 MIN: CPT | Performed by: INTERNAL MEDICINE

## 2025-01-29 PROCEDURE — 3061F NEG MICROALBUMINURIA REV: CPT | Performed by: INTERNAL MEDICINE

## 2025-01-29 PROCEDURE — 3078F DIAST BP <80 MM HG: CPT | Performed by: INTERNAL MEDICINE

## 2025-01-29 PROCEDURE — 3008F BODY MASS INDEX DOCD: CPT | Performed by: INTERNAL MEDICINE

## 2025-01-29 PROCEDURE — 3077F SYST BP >= 140 MM HG: CPT | Performed by: INTERNAL MEDICINE

## 2025-01-29 PROCEDURE — G2211 COMPLEX E/M VISIT ADD ON: HCPCS | Performed by: INTERNAL MEDICINE

## 2025-01-29 RX ORDER — CHLORTHALIDONE 25 MG/1
25 TABLET ORAL DAILY
Qty: 60 TABLET | Refills: 1 | Status: SHIPPED | OUTPATIENT
Start: 2025-01-29

## 2025-01-29 RX ORDER — CHLORTHALIDONE 25 MG/1
25 TABLET ORAL DAILY
COMMUNITY
End: 2025-01-29 | Stop reason: SDUPTHER

## 2025-01-29 RX ORDER — PANTOPRAZOLE SODIUM 40 MG/1
40 TABLET, DELAYED RELEASE ORAL DAILY
Qty: 30 TABLET | Refills: 1 | Status: SHIPPED | OUTPATIENT
Start: 2025-01-29 | End: 2025-03-30

## 2025-01-29 RX ORDER — PNV NO.95/FERROUS FUM/FOLIC AC 28MG-0.8MG
100 TABLET ORAL DAILY
Qty: 30 TABLET | Refills: 1 | Status: SHIPPED | OUTPATIENT
Start: 2025-01-29 | End: 2025-03-30

## 2025-01-29 RX ORDER — LISINOPRIL 10 MG/1
10 TABLET ORAL DAILY
Qty: 30 TABLET | Refills: 11 | Status: SHIPPED | OUTPATIENT
Start: 2025-01-29 | End: 2026-01-29

## 2025-01-29 RX ORDER — ACETAMINOPHEN 500 MG
5000 TABLET ORAL DAILY
Qty: 90 TABLET | Refills: 3 | Status: SHIPPED | OUTPATIENT
Start: 2025-01-29

## 2025-01-29 RX ORDER — AMITRIPTYLINE HYDROCHLORIDE 25 MG/1
25 TABLET, FILM COATED ORAL NIGHTLY
Qty: 90 TABLET | Refills: 3 | Status: SHIPPED | OUTPATIENT
Start: 2025-01-29

## 2025-01-29 RX ORDER — FERROUS SULFATE 325(65) MG
65 TABLET ORAL
Qty: 30 TABLET | Refills: 1 | Status: SHIPPED | OUTPATIENT
Start: 2025-01-29 | End: 2025-03-30

## 2025-01-29 RX ORDER — ROSUVASTATIN CALCIUM 40 MG/1
40 TABLET, COATED ORAL DAILY
Qty: 90 TABLET | Refills: 3 | Status: SHIPPED | OUTPATIENT
Start: 2025-01-29

## 2025-01-29 ASSESSMENT — LIFESTYLE VARIABLES
AUDIT-C TOTAL SCORE: 1
SKIP TO QUESTIONS 9-10: 1
HOW OFTEN DO YOU HAVE SIX OR MORE DRINKS ON ONE OCCASION: NEVER
HOW OFTEN DO YOU HAVE A DRINK CONTAINING ALCOHOL: MONTHLY OR LESS
HOW MANY STANDARD DRINKS CONTAINING ALCOHOL DO YOU HAVE ON A TYPICAL DAY: 1 OR 2

## 2025-01-29 NOTE — PROGRESS NOTES
Subjective   Ms Trotter is a 75 y/o female w/ PMH of HTN, HLD, T2DM, iron deficiency anemia, lumbar radicular pain presents for a follow up.   States she still has mild dizzy spells when she on treadmill, but no associated falls/ LOC/ light headedness. Has been taking iron supplements, tumeric, B12 and Vitamin d supplements. States her BG at home sometimes spikes in 180s-200s and is worried about her DM. Her A1C was 5.8 last month. Her BL leg edema has improved using leg compression devices as well as restarting chlorthalidone. Otherwise, no acute complaints.    Objective   There were no vitals taken for this visit.    Physical Exam  Constitutional: Appears stated age. In NAD.   HEENT: NC/AT, EOMI, clear sclera, moist mucous membranes  CV: RRR, No M/R/G  PULM: CTAB, no coughing or wheezing  ABDOMEN: Soft, NT/ND. No TTP. + BSx4.  SKIN: Normal Color, Warm, Dry, No Rashes   EXTREMITIES: Non-Tender, Full ROM, No Pedal Edema  NEURO: A&O x 4, nonfocal neurological exam.  PSYCH: Normal Mood & Behavior    Assessment/Plan   Problem List Items Addressed This Visit       Anemia    Benign essential hypertension    CKD (chronic kidney disease), stage III (Multi)    Diabetes mellitus, type 2 (Multi)    Hyperlipidemia - Primary    Vitamin D deficiency     75 y/o female w/ PMH of HTN, HLD, T2DM, iron deficiency anemia, lumbar radicular pain presents for a follow up.       #Anemia, likely LAZARO vs other causes  - Iron studies 12/05/24: Iron 39, TIBC 298, % saturation 13%, Ferritin 20  - s/p 2pRBCs and IV iron infusion during hospital admission 12/4-12/7   - s/p EGD 12/6/24: Multiple small angioectasias in the duodenal bulb; no bleeding was observed; the lesion was ablated with argon plasma coagulation using a straight fire probe- no bleeding seen during treatment   - Stop NSAID use  - Continue Iron supplements with Fibre gummies and continue protonix 40mg daily  - Continue B12 supplement daily     #HTN  #BL Leg swelling  - BP in office  (143/73). BP at home ranges from 140-150/70-80.  - Previously on Lisinopril. However held due to dizzy spells and worsening kidney function.  - Restarted chlorthalidone at lower dose 12.5mg daily   - Consider starting on Jardiance for BP control   - Recommended to use elevate legs  - Follows with Nephrology for BP and CKD management    #HLD, at goal  - Lipid panel (12/2024): LDL 34, HDL 50.5, chol 99, TG 72  - Continue rosuvastatin 40mg at bedtime     #CKD stage 3a  #Recent VICKI on CKD likely pre-renal  - baseline Scr 1.2 - 1.3  - Recommended to stop taking NSAIDs and hold BP meds.  - Encouraged fluid intake      #Hx of T2DM, A1C improved to 5.8% (12/2024)  #Hx of significant weight loss on DM medications [improving]  #Dizziness possibly due to possibly hypoglycemic episodes [resolved]  - Stopped Semaglutide and metformin previously    #Lumbar radicular pain  - CT that shows severe stenosis, worse at L3-4, L4-5.   - Follows with Pain management- plan for L4-L5 LESI for radicular pain  She does aquatic therapy, yoga, walking on treadmill, stretching as often as possible.   - Continue home exercise regimen  - Recommended to stop taking Motrin due to suspected GIB previously from AVMs  - Continue Amitriptyline 25mg nightly    #Vitamin D deficiency  - Vitamin D level 16 (12/2024)  - Continue Vitamin D 5000u daily    #Health Maintenance  - Colon cancer screening: Last colonoscopy 4/2022 showed one diminutive polyp in the ascending colon, removed. Next due in 5 years.  - Mammogram: will provide new requisition next visit    Follow up in April 2025

## 2025-01-29 NOTE — ADDENDUM NOTE
Addended by: PRICILLA RIOS on: 1/29/2025 10:48 AM     Modules accepted: Level of Service     [Hyperlipidemia] : hyperlipidemia [Hypertension] : hypertension

## 2025-02-07 ENCOUNTER — APPOINTMENT (OUTPATIENT)
Dept: OPHTHALMOLOGY | Facility: CLINIC | Age: 75
End: 2025-02-07
Payer: MEDICARE

## 2025-02-20 ENCOUNTER — APPOINTMENT (OUTPATIENT)
Dept: HEMATOLOGY/ONCOLOGY | Facility: HOSPITAL | Age: 75
End: 2025-02-20
Payer: MEDICARE

## 2025-03-12 ENCOUNTER — HOSPITAL ENCOUNTER (OUTPATIENT)
Dept: RADIOLOGY | Facility: CLINIC | Age: 75
Discharge: HOME | End: 2025-03-12
Payer: MEDICARE

## 2025-03-12 DIAGNOSIS — Z12.31 BREAST CANCER SCREENING BY MAMMOGRAM: ICD-10-CM

## 2025-03-12 PROCEDURE — 77063 BREAST TOMOSYNTHESIS BI: CPT

## 2025-03-14 ENCOUNTER — APPOINTMENT (OUTPATIENT)
Dept: OPHTHALMOLOGY | Facility: CLINIC | Age: 75
End: 2025-03-14
Payer: MEDICARE

## 2025-04-01 DIAGNOSIS — E11.9 TYPE 2 DIABETES MELLITUS WITHOUT COMPLICATION, WITHOUT LONG-TERM CURRENT USE OF INSULIN: ICD-10-CM

## 2025-04-01 DIAGNOSIS — D64.9 ANEMIA, UNSPECIFIED TYPE: ICD-10-CM

## 2025-04-01 DIAGNOSIS — I10 BENIGN ESSENTIAL HYPERTENSION: ICD-10-CM

## 2025-04-01 DIAGNOSIS — N18.30 STAGE 3 CHRONIC KIDNEY DISEASE, UNSPECIFIED WHETHER STAGE 3A OR 3B CKD (MULTI): ICD-10-CM

## 2025-04-06 DIAGNOSIS — K92.2 GASTROINTESTINAL HEMORRHAGE, UNSPECIFIED GASTROINTESTINAL HEMORRHAGE TYPE: ICD-10-CM

## 2025-04-07 RX ORDER — PNV NO.95/FERROUS FUM/FOLIC AC 28MG-0.8MG
100 TABLET ORAL DAILY
Qty: 30 TABLET | Refills: 0 | Status: SHIPPED | OUTPATIENT
Start: 2025-04-07

## 2025-04-07 RX ORDER — PANTOPRAZOLE SODIUM 40 MG/1
40 TABLET, DELAYED RELEASE ORAL DAILY
Qty: 30 TABLET | Refills: 0 | Status: SHIPPED | OUTPATIENT
Start: 2025-04-07

## 2025-04-08 LAB
ALBUMIN SERPL-MCNC: 4.3 G/DL (ref 3.6–5.1)
ALBUMIN/CREAT UR: 14 MG/G CREAT
BUN SERPL-MCNC: 23 MG/DL (ref 7–25)
BUN/CREAT SERPL: 15 (CALC) (ref 6–22)
CALCIUM SERPL-MCNC: 9 MG/DL (ref 8.6–10.4)
CHLORIDE SERPL-SCNC: 104 MMOL/L (ref 98–110)
CO2 SERPL-SCNC: 24 MMOL/L (ref 20–32)
CREAT SERPL-MCNC: 1.49 MG/DL (ref 0.6–1)
CREAT UR-MCNC: 65 MG/DL (ref 20–275)
EGFRCR SERPLBLD CKD-EPI 2021: 37 ML/MIN/1.73M2
ERYTHROCYTE [DISTWIDTH] IN BLOOD BY AUTOMATED COUNT: 13.7 % (ref 11–15)
EST. AVERAGE GLUCOSE BLD GHB EST-MCNC: 160 MG/DL
EST. AVERAGE GLUCOSE BLD GHB EST-SCNC: 8.9 MMOL/L
FERRITIN SERPL-MCNC: 24 NG/ML (ref 16–288)
GLUCOSE SERPL-MCNC: 113 MG/DL (ref 65–99)
HBA1C MFR BLD: 7.2 % OF TOTAL HGB
HCT VFR BLD AUTO: 32.9 % (ref 35–45)
HGB BLD-MCNC: 10.7 G/DL (ref 11.7–15.5)
IRON SATN MFR SERPL: 20 % (CALC) (ref 16–45)
IRON SERPL-MCNC: 63 MCG/DL (ref 45–160)
MCH RBC QN AUTO: 28.5 PG (ref 27–33)
MCHC RBC AUTO-ENTMCNC: 32.5 G/DL (ref 32–36)
MCV RBC AUTO: 87.7 FL (ref 80–100)
MICROALBUMIN UR-MCNC: 0.9 MG/DL
PHOSPHATE SERPL-MCNC: 4.5 MG/DL (ref 2.1–4.3)
PLATELET # BLD AUTO: 224 THOUSAND/UL (ref 140–400)
PMV BLD REES-ECKER: 9.7 FL (ref 7.5–12.5)
POTASSIUM SERPL-SCNC: 4.2 MMOL/L (ref 3.5–5.3)
RBC # BLD AUTO: 3.75 MILLION/UL (ref 3.8–5.1)
SODIUM SERPL-SCNC: 137 MMOL/L (ref 135–146)
TIBC SERPL-MCNC: 320 MCG/DL (CALC) (ref 250–450)
WBC # BLD AUTO: 3.8 THOUSAND/UL (ref 3.8–10.8)

## 2025-04-09 LAB — VIT B12 SERPL-MCNC: 255 PG/ML (ref 200–1100)

## 2025-04-10 ENCOUNTER — CLINICAL SUPPORT (OUTPATIENT)
Dept: PRIMARY CARE | Facility: CLINIC | Age: 75
End: 2025-04-10
Payer: MEDICARE

## 2025-04-10 DIAGNOSIS — D51.9 ANEMIA DUE TO VITAMIN B12 DEFICIENCY, UNSPECIFIED B12 DEFICIENCY TYPE: ICD-10-CM

## 2025-04-10 DIAGNOSIS — E53.8 B12 DEFICIENCY: ICD-10-CM

## 2025-04-10 PROCEDURE — 96372 THER/PROPH/DIAG INJ SC/IM: CPT | Performed by: INTERNAL MEDICINE

## 2025-04-10 RX ORDER — CYANOCOBALAMIN 1000 UG/ML
1000 INJECTION, SOLUTION INTRAMUSCULAR; SUBCUTANEOUS ONCE
Status: COMPLETED | OUTPATIENT
Start: 2025-04-10 | End: 2025-04-10

## 2025-04-10 RX ADMIN — CYANOCOBALAMIN 1000 MCG: 1000 INJECTION, SOLUTION INTRAMUSCULAR; SUBCUTANEOUS at 09:06

## 2025-04-23 ENCOUNTER — OFFICE VISIT (OUTPATIENT)
Dept: PRIMARY CARE | Facility: CLINIC | Age: 75
End: 2025-04-23
Payer: MEDICARE

## 2025-04-23 ENCOUNTER — APPOINTMENT (OUTPATIENT)
Dept: NEPHROLOGY | Facility: CLINIC | Age: 75
End: 2025-04-23
Payer: MEDICARE

## 2025-04-23 VITALS
OXYGEN SATURATION: 96 % | SYSTOLIC BLOOD PRESSURE: 135 MMHG | HEART RATE: 69 BPM | WEIGHT: 166 LBS | TEMPERATURE: 98.1 F | HEIGHT: 67 IN | DIASTOLIC BLOOD PRESSURE: 65 MMHG | BODY MASS INDEX: 26.06 KG/M2

## 2025-04-23 VITALS
HEART RATE: 72 BPM | HEIGHT: 67 IN | WEIGHT: 166 LBS | SYSTOLIC BLOOD PRESSURE: 125 MMHG | DIASTOLIC BLOOD PRESSURE: 69 MMHG | BODY MASS INDEX: 26.06 KG/M2

## 2025-04-23 DIAGNOSIS — Z00.00 MEDICARE ANNUAL WELLNESS VISIT, SUBSEQUENT: Primary | ICD-10-CM

## 2025-04-23 DIAGNOSIS — N18.32 STAGE 3B CHRONIC KIDNEY DISEASE (MULTI): Primary | ICD-10-CM

## 2025-04-23 DIAGNOSIS — E08.65 DIABETES MELLITUS DUE TO UNDERLYING CONDITION WITH HYPERGLYCEMIA, WITHOUT LONG-TERM CURRENT USE OF INSULIN: ICD-10-CM

## 2025-04-23 DIAGNOSIS — M25.473 ANKLE SWELLING, UNSPECIFIED LATERALITY: ICD-10-CM

## 2025-04-23 DIAGNOSIS — I10 ESSENTIAL HYPERTENSION: ICD-10-CM

## 2025-04-23 DIAGNOSIS — N18.32 STAGE 3B CHRONIC KIDNEY DISEASE (MULTI): ICD-10-CM

## 2025-04-23 PROCEDURE — 3078F DIAST BP <80 MM HG: CPT | Performed by: INTERNAL MEDICINE

## 2025-04-23 PROCEDURE — 1170F FXNL STATUS ASSESSED: CPT | Performed by: INTERNAL MEDICINE

## 2025-04-23 PROCEDURE — 1159F MED LIST DOCD IN RCRD: CPT | Performed by: INTERNAL MEDICINE

## 2025-04-23 PROCEDURE — 3008F BODY MASS INDEX DOCD: CPT | Performed by: INTERNAL MEDICINE

## 2025-04-23 PROCEDURE — G2211 COMPLEX E/M VISIT ADD ON: HCPCS | Performed by: INTERNAL MEDICINE

## 2025-04-23 PROCEDURE — 4010F ACE/ARB THERAPY RXD/TAKEN: CPT | Performed by: INTERNAL MEDICINE

## 2025-04-23 PROCEDURE — 3075F SYST BP GE 130 - 139MM HG: CPT | Performed by: INTERNAL MEDICINE

## 2025-04-23 PROCEDURE — 99214 OFFICE O/P EST MOD 30 MIN: CPT | Performed by: INTERNAL MEDICINE

## 2025-04-23 PROCEDURE — 1124F ACP DISCUSS-NO DSCNMKR DOCD: CPT | Performed by: INTERNAL MEDICINE

## 2025-04-23 PROCEDURE — 1036F TOBACCO NON-USER: CPT | Performed by: INTERNAL MEDICINE

## 2025-04-23 PROCEDURE — 3061F NEG MICROALBUMINURIA REV: CPT | Performed by: INTERNAL MEDICINE

## 2025-04-23 PROCEDURE — 3074F SYST BP LT 130 MM HG: CPT | Performed by: INTERNAL MEDICINE

## 2025-04-23 PROCEDURE — G0439 PPPS, SUBSEQ VISIT: HCPCS | Performed by: INTERNAL MEDICINE

## 2025-04-23 RX ORDER — SEMAGLUTIDE 1.34 MG/ML
0.25 INJECTION, SOLUTION SUBCUTANEOUS WEEKLY
COMMUNITY

## 2025-04-23 RX ORDER — LISINOPRIL 20 MG/1
20 TABLET ORAL DAILY
Qty: 30 TABLET | Refills: 5 | Status: SHIPPED | OUTPATIENT
Start: 2025-04-23 | End: 2025-10-20

## 2025-04-23 RX ORDER — LISINOPRIL 20 MG/1
20 TABLET ORAL DAILY
Qty: 30 TABLET | Refills: 5 | Status: SHIPPED | OUTPATIENT
Start: 2025-04-23 | End: 2025-04-23 | Stop reason: SDUPTHER

## 2025-04-23 ASSESSMENT — PATIENT HEALTH QUESTIONNAIRE - PHQ9
SUM OF ALL RESPONSES TO PHQ9 QUESTIONS 1 AND 2: 0
2. FEELING DOWN, DEPRESSED OR HOPELESS: NOT AT ALL
1. LITTLE INTEREST OR PLEASURE IN DOING THINGS: NOT AT ALL
SUM OF ALL RESPONSES TO PHQ9 QUESTIONS 1 AND 2: 0
1. LITTLE INTEREST OR PLEASURE IN DOING THINGS: NOT AT ALL
2. FEELING DOWN, DEPRESSED OR HOPELESS: NOT AT ALL

## 2025-04-23 ASSESSMENT — ENCOUNTER SYMPTOMS
LOSS OF SENSATION IN FEET: 0
OCCASIONAL FEELINGS OF UNSTEADINESS: 0
DEPRESSION: 0

## 2025-04-23 ASSESSMENT — LIFESTYLE VARIABLES
HOW OFTEN DO YOU HAVE SIX OR MORE DRINKS ON ONE OCCASION: NEVER
HOW OFTEN DO YOU HAVE A DRINK CONTAINING ALCOHOL: MONTHLY OR LESS
HOW MANY STANDARD DRINKS CONTAINING ALCOHOL DO YOU HAVE ON A TYPICAL DAY: 1 OR 2
AUDIT-C TOTAL SCORE: 1
SKIP TO QUESTIONS 9-10: 1

## 2025-04-23 ASSESSMENT — ACTIVITIES OF DAILY LIVING (ADL)
DOING_HOUSEWORK: INDEPENDENT
DRESSING: INDEPENDENT
BATHING: INDEPENDENT
GROCERY_SHOPPING: INDEPENDENT
TAKING_MEDICATION: INDEPENDENT
MANAGING_FINANCES: INDEPENDENT

## 2025-04-23 NOTE — PROGRESS NOTES
"Nephrology Outpatient Follow-up Patient Note    Chief Concern:  Follow-up for CKD    History Of Present Illness   Monica Trotter is a 74 y.o. female with a history of CKD stage 3a, T2D, hyperlipidemia, iron deficiency anemia  - CKD stage 3a: baseline Scr 1.2, VICKI last year on December, most likely due to some volume contraction and NSAID use   Lowering Scr with volume expansion   Negative work up   No hematuria, uPCR 0.14, uACR 20, Hb up to 9.7, vit D 19  - last labs 4/7/25: Scr 1.49, eGFR 37  UACR last 14, A1C 7.2    Last visit restarted on lisinopril  Started ozempic last month  - gained since Dec 2024 after hospital admission?, also claims some LE edema, no SOB/orthopnea       Past Medical History  She has a past medical history of Anemia, Arthritis, Cataract, CKD (chronic kidney disease), HTN (hypertension), Hyperlipidemia, Type 2 diabetes mellitus, and Vitreous degeneration, unspecified eye.    She has no past medical history of Personal history of irradiation.  Problem List[1]    Surgical History  She has a past surgical history that includes Colonoscopy; Hysterectomy; Cholecystectomy; Shoulder surgery; Esophagogastroduodenoscopy; Hernia repair (05/02/2024); and Breast biopsy (Right, 06/27/2022).     Social History  She reports that she has never smoked. She has been exposed to tobacco smoke. She has never used smokeless tobacco. She reports that she does not currently use alcohol. She reports that she does not currently use drugs.    Family History  Family History[2]     Allergies  Sulfur and Sulfa (sulfonamide antibiotics)    Review of Systems  A 12-point review of systems was performed and noted be negative except for that which was mentioned in the history of present illness     Last Recorded Vitals  /65 (BP Location: Right arm, Patient Position: Sitting, BP Cuff Size: Adult)   Pulse 69   Temp 36.7 °C (98.1 °F) (Tympanic)   Ht 1.702 m (5' 7\")   Wt 75.3 kg (166 lb)   SpO2 96%   BMI 26.00 kg/m²  "     Physical Exam:  Constitutional:  No acute distress  Respiration:  Breathing comfortably, no stridor  Cardiovascular:  No clubbing/cyanosis/edema in hands  Ankle trace to +1 edema bilaterally   Eyes:  EOM intact, sclera normal  Neuro:  Alert and oriented times 3, Cranial nerves II-XII grossly intact and symmetric bilaterally. No asterixis  Head and Face:  Symmetric facial features, no masses or lesions, sinuses non-tender to palpation  Neck/Lymph:  No LAD, no thyroid masses, trachea midline, No JVD  Skin:  no visible rash or scratching marks   Psych:  Alert and oriented with appropriate mood and affect      Medications:  Medication Documentation Review Audit       Reviewed by Mago Evans MD (Physician) on 12/19/24 at 1257      Medication Order Taking? Sig Documenting Provider Last Dose Status   acetaminophen (Tylenol 8 Hour) 650 mg ER tablet [id number] No Take 1 tablet (650 mg) by mouth every 8 hours if needed for mild pain (1 - 3). Do not crush, chew, or split.   Patient not taking: Reported on 12/19/2024    Libra Ramos MD Unknown Active   amitriptyline (Elavil) 25 mg tablet [id number] Yes Take 1 tablet (25 mg) by mouth once daily at bedtime. Zandra Michele MD Unknown Active   cholecalciferol (Vitamin D3) 5,000 Units tablet [id number] Yes Take 1 tablet (5,000 Units) by mouth once daily. Zandra Michele MD Unknown Active   cyanocobalamin (Vitamin B-12) 100 mcg tablet [id number] Yes Take 1 tablet (100 mcg) by mouth once daily. Sorin Barker MD  Active   ferrous sulfate, 325 mg ferrous sulfate, tablet [id number] Yes Take 1 tablet (325 mg) by mouth once daily with breakfast. Sorin Barker MD  Active   inulin (FIBER GUMMIES ORAL) [id number] Yes Take 2 tablets by mouth once daily. Historical Provider, MD Unknown Active   pantoprazole (ProtoNix) 40 mg EC tablet [id number] Yes Take 1 tablet (40 mg) by mouth once daily. Do not crush, chew, or split. Sorin Barker MD  Active   rosuvastatin (Crestor) 40 mg  tablet [id number] Yes Take 1 tablet (40 mg) by mouth once daily. Zandra Michele MD Unknown Active                    Relevant Results Recent labs reviewed in the EMR.  Lab Results   Component Value Date    HGB 10.7 (L) 04/07/2025    HGB 9.7 (L) 01/17/2025    HGB 8.9 (L) 12/17/2024    HGB 8.6 (L) 12/07/2024    MCV 87.7 04/07/2025    MCV 87 01/17/2025    MCV 91 12/17/2024    MCV 87 12/07/2024     04/07/2025     01/17/2025     12/17/2024     12/07/2024       Lab Results   Component Value Date    FERRITIN 24 04/07/2025    IRON 63 04/07/2025       Lab Results   Component Value Date     04/07/2025    K 4.2 04/07/2025     04/07/2025    BUN 23 04/07/2025    CREATININE 1.49 (H) 04/07/2025       Imaging:  I personally reviewed then and this is my impression:        Assessment/Plan   1. Stage 3b chronic kidney disease (Multi) (Primary)  - Scr a bit better after VICKI and seems to be stabilizing at current range, not going back to previous level, non albuminuric. On RAASi, and meeting criteria to use SGLT2i that will start today, labs in 1 month.   - some edema, not all wt gain are fluids, hesitant to add more diuretics today, but if worsening edema to call   - Follow Up In Nephrology  - empagliflozin (Jardiance) 10 mg tablet; Take 1 tablet (10 mg) by mouth once daily.  Dispense: 30 tablet; Refill: 11  - lisinopril 20 mg tablet; Take 1 tablet (20 mg) by mouth once daily.  Dispense: 30 tablet; Refill: 5  - Renal Function Panel; Future  - Albumin-Creatinine Ratio, Urine Random; Future  - Protein, Urine Random; Future  - CBC; Future  - Renal Function Panel; Future  - Follow Up In Nephrology; Future  - Albumin-Creatinine Ratio, Urine Random  - CBC  - Renal Function Panel    2. Essential hypertension  - suboptimal, increasing lisinopril dose   - lisinopril 20 mg tablet; Take 1 tablet (20 mg) by mouth once daily.  Dispense: 30 tablet; Refill: 5  - Follow Up In Nephrology; Future     - call if  worsening edema on SBP <110, RTO 4 months  Armand Groves MD  Nephrology and Hypertension         [1]   Patient Active Problem List  Diagnosis    Age-related cataract    Abnormal electrocardiography    Abnormal mammogram    Low back pain    Age-related nuclear cataract, bilateral    Anemia    Vitamin B12 deficiency anemia    Benign essential hypertension    Bilateral leg and foot pain    Hip pain, acute, left    Pain, joint, shoulder    Breast microcalcifications    Cervical radiculopathy    Chronic pain of both knees    CKD (chronic kidney disease), stage III (Multi)    Diabetes mellitus without complication    Diabetes mellitus, type 2 (Multi)    Depression, major, single episode, moderate (Multi)    Elevated d-dimer    Fatigue    Tired    Glaucoma suspect of both eyes    Hair loss    Has daytime drowsiness    Hearing difficulty of both ears    Hyperlipidemia    Low-density-lipoid-type (LDL) hyperlipoproteinemia    Lumbar degenerative disc disease    LVH (left ventricular hypertrophy)    Mycotic toenails    Non-sustained ventricular tachycardia (Multi)    Operculated retinal tear, left eye    Osteoarthrosis    Osteopenia of lumbar spine    Paresthesia of right foot    Post covid-19 condition, unspecified    Reactive depression    Refractive disorder    Right bundle branch block (RBBB)    Sciatica of left side    Skin rash    Stumbling gait    Varicose vein of leg    Vitamin D deficiency    Wears glasses    Adenomatous polyp of colon    Diabetic neuropathy (Multi)    DM retinopathy (Multi)    Hirsutism    Nonscarring hair loss, unspecified    Other seborrheic keratosis    Pain of left thumb    Primary osteoarthritis of both knees    Situational anxiety    Skin changes due to chronic exposure to nonionizing radiation, unspecified    Bilateral dry eyes    Abdominal wall hernia    Unilateral inguinal hernia without obstruction or gangrene, recurrent    Wellness examination    Menopause    Breast cancer screening by  mammogram    Combined form of age-related cataract, right eye    Combined form of age-related cataract, left eye    Exotropia, both eyes    Myopia of right eye    Astigmatism of both eyes    Presbyopia    VICKI (acute kidney injury)    Gastrointestinal hemorrhage    Hospital discharge follow-up   [2]   Family History  Problem Relation Name Age of Onset    No Known Problems Mother      No Known Problems Father leroylenora de la oson

## 2025-04-23 NOTE — PROGRESS NOTES
[facility]  Primary Care Office     Chief Complaint:   Chief Complaint   Patient presents with    Medicare Annual Wellness Visit Subsequent      Subjective    HPI    The patient is being seen for the subsequent annual wellness visit and follow up.    Patient see nephrology for CKD recently started on jardiance for CKD per nephrology, reports having bilateral lower ext numbness, and fullness. Says that she still gains weight despite trying intermittent fasting she is compliant with her medications.     Past Medical, Surgical and Family History: reviewed and updated in chart.   Interval History: Patient has not been hospitalized previously.   Medications and Supplements: Review of all medications by a prescribing practitioner or clinical pharmacist (such as prescriptions, OTCs, herbal therapies and supplements) documented in the medical record.  No, the patient is not chronically using opioids.   Health Risk Assessment:. Paper HRA completed by patient and scanned into chart.   Depression/Suicide Screening:Done  No falls in the past year.  No recent hospitalizations.  Advance care planning completed.     Past Medical History   Medical History[1]   Past Surgical History:   Surgical History[2]  Family History:   Family History[3]  Social History:   Tobacco Use: Low Risk  (4/23/2025)    Patient History     Smoking Tobacco Use: Never     Smokeless Tobacco Use: Never     Passive Exposure: Past    ,   Social History     Substance and Sexual Activity   Alcohol Use Not Currently    Comment: occ      Allergies:   RX Allergies[4]     ROS   The patient denies headaches, lightheadedness, changes in speech/vision, photophobia, dysphagia, diaphoresis, Chest pain, dyspnea, Abdominal pain, recent falls or trauma, and changes in bowel/urinary habits.    Objective    Objective   Vitals:    04/23/25 1400   BP: 125/69   Pulse: 72      BMI Readings from Last 15 Encounters:   04/23/25 26.00 kg/m²   04/23/25 26.00 kg/m²   01/29/25 25.06  kg/m²   12/19/24 24.43 kg/m²   12/11/24 23.49 kg/m²   12/04/24 22.40 kg/m²   12/03/24 22.55 kg/m²   10/03/24 22.71 kg/m²   09/25/24 22.71 kg/m²   09/16/24 23.34 kg/m²   05/22/24 22.55 kg/m²   05/17/24 22.93 kg/m²   05/15/24 22.40 kg/m²   05/02/24 23.51 kg/m²   03/27/24 23.81 kg/m²      75.3 kg (166 lb) (4/23/2025  2:00 PM)    Physical Exam  Physical Exam  HENT:      Head: Normocephalic.      Nose: Nose normal.   Eyes:      Pupils: Pupils are equal, round, and reactive to light.   Cardiovascular:      Rate and Rhythm: Normal rate.      Pulses: Normal pulses.   Pulmonary:      Effort: Pulmonary effort is normal.   Abdominal:      General: Abdomen is flat.   Musculoskeletal:         General: Normal range of motion.      Comments: Trace pedal edema, 1+   Skin:     General: Skin is warm.   Neurological:      Mental Status: She is alert.          Labs:  CBC:  Recent Labs     04/07/25  0947 01/17/25  1459 12/17/24  0820   WBC 3.8 5.1 4.5   HGB 10.7* 9.7* 8.9*   HCT 32.9* 29.9* 28.0*    266 268   MCV 87.7 87 91     CMP:  Recent Labs     04/07/25  0947 01/17/25  1459 12/17/24  0820 12/07/24  0603    139 142 139   K 4.2 4.5 3.9 3.7    103 109* 110*   CO2 24 27 26 25   ANIONGAP  --  14 11 8*   BUN 23 26* 18 20   CREATININE 1.49* 1.50* 1.37* 1.79*   EGFR 37* 36* 41* 29*   GLUCOSE 113* 137* 108* 110*     Recent Labs     04/07/25  0947 01/17/25  1459 12/17/24  0820 12/05/24  1023 12/04/24  1630 12/04/24  1030 02/07/24  1054 08/29/23  0938 09/12/19  1759 01/14/19  1028   ALBUMIN 4.3 4.0 3.4   < > 4.2   < > 4.1 4.3   < > 4.2   ALKPHOS  --   --   --   --  63  --  45 68   < > 77   ALT  --   --   --   --  26  --  9 23   < > 14   AST  --   --   --   --  31  --  14 21   < > 13   BILITOT  --   --   --   --  0.3  --  0.4 0.4   < > 0.4   LIPASE  --   --   --   --   --   --   --   --   --  12    < > = values in this interval not displayed.     Calcium/Phos:   Lab Results   Component Value Date    CALCIUM 9.0 04/07/2025     PHOS 4.5 (H) 04/07/2025      COAG:   Recent Labs     12/04/24  1630 02/01/22  1936   INR 1.1 1.0     CRP:   Lab Results   Component Value Date    CRP <0.10 02/01/2022      [unfilled]   ENDO:  Recent Labs     04/07/25  0947 12/05/24  0500 12/04/24  1030 05/22/24  1144 02/07/24  1054 08/29/23  0938   TSH  --  2.15 1.37  --  1.63 2.18   HGBA1C 7.2*  --  5.8* 6.0 5.7* 6.1*      CARDIAC:   Recent Labs     12/04/24  1630 05/06/22  1236    134     Recent Labs     12/04/24  1030 02/07/24  1054 04/29/23  0948 05/06/22  1236 02/02/21  1048   CHOL 99 114 114 148 181   LDLF  --   --  38 61 94   LDLCALC 34 42  --   --   --    HDL 50.5 55.8 53.6 54.5 51.7   TRIG 72 81 112 162* 175*     No data recorded    Current Medications:  Current Medications[5]    Assessment    Assessment and Plan  Monica was seen today for medicare annual wellness visit subsequent.  Diagnoses and all orders for this visit:  Medicare annual wellness visit, subsequent (Primary)  Stage 3b chronic kidney disease (Multi)  -     lisinopril 20 mg tablet; Take 1 tablet (20 mg) by mouth once daily.  Essential hypertension  -     lisinopril 20 mg tablet; Take 1 tablet (20 mg) by mouth once daily.  Ankle swelling, unspecified laterality  -     Vascular US PVR with exercise; Future  -     XR ankle left 2 views; Future  -     XR ankle right 2 views; Future  Diabetes mellitus due to underlying condition with hyperglycemia, without long-term current use of insulin  -     Vascular US PVR with exercise; Future     74f with a med hx of HTN, DM2, HLD, CKD, anemia who presents for a MWV.     HTN  Blood pressure measured today was wnl  continue the following med(s): lisinopril, chlorthalidone   The patient was advised on eating a heart healthy diet, with less salt,  Regular physical activity including exercising at lest 90 mins 4days a week  Maintaining a healthy weight and weight loss measures including intermittent -fasting and reduction in food portions  Limiting  intake of alcohol, and use of tobacco  Getting adequate amount of sleep, including practicing good sleep hygiene.     HLD  DM2  Continue rosuvastatin   On ozzempic    Ckd stage 3  Bilateral LE edema  Recently started on jardiance per nephrology   Continue lisinopril   Follows with nephrology   We ill get xray of both LE   Order PVR with exercise of both LE    RTC in 3months.   Ask about ankle xrays, ask about PVR.     Immunizations:  Immunization History   Administered Date(s) Administered    Flu vaccine (IIV4), preservative free *Check age/dose* 08/24/2020    Flu vaccine, quadrivalent, high-dose, preservative free, age 65y+ (FLUZONE) 09/20/2022    Flu vaccine, trivalent, preservative free, HIGH-DOSE, age 65y+ (Fluzone) 09/21/2016, 09/15/2017, 09/28/2018, 10/30/2019, 09/20/2022    Influenza, Seasonal, Quadrivalent, Adjuvanted 09/23/2021, 10/11/2023    Influenza, Unspecified 09/10/2010, 09/12/2011, 08/27/2012    Influenza, injectable, quadrivalent 11/12/2009    Influenza, seasonal, injectable 09/10/2010, 09/12/2011, 08/27/2012, 09/05/2013, 10/29/2014, 10/29/2015    Influenza, trivalent, adjuvanted 09/10/2024    Moderna SARS-CoV-2 Vaccination 01/28/2021, 02/25/2021, 10/29/2021    Novel influenza-H1N1-09, preservative-free 11/12/2009    Pfizer COVID-19 vaccine, 12 years and older, (30mcg/0.3mL) (Comirnaty) 10/26/2023, 09/10/2024    Pneumococcal Conjugate PCV 7 1950    Pneumococcal conjugate vaccine, 13-valent (PREVNAR 13) 09/21/2016, 03/11/2021    Pneumococcal polysaccharide vaccine, 23-valent, age 2 years and older (PNEUMOVAX 23) 05/29/2014, 12/28/2018    RESPIRATORY SYNCYTIAL VIRUS (RSV), ELIGIBLE PREGNANT PTS, 0.5 ML (ABRYSVO) 10/11/2023    Tdap vaccine, age 7 year and older (BOOSTRIX, ADACEL) 12/10/2012, 09/11/2014, 10/28/2022    Zoster, live 09/15/2017, 09/19/2017, 09/22/2017       Medicare Wellness Billing Compliance Satisfied    *This is a visual tool to show completion of required items on the day of the  visit. Green checks will only appear on the date of visit.    Review all medications by prescribing practitioner or clinical pharmacist (such as prescriptions, OTCs, herbal therapies and supplements) documented in the medical record    Past Medical, Surgical, and Family History reviewed and updated in chart     Tobacco Use Reviewed    Alcohol Use Reviewed    Illicit Drug Use Reviewed    PHQ2/9    Falls in Last Year Reviewed    Home Safety Risk Factors Reviewed    Cognitive Impairment Reviewed    Patient Self Assessment and Health Status    Current Diet Reviewed    Exercise Frequency    ADL - Hearing Impairment    ADL - Bathing    ADL - Dressing    ADL - Walks in Home    IADL - Managing Finances    IADL - Grocery Shopping    IADL - Taking Medications    IADL - Doing Housework    Vision Screening       Viet Peres MD  Internal Medicine       [1]   Past Medical History:  Diagnosis Date    Anemia     Arthritis     Cataract     CKD (chronic kidney disease)     HTN (hypertension)     Hyperlipidemia     Type 2 diabetes mellitus     Vitreous degeneration, unspecified eye     Vitreous degeneration   [2]   Past Surgical History:  Procedure Laterality Date    BREAST BIOPSY Right 06/27/2022    CHOLECYSTECTOMY      COLONOSCOPY      ESOPHAGOGASTRODUODENOSCOPY      HERNIA REPAIR  05/02/2024    Robotic right inguinal    HYSTERECTOMY      SHOULDER SURGERY     [3]   Family History  Problem Relation Name Age of Onset    No Known Problems Mother      No Known Problems Father leroy zavala    [4]   Allergies  Allergen Reactions    Sulfur Unknown    Sulfa (Sulfonamide Antibiotics) Unknown and Rash   [5]   Current Outpatient Medications   Medication Sig Dispense Refill    acetaminophen (Tylenol 8 Hour) 650 mg ER tablet Take 1 tablet (650 mg) by mouth every 8 hours if needed for mild pain (1 - 3). Do not crush, chew, or split. 90 tablet 0    amitriptyline (Elavil) 25 mg tablet Take 1 tablet (25 mg) by mouth once  daily at bedtime. 90 tablet 3    chlorthalidone (Hygroton) 25 mg tablet Take 1 tablet (25 mg) by mouth once daily. 60 tablet 1    cholecalciferol (Vitamin D3) 5,000 Units tablet Take 1 tablet (5,000 Units) by mouth once daily. 90 tablet 3    cyanocobalamin (Vitamin B-12) 100 mcg tablet TAKE ONE TABLET BY MOUTH EVERY DAY 30 tablet 0    empagliflozin (Jardiance) 10 mg tablet Take 1 tablet (10 mg) by mouth once daily. 30 tablet 11    inulin (FIBER GUMMIES ORAL) Take 2 tablets by mouth once daily.      rosuvastatin (Crestor) 40 mg tablet Take 1 tablet (40 mg) by mouth once daily. 90 tablet 3    semaglutide (Ozempic) 0.25 mg or 0.5 mg(2 mg/1.5 mL) pen injector Inject 0.25 mg under the skin 1 (one) time per week.      lisinopril 20 mg tablet Take 1 tablet (20 mg) by mouth once daily. 30 tablet 5     No current facility-administered medications for this visit.

## 2025-04-29 ENCOUNTER — APPOINTMENT (OUTPATIENT)
Dept: ENDOCRINOLOGY | Facility: CLINIC | Age: 75
End: 2025-04-29
Payer: MEDICARE

## 2025-04-29 VITALS
HEIGHT: 67 IN | WEIGHT: 167 LBS | RESPIRATION RATE: 16 BRPM | BODY MASS INDEX: 26.21 KG/M2 | HEART RATE: 78 BPM | SYSTOLIC BLOOD PRESSURE: 132 MMHG | DIASTOLIC BLOOD PRESSURE: 70 MMHG

## 2025-04-29 DIAGNOSIS — E11.9 TYPE 2 DIABETES MELLITUS WITHOUT COMPLICATION, WITHOUT LONG-TERM CURRENT USE OF INSULIN: Primary | ICD-10-CM

## 2025-04-29 DIAGNOSIS — N18.32 STAGE 3B CHRONIC KIDNEY DISEASE (MULTI): ICD-10-CM

## 2025-04-29 DIAGNOSIS — E78.5 HYPERLIPIDEMIA, UNSPECIFIED HYPERLIPIDEMIA TYPE: ICD-10-CM

## 2025-04-29 DIAGNOSIS — I10 HYPERTENSION, UNSPECIFIED TYPE: ICD-10-CM

## 2025-04-29 PROCEDURE — 99214 OFFICE O/P EST MOD 30 MIN: CPT | Performed by: INTERNAL MEDICINE

## 2025-04-29 PROCEDURE — 1160F RVW MEDS BY RX/DR IN RCRD: CPT | Performed by: INTERNAL MEDICINE

## 2025-04-29 PROCEDURE — 3078F DIAST BP <80 MM HG: CPT | Performed by: INTERNAL MEDICINE

## 2025-04-29 PROCEDURE — 3008F BODY MASS INDEX DOCD: CPT | Performed by: INTERNAL MEDICINE

## 2025-04-29 PROCEDURE — 4010F ACE/ARB THERAPY RXD/TAKEN: CPT | Performed by: INTERNAL MEDICINE

## 2025-04-29 PROCEDURE — 1159F MED LIST DOCD IN RCRD: CPT | Performed by: INTERNAL MEDICINE

## 2025-04-29 PROCEDURE — G2211 COMPLEX E/M VISIT ADD ON: HCPCS | Performed by: INTERNAL MEDICINE

## 2025-04-29 PROCEDURE — 1036F TOBACCO NON-USER: CPT | Performed by: INTERNAL MEDICINE

## 2025-04-29 PROCEDURE — 3075F SYST BP GE 130 - 139MM HG: CPT | Performed by: INTERNAL MEDICINE

## 2025-04-29 PROCEDURE — 3061F NEG MICROALBUMINURIA REV: CPT | Performed by: INTERNAL MEDICINE

## 2025-04-29 ASSESSMENT — ENCOUNTER SYMPTOMS
NAUSEA: 0
COUGH: 0
CHILLS: 0
FATIGUE: 0
SHORTNESS OF BREATH: 0
DIARRHEA: 0
FEVER: 0
VOMITING: 0
HEADACHES: 0
PALPITATIONS: 0

## 2025-04-29 NOTE — PROGRESS NOTES
Endocrinology: Follow up visit  Subjective   Patient ID: Monica Trotter is a 74 y.o. female who presents for Diabetes (Type 2 ).    PCP: Libra Ramos MD    HPI  Dm2:  Jardiance 10 mg NEW  Ozempic 0.25/0.5    Last seen for new pt visit last time  At that time was off metformin due to cri and ozempic due to recent hospital admit and weight decline during illness    Messaged in feb as sugars were up.   Restarted ozempic  Not taking 0.5 every week as wanted to discuss it.  Taking alternating 0.25/0.5  Just recently started on jardiance with nephro  Sugars excellent in yoselin    Review of Systems   Constitutional:  Negative for chills, fatigue and fever.   Respiratory:  Negative for cough and shortness of breath.    Cardiovascular:  Negative for chest pain and palpitations.   Gastrointestinal:  Negative for diarrhea, nausea and vomiting.   Neurological:  Negative for headaches.       Problem List[1]     Home Meds:  Current Outpatient Medications   Medication Instructions    acetaminophen (TYLENOL 8 HOUR) 650 mg, oral, Every 8 hours PRN, Do not crush, chew, or split.    amitriptyline (ELAVIL) 25 mg, oral, Nightly    chlorthalidone (HYGROTON) 25 mg, oral, Daily    cholecalciferol (VITAMIN D3) 5,000 Units, oral, Daily    cyanocobalamin (VITAMIN B-12) 100 mcg, oral, Daily    empagliflozin (JARDIANCE) 10 mg, oral, Daily    inulin (FIBER GUMMIES ORAL) 2 tablets, Daily    lisinopril 20 mg, oral, Daily    Ozempic 0.25 mg, Weekly    rosuvastatin (CRESTOR) 40 mg, oral, Daily        RX Allergies[2]     Objective   Vitals:    04/29/25 1128   BP: 132/70   Pulse: 78   Resp: 16      Vitals:    04/29/25 1128   Weight: 75.8 kg (167 lb)      Body mass index is 26.16 kg/m².   Physical Exam  Constitutional:       Appearance: Normal appearance. She is overweight.   HENT:      Head: Normocephalic and atraumatic.   Neck:      Thyroid: No thyroid mass, thyromegaly or thyroid tenderness.   Cardiovascular:      Rate and Rhythm: Normal rate  "and regular rhythm.      Heart sounds: No murmur heard.     No gallop.   Pulmonary:      Effort: Pulmonary effort is normal.      Breath sounds: Normal breath sounds.   Abdominal:      Palpations: Abdomen is soft.      Comments: benign   Neurological:      General: No focal deficit present.      Mental Status: She is alert and oriented to person, place, and time.      Deep Tendon Reflexes: Reflexes are normal and symmetric.   Psychiatric:         Behavior: Behavior is cooperative.         Labs:  Lab Results   Component Value Date    HGBA1C 7.2 (H) 04/07/2025    TSH 2.15 12/05/2024    FREET4 1.16 07/07/2020      No results found for: \"PR1\", \"THYROIDPAB\", \"TSI\"     Assessment/Plan   Assessment & Plan  Type 2 diabetes mellitus without complication, without long-term current use of insulin    Sugars discussed   Increase ozempic to 0.5  Continue jardiance    Hypertension, unspecified type    Bp excellent  Hyperlipidemia, unspecified hyperlipidemia type    Continue statin      Electronically signed by:  Mago Evans MD 04/29/25 11:29 AM                   [1]   Patient Active Problem List  Diagnosis    Age-related cataract    Abnormal electrocardiography    Abnormal mammogram    Low back pain    Age-related nuclear cataract, bilateral    Anemia    Vitamin B12 deficiency anemia    Benign essential hypertension    Bilateral leg and foot pain    Hip pain, acute, left    Pain, joint, shoulder    Breast microcalcifications    Cervical radiculopathy    Chronic pain of both knees    CKD (chronic kidney disease), stage III (Multi)    Diabetes mellitus without complication    Diabetes mellitus, type 2 (Multi)    Depression, major, single episode, moderate (Multi)    Elevated d-dimer    Fatigue    Tired    Glaucoma suspect of both eyes    Hair loss    Has daytime drowsiness    Hearing difficulty of both ears    Hyperlipidemia    Low-density-lipoid-type (LDL) hyperlipoproteinemia    Lumbar degenerative disc disease    LVH (left " ventricular hypertrophy)    Mycotic toenails    Non-sustained ventricular tachycardia (Multi)    Operculated retinal tear, left eye    Osteoarthrosis    Osteopenia of lumbar spine    Paresthesia of right foot    Post covid-19 condition, unspecified    Reactive depression    Refractive disorder    Right bundle branch block (RBBB)    Sciatica of left side    Skin rash    Stumbling gait    Varicose vein of leg    Vitamin D deficiency    Wears glasses    Adenomatous polyp of colon    Diabetic neuropathy (Multi)    DM retinopathy (Multi)    Hirsutism    Nonscarring hair loss, unspecified    Other seborrheic keratosis    Pain of left thumb    Primary osteoarthritis of both knees    Situational anxiety    Skin changes due to chronic exposure to nonionizing radiation, unspecified    Bilateral dry eyes    Abdominal wall hernia    Unilateral inguinal hernia without obstruction or gangrene, recurrent    Wellness examination    Menopause    Breast cancer screening by mammogram    Combined form of age-related cataract, right eye    Combined form of age-related cataract, left eye    Exotropia, both eyes    Myopia of right eye    Astigmatism of both eyes    Presbyopia    VICKI (acute kidney injury)    Gastrointestinal hemorrhage    Hospital discharge follow-up   [2]   Allergies  Allergen Reactions    Sulfur Unknown    Sulfa (Sulfonamide Antibiotics) Unknown and Rash

## 2025-04-29 NOTE — PATIENT INSTRUCTIONS
Increase ozempic to 0.5 mg every week  Keep up efforts with eating and activity  Follow up in 4 months

## 2025-05-02 ENCOUNTER — APPOINTMENT (OUTPATIENT)
Dept: OPHTHALMOLOGY | Facility: CLINIC | Age: 75
End: 2025-05-02
Payer: MEDICARE

## 2025-05-05 ENCOUNTER — APPOINTMENT (OUTPATIENT)
Dept: PRIMARY CARE | Facility: CLINIC | Age: 75
End: 2025-05-05
Payer: MEDICARE

## 2025-05-05 DIAGNOSIS — D51.9 ANEMIA DUE TO VITAMIN B12 DEFICIENCY, UNSPECIFIED B12 DEFICIENCY TYPE: ICD-10-CM

## 2025-05-05 PROCEDURE — 96372 THER/PROPH/DIAG INJ SC/IM: CPT | Performed by: INTERNAL MEDICINE

## 2025-05-05 RX ORDER — CYANOCOBALAMIN 1000 UG/ML
1000 INJECTION, SOLUTION INTRAMUSCULAR; SUBCUTANEOUS ONCE
Status: COMPLETED | OUTPATIENT
Start: 2025-05-05 | End: 2025-05-05

## 2025-05-05 RX ADMIN — CYANOCOBALAMIN 1000 MCG: 1000 INJECTION, SOLUTION INTRAMUSCULAR; SUBCUTANEOUS at 09:07

## 2025-05-06 ENCOUNTER — APPOINTMENT (OUTPATIENT)
Dept: HEMATOLOGY/ONCOLOGY | Facility: HOSPITAL | Age: 75
End: 2025-05-06
Payer: MEDICARE

## 2025-05-08 ENCOUNTER — APPOINTMENT (OUTPATIENT)
Dept: NEPHROLOGY | Facility: CLINIC | Age: 75
End: 2025-05-08
Payer: MEDICARE

## 2025-05-13 ENCOUNTER — OFFICE VISIT (OUTPATIENT)
Dept: CARDIOLOGY | Facility: HOSPITAL | Age: 75
End: 2025-05-13
Payer: MEDICARE

## 2025-05-13 VITALS
SYSTOLIC BLOOD PRESSURE: 125 MMHG | BODY MASS INDEX: 25.77 KG/M2 | HEART RATE: 75 BPM | HEIGHT: 67 IN | WEIGHT: 164.2 LBS | RESPIRATION RATE: 18 BRPM | DIASTOLIC BLOOD PRESSURE: 68 MMHG | OXYGEN SATURATION: 100 %

## 2025-05-13 DIAGNOSIS — E78.00 PURE HYPERCHOLESTEROLEMIA: ICD-10-CM

## 2025-05-13 DIAGNOSIS — I45.10 RIGHT BUNDLE BRANCH BLOCK (RBBB): ICD-10-CM

## 2025-05-13 DIAGNOSIS — I10 BENIGN ESSENTIAL HYPERTENSION: Primary | ICD-10-CM

## 2025-05-13 PROCEDURE — 3061F NEG MICROALBUMINURIA REV: CPT | Performed by: NURSE PRACTITIONER

## 2025-05-13 PROCEDURE — 99214 OFFICE O/P EST MOD 30 MIN: CPT | Performed by: NURSE PRACTITIONER

## 2025-05-13 PROCEDURE — 1160F RVW MEDS BY RX/DR IN RCRD: CPT | Performed by: NURSE PRACTITIONER

## 2025-05-13 PROCEDURE — 1159F MED LIST DOCD IN RCRD: CPT | Performed by: NURSE PRACTITIONER

## 2025-05-13 PROCEDURE — 1036F TOBACCO NON-USER: CPT | Performed by: NURSE PRACTITIONER

## 2025-05-13 PROCEDURE — 99213 OFFICE O/P EST LOW 20 MIN: CPT | Performed by: NURSE PRACTITIONER

## 2025-05-13 PROCEDURE — G2211 COMPLEX E/M VISIT ADD ON: HCPCS | Performed by: NURSE PRACTITIONER

## 2025-05-13 PROCEDURE — 4010F ACE/ARB THERAPY RXD/TAKEN: CPT | Performed by: NURSE PRACTITIONER

## 2025-05-13 PROCEDURE — 3008F BODY MASS INDEX DOCD: CPT | Performed by: NURSE PRACTITIONER

## 2025-05-13 PROCEDURE — 3078F DIAST BP <80 MM HG: CPT | Performed by: NURSE PRACTITIONER

## 2025-05-13 PROCEDURE — 3074F SYST BP LT 130 MM HG: CPT | Performed by: NURSE PRACTITIONER

## 2025-05-13 RX ORDER — CYANOCOBALAMIN 1000 UG/ML
1000 INJECTION, SOLUTION INTRAMUSCULAR; SUBCUTANEOUS
COMMUNITY

## 2025-05-13 NOTE — PROGRESS NOTES
Primary Care Physician: Libra Ramos MD  Date of Visit: 05/13/2025 11:00 AM EDT  Location of visit: [facility]     Chief Complaint:   Chief Complaint   Patient presents with    Follow-up        HPI / Summary:   Monica Trotter is a 74 y.o. female presents for followup. She has no cardiac complaints.  She exercises doing chair yoga and does aqua exercises for 50 minutes three times a week without chest pain or dyspnea. She is now on Chlorthalidone for lower extremity edema. The patient denies chest pain, shortness of breath, palpitations, lightheadedness, syncope, orthopnea, paroxysmal nocturnal dyspnea, lower extremity edema, or bleeding problems.     Her primary care physician ordered lower extremity vascular testing due to leg heaviness.              Past Medical History:  Medical History[1]     Past Surgical History:  Surgical History[2]       Social History:   reports that she has never smoked. She has been exposed to tobacco smoke. She has never used smokeless tobacco. She reports that she does not currently use alcohol. She reports that she does not currently use drugs.     Family History:  family history includes No Known Problems in her father and mother.      Allergies:  RX Allergies[3]    Outpatient Medications:  Current Outpatient Medications   Medication Instructions    acetaminophen (TYLENOL 8 HOUR) 650 mg, oral, Every 8 hours PRN, Do not crush, chew, or split.    amitriptyline (ELAVIL) 25 mg, oral, Nightly    chlorthalidone (HYGROTON) 25 mg, oral, Daily    cholecalciferol (VITAMIN D3) 5,000 Units, oral, Daily    cyanocobalamin (VITAMIN B-12) 1,000 mcg, Every 30 days    empagliflozin (JARDIANCE) 10 mg, oral, Daily    inulin (FIBER GUMMIES ORAL) 2 tablets, Daily    lisinopril 20 mg, oral, Daily    Ozempic 0.25 mg, Weekly    rosuvastatin (CRESTOR) 40 mg, oral, Daily       Physical Exam:  Vitals:    05/13/25 1054   BP: 125/68   BP Location: Left arm   Patient Position: Sitting   BP Cuff  "Size: Adult   Pulse: 75   Resp: 18   SpO2: 100%   Weight: 74.5 kg (164 lb 3.2 oz)   Height: 1.702 m (5' 7\")     Wt Readings from Last 5 Encounters:   05/13/25 74.5 kg (164 lb 3.2 oz)   04/29/25 75.8 kg (167 lb)   04/23/25 75.3 kg (166 lb)   04/23/25 75.3 kg (166 lb)   01/29/25 72.6 kg (160 lb)     Body mass index is 25.72 kg/m².   GENERAL: alert, cooperative, pleasant, in no acute distress  SKIN: warm and dry  NECK: Normal JVD, negative HJR  CARDIAC: Regular rate and rhythm with no rubs, murmurs, or gallops  CHEST: Normal respiratory efforts, lungs clear to auscultation bilaterally.  ABDOMEN: soft, nontender, nondistended  EXTREMITIES: no edema, +2 palpable RP and DP pulses bilaterally       Last Labs:  Recent Labs     04/07/25  0947 01/17/25  1459 12/17/24  0820   WBC 3.8 5.1 4.5   HGB 10.7* 9.7* 8.9*   HCT 32.9* 29.9* 28.0*    266 268   MCV 87.7 87 91     Recent Labs     04/07/25  0947 01/17/25  1459 12/17/24  0820    139 142   K 4.2 4.5 3.9    103 109*   BUN 23 26* 18   CREATININE 1.49* 1.50* 1.37*     CMP -  Lab Results   Component Value Date    CALCIUM 9.0 04/07/2025    PHOS 4.5 (H) 04/07/2025    PROT 5.7 (L) 12/05/2024    PROT 7.1 12/04/2024    ALBUMIN 4.3 04/07/2025    AST 31 12/04/2024    ALT 26 12/04/2024    ALKPHOS 63 12/04/2024    BILITOT 0.3 12/04/2024       LIPID PANEL -   Lab Results   Component Value Date    CHOL 99 12/04/2024    HDL 50.5 12/04/2024    LDLF 38 04/29/2023    TRIG 72 12/04/2024   LDL 34 12/4/24    Lab Results   Component Value Date    HGBA1C 7.2 (H) 04/07/2025    HGBA1C 6.0 05/22/2024       Last Cardiology Tests:  ECG:  Reviewed EKG from 12/4/24- normal sinus rhythm with occasional PVC, RBBB HR 99    Echo:  Echocardiogram February 8, 2022  CONCLUSIONS:   1. The left ventricular systolic function is normal with a 60-65% estimated ejection fraction.   2. Spectral Doppler shows an impaired relaxation pattern of left ventricular diastolic filling.        Cath:      "   Stress Test:  Exercise nuclear stress test September 25, 2020  The patient then underwent treadmill stress exercising to  98 % of MPHR and achieving  7.8 METS.  IMPRESSION:  * Normal stress myocardial perfusion imaging without definite  evidence of ischemia or prior infarct.  *Well-maintained left ventricular function with an ejection fraction  of 58%. Ejection fraction is improved in comparison to prior study.  *Normal left ventricular size.           Cardiac Imaging:  CT chest PE study February 1, 2022  HEART AND VESSELS:  No acute pulmonary embolism to the  segmental arterial level.     Main pulmonary artery and its branches are normal in caliber.     The thoracic aorta is of normal course and caliber  with scattered  vascular calcifications.     No coronary artery calcifications are seen.The study is not optimized  for evaluation of coronary arteries.     The cardiac chambers are not enlarged.     No evidence of pericardial effusion.    CT abdomen and pelvis 12/4/24  VESSELS:  Abdominal aorta is normal in caliber with moderate atherosclerotic  calcifications.    Assessment/Plan   Problem List Items Addressed This Visit          Cardiac and Vasculature    Benign essential hypertension - Primary    Hyperlipidemia    Right bundle branch block (RBBB)     In summary Ms. Trotter is a pleasant 74 year-old -American female with a past medical history significant for type II non-insulin-requiring diabetes, hypertension, hyperlipidemia, and obesity.  She is asymptomatic from a cardiac perspective.  Her blood pressure and lipids are at goal. She will have blood work done as ordered by her nephrologist. She should continue her current cardiovascular medications.  We will see her back in follow-up in 6 months.       Orders:  No orders of the defined types were placed in this encounter.     Followup Appts:  Future Appointments   Date Time Provider Department Center   5/16/2025  3:45 PM Keyon Polanco MD  [id number] [facility]   5/20/2025  3:45 PM Renéejuliane Matta, OD [id number] [facility]   6/12/2025  9:00 AM [facility] BMC5 PC1 MA 1 [id number] [facility]   7/17/2025 10:00 AM Consuelo John MD [id number] [facility]   7/22/2025 10:00 AM Rosanne M Casal, APRN-CNP, [facility] [id number] [facility]   7/23/2025  3:20 PM Libra Ramos MD [id number] [facility]   8/27/2025 11:20 AM Armand Groves MD [id number] [facility]   10/2/2025 11:30 AM Mago Evans MD [id number] [facility]           ____________________________________________________________  Keesha Monique APRN-CNP  [facility]         [1]   Past Medical History:  Diagnosis Date    Anemia     Arthritis     Cataract     CKD (chronic kidney disease)     HTN (hypertension)     Hyperlipidemia     Type 2 diabetes mellitus     Vitreous degeneration, unspecified eye     Vitreous degeneration   [2]   Past Surgical History:  Procedure Laterality Date    BREAST BIOPSY Right 06/27/2022    CHOLECYSTECTOMY      COLONOSCOPY      ESOPHAGOGASTRODUODENOSCOPY      HERNIA REPAIR  05/02/2024    Robotic right inguinal    HYSTERECTOMY      SHOULDER SURGERY     [3]   Allergies  Allergen Reactions    Sulfur Unknown    Sulfa (Sulfonamide Antibiotics) Unknown and Rash

## 2025-05-13 NOTE — PATIENT INSTRUCTIONS
Continue current cardiovascular medications  Follow up in 6 months  Continue physical activity   Blood work as ordered by Dr. Groves

## 2025-05-16 ENCOUNTER — OFFICE VISIT (OUTPATIENT)
Dept: PRIMARY CARE | Facility: CLINIC | Age: 75
End: 2025-05-16
Payer: MEDICARE

## 2025-05-16 ENCOUNTER — APPOINTMENT (OUTPATIENT)
Dept: OPHTHALMOLOGY | Facility: CLINIC | Age: 75
End: 2025-05-16
Payer: MEDICARE

## 2025-05-16 VITALS
HEIGHT: 67 IN | SYSTOLIC BLOOD PRESSURE: 112 MMHG | BODY MASS INDEX: 24.96 KG/M2 | WEIGHT: 159 LBS | HEART RATE: 88 BPM | DIASTOLIC BLOOD PRESSURE: 70 MMHG

## 2025-05-16 DIAGNOSIS — H93.8X3 SENSATION OF FULLNESS IN BOTH EARS: Primary | ICD-10-CM

## 2025-05-16 DIAGNOSIS — N18.32 STAGE 3B CHRONIC KIDNEY DISEASE (MULTI): Primary | ICD-10-CM

## 2025-05-16 DIAGNOSIS — I10 ESSENTIAL HYPERTENSION: ICD-10-CM

## 2025-05-16 PROCEDURE — 1036F TOBACCO NON-USER: CPT | Performed by: INTERNAL MEDICINE

## 2025-05-16 PROCEDURE — G2211 COMPLEX E/M VISIT ADD ON: HCPCS | Performed by: INTERNAL MEDICINE

## 2025-05-16 PROCEDURE — 99213 OFFICE O/P EST LOW 20 MIN: CPT | Performed by: INTERNAL MEDICINE

## 2025-05-16 PROCEDURE — 1159F MED LIST DOCD IN RCRD: CPT | Performed by: INTERNAL MEDICINE

## 2025-05-16 PROCEDURE — 4010F ACE/ARB THERAPY RXD/TAKEN: CPT | Performed by: INTERNAL MEDICINE

## 2025-05-16 PROCEDURE — 3008F BODY MASS INDEX DOCD: CPT | Performed by: INTERNAL MEDICINE

## 2025-05-16 PROCEDURE — 3078F DIAST BP <80 MM HG: CPT | Performed by: INTERNAL MEDICINE

## 2025-05-16 PROCEDURE — 3074F SYST BP LT 130 MM HG: CPT | Performed by: INTERNAL MEDICINE

## 2025-05-16 PROCEDURE — 3061F NEG MICROALBUMINURIA REV: CPT | Performed by: INTERNAL MEDICINE

## 2025-05-16 RX ORDER — FLUTICASONE PROPIONATE 50 MCG
2 SPRAY, SUSPENSION (ML) NASAL 2 TIMES DAILY
Qty: 16 G | Refills: 1 | Status: SHIPPED | OUTPATIENT
Start: 2025-05-16 | End: 2026-05-16

## 2025-05-16 RX ORDER — FEXOFENADINE HCL 180 MG/1
180 TABLET ORAL DAILY
Qty: 30 TABLET | Refills: 1 | Status: SHIPPED | OUTPATIENT
Start: 2025-05-16 | End: 2026-05-16

## 2025-05-16 ASSESSMENT — ENCOUNTER SYMPTOMS
OCCASIONAL FEELINGS OF UNSTEADINESS: 0
LOSS OF SENSATION IN FEET: 0
DEPRESSION: 0

## 2025-05-16 ASSESSMENT — PATIENT HEALTH QUESTIONNAIRE - PHQ9
SUM OF ALL RESPONSES TO PHQ9 QUESTIONS 1 AND 2: 0
2. FEELING DOWN, DEPRESSED OR HOPELESS: NOT AT ALL
1. LITTLE INTEREST OR PLEASURE IN DOING THINGS: NOT AT ALL

## 2025-05-16 NOTE — PROGRESS NOTES
Subjective   Patient ID: Monica Trotter is a 74 y.o. female who presents for Earache (Bilateral since sunday).  Earache       Ear fullness in both ears for the past few days to a week  No fever, no chills    Past Medical History   Medical History[1]   Past Surgical History:   Surgical History[2]  Family History:   Family History[3]  Social History:   Tobacco Use: Low Risk  (5/16/2025)    Patient History     Smoking Tobacco Use: Never     Smokeless Tobacco Use: Never     Passive Exposure: Past      Social History     Substance and Sexual Activity   Alcohol Use Yes    Comment: occ        Allergies:   RX Allergies[4]     ROS   Review of Systems   HENT:  Positive for ear pain.         Objective   Vitals:    05/16/25 1316   BP: 112/70   Pulse: 88      BMI Readings from Last 15 Encounters:   05/16/25 24.90 kg/m²   05/13/25 25.72 kg/m²   04/29/25 26.16 kg/m²   04/23/25 26.00 kg/m²   04/23/25 26.00 kg/m²   01/29/25 25.06 kg/m²   12/19/24 24.43 kg/m²   12/11/24 23.49 kg/m²   12/04/24 22.40 kg/m²   12/03/24 22.55 kg/m²   10/03/24 22.71 kg/m²   09/25/24 22.71 kg/m²   09/16/24 23.34 kg/m²   05/22/24 22.55 kg/m²   05/17/24 22.93 kg/m²      72.1 kg (159 lb) (5/16/2025  1:16 PM)      Physical Exam  Physical Exam   No erythema  No bulging  No fluid      Labs:  CBC:  Recent Labs     04/07/25  0947 01/17/25  1459 12/17/24  0820   WBC 3.8 5.1 4.5   HGB 10.7* 9.7* 8.9*   HCT 32.9* 29.9* 28.0*    266 268   MCV 87.7 87 91     CMP:  Recent Labs     04/07/25  0947 01/17/25  1459 12/17/24  0820 12/07/24  0603    139 142 139   K 4.2 4.5 3.9 3.7    103 109* 110*   CO2 24 27 26 25   ANIONGAP  --  14 11 8*   BUN 23 26* 18 20   CREATININE 1.49* 1.50* 1.37* 1.79*   EGFR 37* 36* 41* 29*   GLUCOSE 113* 137* 108* 110*     Recent Labs     04/07/25  0947 01/17/25  1459 12/17/24  0820 12/05/24  1023 12/04/24  1630 12/04/24  1030 02/07/24  1054 08/29/23  0938 09/12/19  1759 01/14/19  1028   ALBUMIN 4.3 4.0 3.4   < > 4.2   < > 4.1 4.3    < > 4.2   ALKPHOS  --   --   --   --  63  --  45 68   < > 77   ALT  --   --   --   --  26  --  9 23   < > 14   AST  --   --   --   --  31  --  14 21   < > 13   BILITOT  --   --   --   --  0.3  --  0.4 0.4   < > 0.4   LIPASE  --   --   --   --   --   --   --   --   --  12    < > = values in this interval not displayed.     Calcium/Phos:   Lab Results   Component Value Date    CALCIUM 9.0 04/07/2025    PHOS 4.5 (H) 04/07/2025      COAG:   Recent Labs     12/04/24  1630 02/01/22  1936   INR 1.1 1.0     CRP:   Lab Results   Component Value Date    CRP <0.10 02/01/2022      [unfilled]   ENDO:  Recent Labs     04/07/25  0947 12/05/24  0500 12/04/24  1030 05/22/24  1144 02/07/24  1054 08/29/23  0938   TSH  --  2.15 1.37  --  1.63 2.18   HGBA1C 7.2*  --  5.8* 6.0 5.7* 6.1*      CARDIAC:   Recent Labs     12/04/24  1630 05/06/22  1236    134     Recent Labs     12/04/24  1030 02/07/24  1054 04/29/23  0948 05/06/22  1236 02/02/21  1048   CHOL 99 114 114 148 181   LDLF  --   --  38 61 94   LDLCALC 34 42  --   --   --    HDL 50.5 55.8 53.6 54.5 51.7   TRIG 72 81 112 162* 175*     No data recorded    Current Medications:  Current Medications[5]    Assessment and Plan  Monica was seen today for earache.  Diagnoses and all orders for this visit:  Sensation of fullness in both ears (Primary)  -     fexofenadine (Allegra) 180 mg tablet; Take 1 tablet (180 mg) by mouth once daily.  -     fluticasone (Flonase) 50 mcg/actuation nasal spray; Administer 2 sprays into each nostril 2 times a day. Shake gently. Before first use, prime pump. After use, clean tip and replace cap.     Start Allegra 180 mg daily  Start Flonase BID  Let us know if not better in 2 weeks  May need to see ENT      Immunizations:  Immunization History   Administered Date(s) Administered    Flu vaccine (IIV4), preservative free *Check age/dose* 08/24/2020    Flu vaccine, quadrivalent, high-dose, preservative free, age 65y+ (FLUZONE) 09/20/2022    Flu  vaccine, trivalent, preservative free, HIGH-DOSE, age 65y+ (Fluzone) 09/21/2016, 09/15/2017, 09/28/2018, 10/30/2019, 09/20/2022    Influenza, Seasonal, Quadrivalent, Adjuvanted 09/23/2021, 10/11/2023    Influenza, Unspecified 09/10/2010, 09/12/2011, 08/27/2012    Influenza, injectable, quadrivalent 11/12/2009    Influenza, seasonal, injectable 09/10/2010, 09/12/2011, 08/27/2012, 09/05/2013, 10/29/2014, 10/29/2015    Influenza, trivalent, adjuvanted 09/10/2024    Moderna SARS-CoV-2 Vaccination 01/28/2021, 02/25/2021, 10/29/2021    Novel influenza-H1N1-09, preservative-free 11/12/2009    Pfizer COVID-19 vaccine, 12 years and older, (30mcg/0.3mL) (Comirnaty) 10/26/2023, 09/10/2024    Pfizer COVID-19 vaccine, bivalent, age 12 years and older (30 mcg/0.3 mL) 10/14/2022    Pfizer Gray Cap SARS-CoV-2 04/07/2022    Pneumococcal Conjugate PCV 7 1950    Pneumococcal conjugate vaccine, 13-valent (PREVNAR 13) 09/21/2016, 03/11/2021    Pneumococcal polysaccharide vaccine, 23-valent, age 2 years and older (PNEUMOVAX 23) 05/29/2014, 12/28/2018    RESPIRATORY SYNCYTIAL VIRUS (RSV), ELIGIBLE PREGNANT PTS, 0.5 ML (ABRYSVO) 10/11/2023    Tdap vaccine, age 7 year and older (BOOSTRIX, ADACEL) 12/10/2012, 09/11/2014, 10/28/2022    Zoster, live 09/15/2017, 09/19/2017, 09/22/2017            Objective   [unfilled]    Assessment/Plan            Libra Ramos MD        [1]   Past Medical History:  Diagnosis Date    Anemia     Arthritis     Cataract     CKD (chronic kidney disease)     HTN (hypertension)     Hyperlipidemia     Type 2 diabetes mellitus     Vitreous degeneration, unspecified eye     Vitreous degeneration   [2]   Past Surgical History:  Procedure Laterality Date    BREAST BIOPSY Right 06/27/2022    CHOLECYSTECTOMY      COLONOSCOPY      ESOPHAGOGASTRODUODENOSCOPY      HERNIA REPAIR  05/02/2024    Robotic right inguinal    HYSTERECTOMY      SHOULDER SURGERY     [3]   Family History  Problem Relation Name Age of Onset     No Known Problems Mother      No Known Problems Father leroy zavala    [4]   Allergies  Allergen Reactions    Sulfur Unknown    Sulfa (Sulfonamide Antibiotics) Unknown and Rash   [5]   Current Outpatient Medications   Medication Sig Dispense Refill    acetaminophen (Tylenol 8 Hour) 650 mg ER tablet Take 1 tablet (650 mg) by mouth every 8 hours if needed for mild pain (1 - 3). Do not crush, chew, or split. 90 tablet 0    amitriptyline (Elavil) 25 mg tablet Take 1 tablet (25 mg) by mouth once daily at bedtime. 90 tablet 3    chlorthalidone (Hygroton) 25 mg tablet Take 1 tablet (25 mg) by mouth once daily. 60 tablet 1    cholecalciferol (Vitamin D3) 5,000 Units tablet Take 1 tablet (5,000 Units) by mouth once daily. 90 tablet 3    cyanocobalamin (Vitamin B-12) 1,000 mcg/mL injection Inject 1 mL (1,000 mcg) into the muscle every 30 (thirty) days.      empagliflozin (Jardiance) 10 mg tablet Take 1 tablet (10 mg) by mouth once daily. 30 tablet 11    inulin (FIBER GUMMIES ORAL) Take 2 tablets by mouth once daily.      lisinopril 20 mg tablet Take 1 tablet (20 mg) by mouth once daily. 30 tablet 5    rosuvastatin (Crestor) 40 mg tablet Take 1 tablet (40 mg) by mouth once daily. 90 tablet 3    semaglutide (Ozempic) 0.25 mg or 0.5 mg(2 mg/1.5 mL) pen injector Inject 0.25 mg under the skin 1 (one) time per week.      fexofenadine (Allegra) 180 mg tablet Take 1 tablet (180 mg) by mouth once daily. 30 tablet 1    fluticasone (Flonase) 50 mcg/actuation nasal spray Administer 2 sprays into each nostril 2 times a day. Shake gently. Before first use, prime pump. After use, clean tip and replace cap. 16 g 1     No current facility-administered medications for this visit.

## 2025-05-20 ENCOUNTER — APPOINTMENT (OUTPATIENT)
Dept: OPHTHALMOLOGY | Facility: CLINIC | Age: 75
End: 2025-05-20
Payer: MEDICARE

## 2025-05-20 DIAGNOSIS — H25.812 COMBINED FORM OF AGE-RELATED CATARACT, LEFT EYE: ICD-10-CM

## 2025-05-20 DIAGNOSIS — H52.203 ASTIGMATISM OF BOTH EYES, UNSPECIFIED TYPE: ICD-10-CM

## 2025-05-20 DIAGNOSIS — H52.4 PRESBYOPIA: ICD-10-CM

## 2025-05-20 DIAGNOSIS — H25.811 COMBINED FORM OF AGE-RELATED CATARACT, RIGHT EYE: ICD-10-CM

## 2025-05-20 DIAGNOSIS — H52.11 MYOPIA OF RIGHT EYE: ICD-10-CM

## 2025-05-20 DIAGNOSIS — E11.9 DIABETES MELLITUS WITHOUT COMPLICATION: Primary | ICD-10-CM

## 2025-05-20 PROCEDURE — 99214 OFFICE O/P EST MOD 30 MIN: CPT | Performed by: OPTOMETRIST

## 2025-05-20 PROCEDURE — 92015 DETERMINE REFRACTIVE STATE: CPT | Performed by: OPTOMETRIST

## 2025-05-20 ASSESSMENT — REFRACTION_WEARINGRX
OS_SPHERE: PLANO
SPECS_TYPE: PAL
OD_AXIS: 090
OS_AXIS: 087
OD_SPHERE: -1.75
OD_CYLINDER: -2.00
OS_ADD: +2.75
OD_ADD: +2.75
OS_CYLINDER: -2.25

## 2025-05-20 ASSESSMENT — VISUAL ACUITY
OD_BAT_MED: 20/60
CORRECTION_TYPE: GLASSES
OD_PH_CC: 20/30
OD_PH_CC+: -2
OS_BAT_MED: 20/50
OS_CC: 20/40
METHOD: SNELLEN - LINEAR
OD_CC: 20/40
OS_CC+: -2

## 2025-05-20 ASSESSMENT — EXTERNAL EXAM - LEFT EYE: OS_EXAM: NORMAL

## 2025-05-20 ASSESSMENT — REFRACTION_MANIFEST
OS_AXIS: 085
OD_ADD: +3.00
OD_CYLINDER: -1.75
OD_SPHERE: -1.25
OS_SPHERE: PLANO
OD_AXIS: 085
OS_CYLINDER: -3.00
OS_ADD: +3.00

## 2025-05-20 ASSESSMENT — REFRACTION
OS_AXIS: 085
OD_CYLINDER: -2.00
OS_ADD: +3.00
OS_SPHERE: PLANO
OS_CYLINDER: -3.00
OD_AXIS: 085
OD_ADD: +3.00
OD_SPHERE: -1.00

## 2025-05-20 ASSESSMENT — CONF VISUAL FIELD
OS_INFERIOR_TEMPORAL_RESTRICTION: 0
METHOD: COUNTING FINGERS
OD_NORMAL: 1
OD_SUPERIOR_NASAL_RESTRICTION: 0
OS_SUPERIOR_TEMPORAL_RESTRICTION: 0
OS_INFERIOR_NASAL_RESTRICTION: 0
OS_NORMAL: 1
OD_SUPERIOR_TEMPORAL_RESTRICTION: 0
OD_INFERIOR_TEMPORAL_RESTRICTION: 0
OS_SUPERIOR_NASAL_RESTRICTION: 0
OD_INFERIOR_NASAL_RESTRICTION: 0

## 2025-05-20 ASSESSMENT — CUP TO DISC RATIO
OS_RATIO: 0.65
OD_RATIO: 0.60

## 2025-05-20 ASSESSMENT — TONOMETRY
OD_IOP_MMHG: 15
OS_IOP_MMHG: 20
IOP_METHOD: GOLDMANN APPLANATION

## 2025-05-20 ASSESSMENT — ENCOUNTER SYMPTOMS
ALLERGIC/IMMUNOLOGIC NEGATIVE: 0
CONSTITUTIONAL NEGATIVE: 0
EYES NEGATIVE: 0
PSYCHIATRIC NEGATIVE: 0
GASTROINTESTINAL NEGATIVE: 0
NEUROLOGICAL NEGATIVE: 0
HEMATOLOGIC/LYMPHATIC NEGATIVE: 0
ENDOCRINE NEGATIVE: 0
MUSCULOSKELETAL NEGATIVE: 0
RESPIRATORY NEGATIVE: 0
CARDIOVASCULAR NEGATIVE: 0

## 2025-05-20 ASSESSMENT — SLIT LAMP EXAM - LIDS
COMMENTS: GOOD POSITION
COMMENTS: GOOD POSITION

## 2025-05-20 ASSESSMENT — PACHYMETRY
OD_CT(UM): 598
OS_CT(UM): 615

## 2025-05-20 ASSESSMENT — EXTERNAL EXAM - RIGHT EYE: OD_EXAM: NORMAL

## 2025-05-20 NOTE — PROGRESS NOTES
Assessment/Plan   Diagnoses and all orders for this visit:  Diabetes mellitus without complication  The patient has diabetes without any evidence of retinopathy.  The patient was advised to maintain tight glucose control, tight blood pressure control, and favorable levels of cholesterol, low density lipoprotein, and high density lipoproteins.  Follow up in one year was recommended.    Combined form of age-related cataract, right eye  Combined form of age-related cataract, left eye  Patient's cataracts are visually significant at this time. Options for surgical referral discussed. Pt would like to wait on referral. Will monitor for changes. Continue care w/ Dr. John as scheduled.     Astigmatism of both eyes, unspecified type  Myopia of right eye  Presbyopia  New spec rx released today per patient request. Ocular health wnl for age OU. Monitor 1 year or sooner prn. Refraction billed today. Pt consents to receiving glasses Rx today. Patient's/guardian's signature obtained to acknowledge and confirm that a paper copy of glasses Rx was given to patient in compliance with [facility] Eyeglass Rule. Electronic copy of Rx will also be available via [employer].     Recommend hot compresses with lid massage bid OU. Recommend lid cleansing qhs OU. Discussed that this is a chronic problem which requires chronic/consistent treatment.

## 2025-05-23 DIAGNOSIS — N18.32 STAGE 3B CHRONIC KIDNEY DISEASE (MULTI): ICD-10-CM

## 2025-05-25 LAB
ALBUMIN SERPL-MCNC: 4.4 G/DL (ref 3.6–5.1)
BUN SERPL-MCNC: 25 MG/DL (ref 7–25)
BUN/CREAT SERPL: 15 (CALC) (ref 6–22)
CALCIUM SERPL-MCNC: 9.4 MG/DL (ref 8.6–10.4)
CHLORIDE SERPL-SCNC: 103 MMOL/L (ref 98–110)
CO2 SERPL-SCNC: 24 MMOL/L (ref 20–32)
CREAT SERPL-MCNC: 1.65 MG/DL (ref 0.6–1)
EGFRCR SERPLBLD CKD-EPI 2021: 32 ML/MIN/1.73M2
GLUCOSE SERPL-MCNC: 117 MG/DL (ref 65–99)
PHOSPHATE SERPL-MCNC: 4.1 MG/DL (ref 2.1–4.3)
POTASSIUM SERPL-SCNC: 4.1 MMOL/L (ref 3.5–5.3)
SODIUM SERPL-SCNC: 138 MMOL/L (ref 135–146)

## 2025-05-27 ENCOUNTER — HOSPITAL ENCOUNTER (OUTPATIENT)
Dept: VASCULAR MEDICINE | Facility: HOSPITAL | Age: 75
Discharge: HOME | End: 2025-05-27
Payer: MEDICARE

## 2025-05-27 DIAGNOSIS — I73.9 PERIPHERAL VASCULAR DISEASE, UNSPECIFIED: ICD-10-CM

## 2025-05-27 DIAGNOSIS — M25.473 ANKLE SWELLING, UNSPECIFIED LATERALITY: ICD-10-CM

## 2025-05-27 DIAGNOSIS — E08.65 DIABETES MELLITUS DUE TO UNDERLYING CONDITION WITH HYPERGLYCEMIA, WITHOUT LONG-TERM CURRENT USE OF INSULIN: ICD-10-CM

## 2025-05-27 PROCEDURE — 93924 LWR XTR VASC STDY BILAT: CPT

## 2025-06-09 ENCOUNTER — APPOINTMENT (OUTPATIENT)
Dept: PHARMACY | Facility: HOSPITAL | Age: 75
End: 2025-06-09
Payer: MEDICARE

## 2025-06-09 DIAGNOSIS — N18.32 STAGE 3B CHRONIC KIDNEY DISEASE (MULTI): ICD-10-CM

## 2025-06-09 DIAGNOSIS — I10 ESSENTIAL HYPERTENSION: ICD-10-CM

## 2025-06-09 NOTE — PROGRESS NOTES
Pharmacist Clinic: Nephrology Management    Monica Trotter is a 74 y.o. female was referred to Clinical Pharmacy Team for ckd management.     Referring Provider: Armand Groves MD    THIS IS A NEW PATIENT APPOINTMENT. PATIENT WILL BE ESTABLISHING CARE WITH CLINICAL PHARMACY.    Appointment was completed by monica who was reached at [phone].    REVIEW OF PAST APPNT (IF APPLICABLE):   N/a    Allergies Reviewed? Yes    Allergies[1]    Medications Ordered Prior to Encounter[2]    PHARMACEUTICAL ASSESSMENT:    MEDICATION RECONCILIATION    Was a medication reconciliation completed at this visit? Yes  Home Pharmacy Reviewed? Yes, describe: [facility]        Drug Interactions? No    Medication Documentation Review Audit       Reviewed by Renée Matta OD (Optometrist) on 05/21/25 at 0857      Medication Order Taking? Sig Documenting Provider Last Dose Status   acetaminophen (Tylenol 8 Hour) 650 mg ER tablet [id number] Yes Take 1 tablet (650 mg) by mouth every 8 hours if needed for mild pain (1 - 3). Do not crush, chew, or split. Libra Ramos MD  Active   amitriptyline (Elavil) 25 mg tablet [id number] Yes Take 1 tablet (25 mg) by mouth once daily at bedtime. Zandra Michele MD  Active   chlorthalidone (Hygroton) 25 mg tablet [id number] Yes Take 1 tablet (25 mg) by mouth once daily. Zandra Michele MD  Active   cholecalciferol (Vitamin D3) 5,000 Units tablet [id number] Yes Take 1 tablet (5,000 Units) by mouth once daily. Zandra Michele MD  Active   cyanocobalamin (Vitamin B-12) 1,000 mcg/mL injection [id number] Yes Inject 1 mL (1,000 mcg) into the muscle every 30 (thirty) days. Av Provider, MD  Active   empagliflozin (Jardiance) 10 mg tablet [id number] Yes Take 1 tablet (10 mg) by mouth once daily. Armand Grovse MD  Active   fexofenadine (Allegra) 180 mg tablet [id number] Yes Take 1 tablet (180 mg) by mouth once daily. Libra Ramos MD  Active   fluticasone (Flonase) 50 mcg/actuation  nasal spray [id number] Yes Administer 2 sprays into each nostril 2 times a day. Shake gently. Before first use, prime pump. After use, clean tip and replace cap. Libra Ramos MD  Active   inulin (FIBER GUMMIES ORAL) [id number] Yes Take 2 tablets by mouth once daily. Historical Provider, MD  Active   lisinopril 20 mg tablet [id number] Yes Take 1 tablet (20 mg) by mouth once daily. Viet Peres MD  Active   rosuvastatin (Crestor) 40 mg tablet [id number] Yes Take 1 tablet (40 mg) by mouth once daily. Zandra Michele MD  Active   semaglutide (Ozempic) 0.25 mg or 0.5 mg(2 mg/1.5 mL) pen injector [id number] Yes Inject 0.25 mg under the skin 1 (one) time per week. Historical Provider, MD  Active                    DISEASE MANAGEMENT ASSESSMENT:     Hypertension History:  HTN diagnosis: yes  Current Regimen  Lisinopril 20mg every day  Chlorthalidone 25mg qd  HTN at goal? yes  BP Readings from Last 6 Encounters:   05/16/25 112/70   05/13/25 125/68   04/29/25 132/70   04/23/25 125/69   04/23/25 135/65   01/29/25 145/72       Hyperlipidemia History:  Diagnosis? yes  Current Regimen  Crestor 40mg qd  At goal? yes  Lab Results   Component Value Date    CHOL 99 12/04/2024    CHOL 114 02/07/2024    CHOL 114 04/29/2023     Lab Results   Component Value Date    HDL 50.5 12/04/2024    HDL 55.8 02/07/2024    HDL 53.6 04/29/2023     Lab Results   Component Value Date    LDLCALC 34 12/04/2024    LDLCALC 42 02/07/2024     Lab Results   Component Value Date    TRIG 72 12/04/2024    TRIG 81 02/07/2024    TRIG 112 04/29/2023       Diabetes History:  Diagnosis? yes  Current Regimen:  Ozempic  Jardiance 10mg qd    Lab Results   Component Value Date    HGBA1C 7.2 (H) 04/07/2025    HGBA1C 5.8 (H) 12/04/2024    HGBA1C 6.0 05/22/2024       Lab Review  Electrolytes:      Lab Results   Component Value Date    BILITOT 0.3 12/04/2024    CALCIUM 9.4 05/24/2025    CO2 24 05/24/2025     05/24/2025    CREATININE 1.65 (H) 05/24/2025     GLUCOSE 117 (H) 05/24/2025    ALKPHOS 63 12/04/2024    K 4.1 05/24/2025    PROT 5.7 (L) 12/05/2024     05/24/2025    AST 31 12/04/2024    ALT 26 12/04/2024    BUN 25 05/24/2025    ANIONGAP 14 01/17/2025    MG 2.01 07/07/2020    PHOS 4.1 05/24/2025     12/04/2024    ALBUMIN 4.4 05/24/2025    LIPASE 12 01/14/2019    GFRF 52 (A) 08/29/2023          HISTORY OF PRESENT ILLNESS    CKD ASSESSMENT    Stage: 3b (eGFR 30-44)    Last GFR:     Lab Results   Component Value Date    EGFR 32 (L) 05/24/2025    EGFR 37 (L) 04/07/2025    EGFR 36 (L) 01/17/2025       Last Albumin/Creatine Ratio:   ALBUMIN/CREATININE RATIO, RANDOM URINE   Date Value Ref Range Status   04/07/2025 14 <30 mg/g creat Final     Comment:        The ADA defines abnormalities in albumin  excretion as follows:     Albuminuria Category        Result (mg/g creatinine)     Normal to Mildly increased   <30  Moderately increased            Severely increased           > OR = 300     The ADA recommends that at least two of three  specimens collected within a 3-6 month period be  abnormal before considering a patient to be  within a diagnostic category.       Albumin/Creatinine Ratio   Date Value Ref Range Status   01/17/2025 20.7 ug/mg Creat Final   12/04/2024 100.3 (H) <30.0 ug/mg Creat Final       CURRENT PHARMACOTHERAPY  ACE-I/ARB? Yes, describe: lisinopril 20mg qd  SGLT2-I? Yes, describe: jardiance 10mg qd  MRA? No  GLP-1 RA? Yes, describe: ozempic      MEDICATIONS REQUIRING RENAL DOSE ADJUSTMENTS:    After review, all medications are dosed appropriately   The following medications increase risk of VICKI and should be closely monitored:  ACEi and Diuretics    DISCUSSION/ASSESSMENT:   Discussed:   Jardiance 116/30 day 20% coinsurance refill too soon at the moment.   Patient Assistance Program (PAP)    Application for program to be submitted for the following medications: farxiga    Prescription Insurance:  Yes   Paid Test Claim:  Yes   [place]   Members of Household:  1   Files Taxes:  Yes         Patient verbally reports monthly or yearly income which is greater than 400% federal poverty level    At this time will unfortunately be over income for available assistance programs for jardiance. Discussed that cost has improved after meeting insurance deductible. Did discuss elimination of donut hole and new $2000 max out of pocket spend for medicare.          EDUCATION:   Counseled patient on MOA, expectations, side effects, duration of therapy, contraindications, administration, and monitoring parameters  Recommended maintaining a diet with a protein intake no more than 0.8 g/kg body weight per day and a sodium intake no more than 2 grams per day  For patients with HTN, recommended that we target a systolic blood pressure less than 120 mm Hg, when tolerated.  Answered all patient questions and concerns      Assessment/Plan   Problem List Items Addressed This Visit    None      RECOMMENDATIONS/PLAN    Continue Jardiance 10mg qd    Type of Encounter: Brenton WilkinsonD    Verbal consent to manage patient's drug therapy was obtained from the patient . They were informed they may decline to participate or withdraw from participation in pharmacy services at any time.    Continue all meds under the continuation of care with the referring provider and clinical pharmacy team.         [1]   Allergies  Allergen Reactions    Sulfur Unknown    Sulfa (Sulfonamide Antibiotics) Unknown and Rash   [2]   Current Outpatient Medications on File Prior to Visit   Medication Sig Dispense Refill    acetaminophen (Tylenol 8 Hour) 650 mg ER tablet Take 1 tablet (650 mg) by mouth every 8 hours if needed for mild pain (1 - 3). Do not crush, chew, or split. 90 tablet 0    amitriptyline (Elavil) 25 mg tablet Take 1 tablet (25 mg) by mouth once daily at bedtime. 90 tablet 3    chlorthalidone (Hygroton) 25 mg tablet Take 1 tablet (25 mg) by  mouth once daily. 60 tablet 1    cholecalciferol (Vitamin D3) 5,000 Units tablet Take 1 tablet (5,000 Units) by mouth once daily. 90 tablet 3    cyanocobalamin (Vitamin B-12) 1,000 mcg/mL injection Inject 1 mL (1,000 mcg) into the muscle every 30 (thirty) days.      empagliflozin (Jardiance) 10 mg tablet Take 1 tablet (10 mg) by mouth once daily. 30 tablet 11    fexofenadine (Allegra) 180 mg tablet Take 1 tablet (180 mg) by mouth once daily. 30 tablet 1    fluticasone (Flonase) 50 mcg/actuation nasal spray Administer 2 sprays into each nostril 2 times a day. Shake gently. Before first use, prime pump. After use, clean tip and replace cap. 16 g 1    inulin (FIBER GUMMIES ORAL) Take 2 tablets by mouth once daily.      lisinopril 20 mg tablet Take 1 tablet (20 mg) by mouth once daily. 30 tablet 5    rosuvastatin (Crestor) 40 mg tablet Take 1 tablet (40 mg) by mouth once daily. 90 tablet 3    semaglutide (Ozempic) 0.25 mg or 0.5 mg(2 mg/1.5 mL) pen injector Inject 0.25 mg under the skin 1 (one) time per week.       No current facility-administered medications on file prior to visit.

## 2025-06-11 ENCOUNTER — APPOINTMENT (OUTPATIENT)
Dept: SLEEP MEDICINE | Facility: CLINIC | Age: 75
End: 2025-06-11
Payer: MEDICARE

## 2025-06-12 ENCOUNTER — APPOINTMENT (OUTPATIENT)
Dept: PRIMARY CARE | Facility: CLINIC | Age: 75
End: 2025-06-12
Payer: MEDICARE

## 2025-06-12 DIAGNOSIS — E53.8 B12 DEFICIENCY: ICD-10-CM

## 2025-06-12 PROCEDURE — 96372 THER/PROPH/DIAG INJ SC/IM: CPT | Performed by: INTERNAL MEDICINE

## 2025-06-12 RX ORDER — CYANOCOBALAMIN 1000 UG/ML
1000 INJECTION, SOLUTION INTRAMUSCULAR; SUBCUTANEOUS ONCE
Status: COMPLETED | OUTPATIENT
Start: 2025-06-12 | End: 2025-06-12

## 2025-06-12 RX ADMIN — CYANOCOBALAMIN 1000 MCG: 1000 INJECTION, SOLUTION INTRAMUSCULAR; SUBCUTANEOUS at 09:11

## 2025-07-14 DIAGNOSIS — I10 BENIGN ESSENTIAL HYPERTENSION: ICD-10-CM

## 2025-07-15 RX ORDER — CHLORTHALIDONE 25 MG/1
25 TABLET ORAL DAILY
Qty: 60 TABLET | Refills: 0 | Status: SHIPPED | OUTPATIENT
Start: 2025-07-15

## 2025-07-17 ENCOUNTER — APPOINTMENT (OUTPATIENT)
Dept: OPHTHALMOLOGY | Facility: CLINIC | Age: 75
End: 2025-07-17
Payer: MEDICARE

## 2025-07-17 DIAGNOSIS — H40.003 GLAUCOMA SUSPECT OF BOTH EYES: Primary | ICD-10-CM

## 2025-07-17 DIAGNOSIS — E11.9 DIABETES MELLITUS WITHOUT COMPLICATION: ICD-10-CM

## 2025-07-17 DIAGNOSIS — H25.812 COMBINED FORM OF AGE-RELATED CATARACT, LEFT EYE: ICD-10-CM

## 2025-07-17 ASSESSMENT — TONOMETRY
OD_IOP_MMHG: 17
IOP_METHOD: GOLDMANN APPLANATION
OS_IOP_MMHG: 20

## 2025-07-17 ASSESSMENT — CONF VISUAL FIELD
OS_NORMAL: 1
OS_INFERIOR_NASAL_RESTRICTION: 0
OS_SUPERIOR_TEMPORAL_RESTRICTION: 0
OD_INFERIOR_TEMPORAL_RESTRICTION: 0
OD_SUPERIOR_TEMPORAL_RESTRICTION: 0
OD_NORMAL: 1
OS_SUPERIOR_NASAL_RESTRICTION: 0
OS_INFERIOR_TEMPORAL_RESTRICTION: 0
OD_SUPERIOR_NASAL_RESTRICTION: 0
OD_INFERIOR_NASAL_RESTRICTION: 0

## 2025-07-17 ASSESSMENT — VISUAL ACUITY
OD_CC: 20/25
CORRECTION_TYPE: GLASSES
OS_BAT_MED: 20/30-2
OD_BAT_MED: 20/60
OS_CC: 20/30
OS_CC+: +2
METHOD: SNELLEN - LINEAR

## 2025-07-17 ASSESSMENT — REFRACTION_WEARINGRX
OD_AXIS: 180
OS_SPHERE: -2.75
OD_CYLINDER: +1.75
OS_CYLINDER: +2.00
OD_ADD: +2.75
OS_ADD: +2.75
OS_AXIS: 005
OD_SPHERE: -4.00

## 2025-07-17 ASSESSMENT — ENCOUNTER SYMPTOMS
RESPIRATORY NEGATIVE: 0
HEMATOLOGIC/LYMPHATIC NEGATIVE: 0
MUSCULOSKELETAL NEGATIVE: 0
EYES NEGATIVE: 1
ALLERGIC/IMMUNOLOGIC NEGATIVE: 0
ENDOCRINE NEGATIVE: 0
GASTROINTESTINAL NEGATIVE: 0
NEUROLOGICAL NEGATIVE: 0
PSYCHIATRIC NEGATIVE: 0
CONSTITUTIONAL NEGATIVE: 0
CARDIOVASCULAR NEGATIVE: 0

## 2025-07-17 ASSESSMENT — SLIT LAMP EXAM - LIDS
COMMENTS: GOOD POSITION
COMMENTS: GOOD POSITION

## 2025-07-17 ASSESSMENT — CUP TO DISC RATIO
OD_RATIO: 0.7
OS_RATIO: 0.75

## 2025-07-17 ASSESSMENT — PACHYMETRY
OS_CT(UM): 615
OD_CT(UM): 598

## 2025-07-17 ASSESSMENT — EXTERNAL EXAM - LEFT EYE: OS_EXAM: NORMAL

## 2025-07-17 ASSESSMENT — EXTERNAL EXAM - RIGHT EYE: OD_EXAM: NORMAL

## 2025-07-17 NOTE — PROGRESS NOTES
1. Glaucoma suspect based on increased CDR   Tmax 21/22, /615, FH + (father)   No ocular trauma or prolonged steroids or cbd oil   Gonio: open 360 OU    Briceño Visual Field - OU - Both Eyes          Normal OU         OCT, Optic Nerve - OU - Both Eyes          Robust OU, stable OU         IOL Biometry - OU - Both Eyes          done          Patient remains low risk   monitor off gtt   f/u 6 months for IOP check      2. cataracts:   Patient complaining of glare tim at night   BAT negative   Monitor     3. diabetes with retinopathy:. no associated DME on oct mac. Monitor. Continue medical manegement of blind spot (BS)/BP

## 2025-07-22 ENCOUNTER — OFFICE VISIT (OUTPATIENT)
Dept: HEMATOLOGY/ONCOLOGY | Facility: HOSPITAL | Age: 75
End: 2025-07-22
Payer: MEDICARE

## 2025-07-22 ENCOUNTER — LAB (OUTPATIENT)
Dept: LAB | Facility: HOSPITAL | Age: 75
End: 2025-07-22
Payer: MEDICARE

## 2025-07-22 VITALS
OXYGEN SATURATION: 100 % | BODY MASS INDEX: 25.98 KG/M2 | HEIGHT: 67 IN | SYSTOLIC BLOOD PRESSURE: 122 MMHG | WEIGHT: 165.5 LBS | DIASTOLIC BLOOD PRESSURE: 63 MMHG | HEART RATE: 67 BPM | TEMPERATURE: 97.7 F

## 2025-07-22 DIAGNOSIS — N18.32 STAGE 3B CHRONIC KIDNEY DISEASE (MULTI): ICD-10-CM

## 2025-07-22 DIAGNOSIS — D63.1 ANEMIA DUE TO STAGE 3B CHRONIC KIDNEY DISEASE: ICD-10-CM

## 2025-07-22 DIAGNOSIS — N18.32 ANEMIA DUE TO STAGE 3B CHRONIC KIDNEY DISEASE: Primary | ICD-10-CM

## 2025-07-22 DIAGNOSIS — N18.32 ANEMIA DUE TO STAGE 3B CHRONIC KIDNEY DISEASE: ICD-10-CM

## 2025-07-22 DIAGNOSIS — D52.0 DIETARY FOLATE DEFICIENCY ANEMIA: ICD-10-CM

## 2025-07-22 DIAGNOSIS — D63.1 ANEMIA DUE TO STAGE 3B CHRONIC KIDNEY DISEASE: Primary | ICD-10-CM

## 2025-07-22 LAB
ALBUMIN SERPL BCP-MCNC: 4.5 G/DL (ref 3.4–5)
ALP SERPL-CCNC: 103 U/L (ref 33–136)
ALT SERPL W P-5'-P-CCNC: 42 U/L (ref 7–45)
ANION GAP SERPL CALC-SCNC: 12 MMOL/L (ref 10–20)
AST SERPL W P-5'-P-CCNC: 33 U/L (ref 9–39)
BASOPHILS # BLD AUTO: 0.04 X10*3/UL (ref 0–0.1)
BASOPHILS NFR BLD AUTO: 0.9 %
BILIRUB SERPL-MCNC: 0.5 MG/DL (ref 0–1.2)
BUN SERPL-MCNC: 26 MG/DL (ref 6–23)
CALCIUM SERPL-MCNC: 9.6 MG/DL (ref 8.6–10.3)
CHLORIDE SERPL-SCNC: 101 MMOL/L (ref 98–107)
CO2 SERPL-SCNC: 28 MMOL/L (ref 21–32)
CREAT SERPL-MCNC: 1.81 MG/DL (ref 0.5–1.05)
EGFRCR SERPLBLD CKD-EPI 2021: 29 ML/MIN/1.73M*2
EOSINOPHIL # BLD AUTO: 0.12 X10*3/UL (ref 0–0.4)
EOSINOPHIL NFR BLD AUTO: 2.8 %
ERYTHROCYTE [DISTWIDTH] IN BLOOD BY AUTOMATED COUNT: 13 % (ref 11.5–14.5)
FERRITIN SERPL-MCNC: 89 NG/ML (ref 8–150)
FOLATE SERPL-MCNC: 13.3 NG/ML
GLUCOSE SERPL-MCNC: 109 MG/DL (ref 74–99)
HCT VFR BLD AUTO: 34.3 % (ref 36–46)
HGB BLD-MCNC: 11.3 G/DL (ref 12–16)
HGB RETIC QN: 34 PG (ref 28–38)
IMM GRANULOCYTES # BLD AUTO: 0.01 X10*3/UL (ref 0–0.5)
IMM GRANULOCYTES NFR BLD AUTO: 0.2 % (ref 0–0.9)
IMMATURE RETIC FRACTION: 12.6 %
IRON SATN MFR SERPL: 33 % (ref 25–45)
IRON SERPL-MCNC: 117 UG/DL (ref 35–150)
LYMPHOCYTES # BLD AUTO: 1.55 X10*3/UL (ref 0.8–3)
LYMPHOCYTES NFR BLD AUTO: 36.6 %
MCH RBC QN AUTO: 29.7 PG (ref 26–34)
MCHC RBC AUTO-ENTMCNC: 32.9 G/DL (ref 32–36)
MCV RBC AUTO: 90 FL (ref 80–100)
MONOCYTES # BLD AUTO: 0.46 X10*3/UL (ref 0.05–0.8)
MONOCYTES NFR BLD AUTO: 10.9 %
NEUTROPHILS # BLD AUTO: 2.05 X10*3/UL (ref 1.6–5.5)
NEUTROPHILS NFR BLD AUTO: 48.6 %
NRBC BLD-RTO: 0 /100 WBCS (ref 0–0)
PLATELET # BLD AUTO: 243 X10*3/UL (ref 150–450)
POTASSIUM SERPL-SCNC: 4.1 MMOL/L (ref 3.5–5.3)
PROT SERPL-MCNC: 7.4 G/DL (ref 6.4–8.2)
PROT SERPL-MCNC: 7.5 G/DL (ref 6.4–8.2)
RBC # BLD AUTO: 3.8 X10*6/UL (ref 4–5.2)
RETICS #: 0.07 X10*6/UL (ref 0.02–0.11)
RETICS/RBC NFR AUTO: 1.9 % (ref 0.5–2)
SODIUM SERPL-SCNC: 137 MMOL/L (ref 136–145)
TIBC SERPL-MCNC: 354 UG/DL (ref 240–445)
UIBC SERPL-MCNC: 237 UG/DL (ref 110–370)
VIT B12 SERPL-MCNC: 322 PG/ML (ref 211–911)
WBC # BLD AUTO: 4.2 X10*3/UL (ref 4.4–11.3)

## 2025-07-22 PROCEDURE — 36415 COLL VENOUS BLD VENIPUNCTURE: CPT

## 2025-07-22 PROCEDURE — 84165 PROTEIN E-PHORESIS SERUM: CPT

## 2025-07-22 PROCEDURE — 85045 AUTOMATED RETICULOCYTE COUNT: CPT

## 2025-07-22 PROCEDURE — 82607 VITAMIN B-12: CPT

## 2025-07-22 PROCEDURE — 99214 OFFICE O/P EST MOD 30 MIN: CPT | Mod: 25 | Performed by: NURSE PRACTITIONER

## 2025-07-22 PROCEDURE — 84155 ASSAY OF PROTEIN SERUM: CPT

## 2025-07-22 PROCEDURE — 83540 ASSAY OF IRON: CPT

## 2025-07-22 PROCEDURE — 82746 ASSAY OF FOLIC ACID SERUM: CPT

## 2025-07-22 PROCEDURE — 85025 COMPLETE CBC W/AUTO DIFF WBC: CPT

## 2025-07-22 PROCEDURE — 80053 COMPREHEN METABOLIC PANEL: CPT

## 2025-07-22 PROCEDURE — 82728 ASSAY OF FERRITIN: CPT

## 2025-07-22 RX ORDER — FOLIC ACID 1 MG/1
1 TABLET ORAL DAILY
Qty: 90 TABLET | Refills: 1 | Status: SHIPPED | OUTPATIENT
Start: 2025-07-22 | End: 2025-07-22 | Stop reason: WASHOUT

## 2025-07-22 ASSESSMENT — ENCOUNTER SYMPTOMS
OCCASIONAL FEELINGS OF UNSTEADINESS: 0
PSYCHIATRIC NEGATIVE: 1
FEVER: 0
CARDIOVASCULAR NEGATIVE: 1
RESPIRATORY NEGATIVE: 1
CHILLS: 0
UNEXPECTED WEIGHT CHANGE: 0
GASTROINTESTINAL NEGATIVE: 1
EYES NEGATIVE: 1
HEMATOLOGIC/LYMPHATIC NEGATIVE: 1
DIAPHORESIS: 0
MUSCULOSKELETAL NEGATIVE: 1
LOSS OF SENSATION IN FEET: 1
ENDOCRINE NEGATIVE: 1
APPETITE CHANGE: 0
NEUROLOGICAL NEGATIVE: 1
DEPRESSION: 1
FATIGUE: 1

## 2025-07-22 ASSESSMENT — PAIN SCALES - GENERAL: PAINLEVEL_OUTOF10: 0-NO PAIN

## 2025-07-22 NOTE — PROGRESS NOTES
Patient ID: Monica Trotter is a 75 y.o. female.  Referring Physician: Sorin Barker MD  [address] [place] [address]  Primary Care Provider: Libra Ramos MD  Visit Type: Initial Visit      Subjective    Location:  [facility] [place]  Initial consult: 2025  Reason: Anemia/CKD    Patient is a 74 yo AA woman with a PMH of Stage 3 CKD, NIDDM, B12 and Iron Deficiency, GI hemorrhage and was referred to benign hematology for consultation Dr. Barker .    Today, patient presents for initial consultation. Endorses fatigue. States that she has noticed being tired at the end of the day although she still goes to the gym to exercise several times per week. She lost her , and 2 close friends last year and had 2 surgeries and states that she has not been 100% since that time. She sees Nephrology and DM is managed by Endocrinology. Denies abnormal weight loss, night sweats, fever. Denies chills, sob, cp, n/v/d, n/t. No PICA. Denies any abnormal bleeding or bruising. No recurrent infections or lymphadenopathy. No joint/body pain. No known blood disorders in family. Has had surgery in past w/o issue. Never had blood/blood products. Denies NSAID use. Patient also states that she was a regular blood donor until 4 years ago.     LAZARO history - on and off for years  Colonoscopy -   EGD -   Stool - no melena  Urine - no hematuria  Menses - post menopausal  Diet - Eats healthy diet including meat, fish, chicken and vegetables  UTD Cancer screenings - up to date    Surgical History: Hernia repair and ulcer with stomach tear last year, Left shoulder replacement 20 years ago.    Social History: Never smoked, minimal ETOH, no recreational drugs, Lives alone.   last year after 57 years.    Review of Systems   Constitutional:  Positive for fatigue. Negative for appetite change, chills, diaphoresis, fever and unexpected weight change.   HENT:  Negative.  Negative for hearing loss.    Eyes:  "Negative.    Respiratory: Negative.     Cardiovascular: Negative.    Gastrointestinal: Negative.    Endocrine: Negative.    Genitourinary: Negative.     Musculoskeletal: Negative.    Skin: Negative.    Neurological: Negative.    Hematological: Negative.    Psychiatric/Behavioral: Negative.          Objective   BSA: 1.88 meters squared  /63 (BP Location: Right arm, Patient Position: Sitting, BP Cuff Size: Adult)   Pulse 67   Temp 36.5 °C (97.7 °F) (Temporal)   Ht 1.692 m (5' 6.61\") Comment: height verified-7/22/2025  Wt 75.1 kg (165 lb 8 oz)   SpO2 100%   BMI 26.22 kg/m²      has a past medical history of Anemia, Arthritis, Cataract, CKD (chronic kidney disease), HTN (hypertension), Hyperlipidemia, Type 2 diabetes mellitus, and Vitreous degeneration, unspecified eye.    She has no past medical history of Personal history of irradiation.   has a past surgical history that includes Colonoscopy; Hysterectomy; Cholecystectomy; Shoulder surgery; Esophagogastroduodenoscopy; Hernia repair (05/02/2024); and Breast biopsy (Right, 06/27/2022).  Family History[1]      Monica Trotter  reports that she has never smoked. She has been exposed to tobacco smoke. She has never used smokeless tobacco.  She  reports current alcohol use.  She  reports that she does not currently use drugs.    Physical Exam  HENT:      Head: Normocephalic.      Nose: Nose normal.      Mouth/Throat:      Mouth: Mucous membranes are moist.     Eyes:      Pupils: Pupils are equal, round, and reactive to light.       Cardiovascular:      Rate and Rhythm: Normal rate and regular rhythm.      Pulses: Normal pulses.      Heart sounds: Normal heart sounds.   Pulmonary:      Effort: Pulmonary effort is normal.      Breath sounds: Normal breath sounds.   Abdominal:      General: Bowel sounds are normal.      Palpations: Abdomen is soft.     Musculoskeletal:         General: Normal range of motion.     Skin:     General: Skin is warm and dry. "     Neurological:      General: No focal deficit present.      Mental Status: She is alert and oriented to person, place, and time.     Psychiatric:         Mood and Affect: Mood normal.         Behavior: Behavior normal.         WBC   Date/Time Value Ref Range Status   07/22/2025 11:00 AM 4.2 (L) 4.4 - 11.3 x10*3/uL Final   01/17/2025 02:59 PM 5.1 4.4 - 11.3 x10*3/uL Final   12/17/2024 08:20 AM 4.5 4.4 - 11.3 x10*3/uL Final     WHITE BLOOD CELL COUNT   Date/Time Value Ref Range Status   04/07/2025 09:47 AM 3.8 3.8 - 10.8 Thousand/uL Final     nRBC   Date Value Ref Range Status   07/22/2025 0.0 0.0 - 0.0 /100 WBCs Final   01/17/2025 0.0 0.0 - 0.0 /100 WBCs Final   12/17/2024 0.0 0.0 - 0.0 /100 WBCs Final     RBC   Date Value Ref Range Status   07/22/2025 3.80 (L) 4.00 - 5.20 x10*6/uL Final   01/17/2025 3.42 (L) 4.00 - 5.20 x10*6/uL Final   12/17/2024 3.07 (L) 4.00 - 5.20 x10*6/uL Final     RED BLOOD CELL COUNT   Date Value Ref Range Status   04/07/2025 3.75 (L) 3.80 - 5.10 Million/uL Final     Hemoglobin   Date Value Ref Range Status   07/22/2025 11.3 (L) 12.0 - 16.0 g/dL Final   01/17/2025 9.7 (L) 12.0 - 16.0 g/dL Final   12/17/2024 8.9 (L) 12.0 - 16.0 g/dL Final     HEMOGLOBIN   Date Value Ref Range Status   04/07/2025 10.7 (L) 11.7 - 15.5 g/dL Final     Hematocrit   Date Value Ref Range Status   07/22/2025 34.3 (L) 36.0 - 46.0 % Final   01/17/2025 29.9 (L) 36.0 - 46.0 % Final   12/17/2024 28.0 (L) 36.0 - 46.0 % Final     HEMATOCRIT   Date Value Ref Range Status   04/07/2025 32.9 (L) 35.0 - 45.0 % Final     MCV   Date/Time Value Ref Range Status   07/22/2025 11:00 AM 90 80 - 100 fL Final   04/07/2025 09:47 AM 87.7 80.0 - 100.0 fL Final   01/17/2025 02:59 PM 87 80 - 100 fL Final   12/17/2024 08:20 AM 91 80 - 100 fL Final     MCH   Date/Time Value Ref Range Status   07/22/2025 11:00 AM 29.7 26.0 - 34.0 pg Final   04/07/2025 09:47 AM 28.5 27.0 - 33.0 pg Final   01/17/2025 02:59 PM 28.4 26.0 - 34.0 pg Final    12/17/2024 08:20 AM 29.0 26.0 - 34.0 pg Final     MCHC   Date/Time Value Ref Range Status   07/22/2025 11:00 AM 32.9 32.0 - 36.0 g/dL Final   04/07/2025 09:47 AM 32.5 32.0 - 36.0 g/dL Final     Comment:     For adults, a slight decrease in the calculated MCHC  value (in the range of 30 to 32 g/dL) is most likely  not clinically significant; however, it should be  interpreted with caution in correlation with other  red cell parameters and the patient's clinical  condition.     01/17/2025 02:59 PM 32.4 32.0 - 36.0 g/dL Final   12/17/2024 08:20 AM 31.8 (L) 32.0 - 36.0 g/dL Final     RDW   Date/Time Value Ref Range Status   07/22/2025 11:00 AM 13.0 11.5 - 14.5 % Final   04/07/2025 09:47 AM 13.7 11.0 - 15.0 % Final   01/17/2025 02:59 PM 15.4 (H) 11.5 - 14.5 % Final   12/17/2024 08:20 AM 15.7 (H) 11.5 - 14.5 % Final     Platelets   Date/Time Value Ref Range Status   07/22/2025 11:00  150 - 450 x10*3/uL Final   01/17/2025 02:59  150 - 450 x10*3/uL Final   12/17/2024 08:20  150 - 450 x10*3/uL Final     PLATELET COUNT   Date/Time Value Ref Range Status   04/07/2025 09:47  140 - 400 Thousand/uL Final     MPV   Date/Time Value Ref Range Status   04/07/2025 09:47 AM 9.7 7.5 - 12.5 fL Final     Neutrophils %   Date/Time Value Ref Range Status   07/22/2025 11:00 AM 48.6 40.0 - 80.0 % Final   12/04/2024 04:30 PM 46.3 40.0 - 80.0 % Final   02/01/2022 07:36 PM 48.1 40.0 - 80.0 % Final   06/09/2020 11:18 AM 40.2 40.0 - 80.0 % Final   12/03/2019 01:17 PM 49.2 40.0 - 80.0 % Final     Immature Granulocytes %, Automated   Date/Time Value Ref Range Status   07/22/2025 11:00 AM 0.2 0.0 - 0.9 % Final     Comment:     Immature Granulocyte Count (IG) includes promyelocytes, myelocytes and metamyelocytes but does not include bands. Percent differential counts (%) should be interpreted in the context of the absolute cell counts (cells/UL).   12/04/2024 04:30 PM 0.2 0.0 - 0.9 % Final     Comment:     Immature  Granulocyte Count (IG) includes promyelocytes, myelocytes and metamyelocytes but does not include bands. Percent differential counts (%) should be interpreted in the context of the absolute cell counts (cells/UL).   02/01/2022 07:36 PM 0.7 0.0 - 0.9 % Final     Comment:      Immature Granulocyte Count (IG) includes promyelocytes,    myelocytes and metamyelocytes but does not include bands.   Percent differential counts (%) should be interpreted in the   context of the absolute cell counts (cells/L).     06/09/2020 11:18 AM 0.2 0.0 - 0.9 % Final     Comment:      Immature Granulocyte Count (IG) includes promyelocytes,    myelocytes and metamyelocytes but does not include bands.   Percent differential counts (%) should be interpreted in the   context of the absolute cell counts (cells/L).     12/03/2019 01:17 PM 0.2 0.0 - 0.9 % Final     Comment:      Percent differential counts (%) should be interpreted in the   context of the absolute cell counts (cells/L).       Lymphocytes %   Date/Time Value Ref Range Status   07/22/2025 11:00 AM 36.6 13.0 - 44.0 % Final   12/04/2024 04:30 PM 39.4 13.0 - 44.0 % Final   02/01/2022 07:36 PM 37.4 13.0 - 44.0 % Final   06/09/2020 11:18 AM 45.5 13.0 - 44.0 % Final   12/03/2019 01:17 PM 37.7 13.0 - 44.0 % Final     Monocytes %   Date/Time Value Ref Range Status   07/22/2025 11:00 AM 10.9 2.0 - 10.0 % Final   12/04/2024 04:30 PM 10.9 2.0 - 10.0 % Final   02/01/2022 07:36 PM 10.5 2.0 - 10.0 % Final   06/09/2020 11:18 AM 10.1 2.0 - 10.0 % Final   12/03/2019 01:17 PM 9.3 2.0 - 10.0 % Final     Eosinophils %   Date/Time Value Ref Range Status   07/22/2025 11:00 AM 2.8 0.0 - 6.0 % Final   12/04/2024 04:30 PM 1.7 0.0 - 6.0 % Final   02/01/2022 07:36 PM 2.1 0.0 - 6.0 % Final   06/09/2020 11:18 AM 2.8 0.0 - 6.0 % Final   12/03/2019 01:17 PM 2.4 0.0 - 6.0 % Final     Basophils %   Date/Time Value Ref Range Status   07/22/2025 11:00 AM 0.9 0.0 - 2.0 % Final   12/04/2024 04:30 PM 1.5 0.0 - 2.0 %  Final   02/01/2022 07:36 PM 1.2 0.0 - 2.0 % Final   06/09/2020 11:18 AM 1.2 0.0 - 2.0 % Final   12/03/2019 01:17 PM 1.2 0.0 - 2.0 % Final     Neutrophils Absolute   Date/Time Value Ref Range Status   07/22/2025 11:00 AM 2.05 1.60 - 5.50 x10*3/uL Final     Comment:     Percent differential counts (%) should be interpreted in the context of the absolute cell counts (cells/uL).   12/04/2024 04:30 PM 2.12 1.60 - 5.50 x10*3/uL Final     Comment:     Percent differential counts (%) should be interpreted in the context of the absolute cell counts (cells/uL).   02/01/2022 07:36 PM 2.69 1.60 - 5.50 x10E9/L Final   06/09/2020 11:18 AM 1.71 1.20 - 7.70 x10E9/L Final   12/03/2019 01:17 PM 2.42 1.20 - 7.70 x10E9/L Final     Immature Granulocytes Absolute, Automated   Date/Time Value Ref Range Status   07/22/2025 11:00 AM 0.01 0.00 - 0.50 x10*3/uL Final   12/04/2024 04:30 PM 0.01 0.00 - 0.50 x10*3/uL Final     Lymphocytes Absolute   Date/Time Value Ref Range Status   07/22/2025 11:00 AM 1.55 0.80 - 3.00 x10*3/uL Final   12/04/2024 04:30 PM 1.81 0.80 - 3.00 x10*3/uL Final   02/01/2022 07:36 PM 2.10 0.80 - 3.00 x10E9/L Final   06/09/2020 11:18 AM 1.94 1.20 - 4.80 x10E9/L Final   12/03/2019 01:17 PM 1.86 1.20 - 4.80 x10E9/L Final     Monocytes Absolute   Date/Time Value Ref Range Status   07/22/2025 11:00 AM 0.46 0.05 - 0.80 x10*3/uL Final   12/04/2024 04:30 PM 0.50 0.05 - 0.80 x10*3/uL Final   02/01/2022 07:36 PM 0.59 0.05 - 0.80 x10E9/L Final   06/09/2020 11:18 AM 0.43 0.10 - 1.00 x10E9/L Final   12/03/2019 01:17 PM 0.46 0.10 - 1.00 x10E9/L Final     Eosinophils Absolute   Date/Time Value Ref Range Status   07/22/2025 11:00 AM 0.12 0.00 - 0.40 x10*3/uL Final   12/04/2024 04:30 PM 0.08 0.00 - 0.40 x10*3/uL Final   02/01/2022 07:36 PM 0.12 0.00 - 0.40 x10E9/L Final   06/09/2020 11:18 AM 0.12 0.00 - 0.70 x10E9/L Final   12/03/2019 01:17 PM 0.12 0.00 - 0.70 x10E9/L Final     Basophils Absolute   Date/Time Value Ref Range Status    07/22/2025 11:00 AM 0.04 0.00 - 0.10 x10*3/uL Final   12/04/2024 04:30 PM 0.07 0.00 - 0.10 x10*3/uL Final   02/01/2022 07:36 PM 0.07 0.00 - 0.10 x10E9/L Final   06/09/2020 11:18 AM 0.05 0.00 - 0.10 x10E9/L Final   12/03/2019 01:17 PM 0.06 0.00 - 0.10 x10E9/L Final     Assessment/Plan    76 yo AA woman with a PMH of Stage 3 CKD, NIDDM, B12 and Iron Deficiency, GI hemorrhage and was referred to benign hematology for consultation Dr. Barker. History noted above. Review of her medical record reveals anemia since 2019. Patient did have a GI bleed in December 24 with hemglobin of 6.1 g/dL. Diagnosed at that time with acute on chronic anemia. She was transfused and started on oral iron and B12 injections. She states that since that time she has been very tired. She also lost her  and 2 close friends last year and is still grieving the loss.    Discussed possible etiologies including multifactorial, nutritional deficiencies, anemia of chronic disease, malabsorption, hemopathy, medications, bleeding, malignancy, etc. Given this patient's history of acute on chronic anemia, I believe the primary cause for her chronic anemia is CKD. The acute anemia in December was secondary GI bleed.    I discussed with the patient that I would like to take a fresh look at her labwork today to re-evaluate her anemia. If her iron and B12 have normalized and her hemoglobin is below 10g/dL, we can start EPO replacement. If not, we will replace iron and B12 and have her follow up in 3 months for surveillance of her anemia and make further recommendations at that time.     Plan:   1. Labs today: CBC/diff, CMP, retics, iron studies, B12, folate, EPO level, TSH, SPEP. So far her hemoglobin has improved to 11.3, iron stores are back to normal, B12 is within normal limits. Folate is on the low end of normal however patient is taking daily  2. Continue B12 and iron supplementation prescribed by PCP.  3. Follow up in 3 months with Arpita  Patricia NP since I will no longer be seeing patients at the main campus.    I had an extensive discussion with the patient regarding the diagnosis and discussed the plan of therapy, including general considerations regarding side effects and outcomes. Pt understood and gave appropriate teach back about the plan of care. All questions were answered to the patient's satisfaction. The patient is instructed to contact us at any time if questions or problems arise. Thank you for the opportunity to participate in the care of this very pleasant patient.     Problem List Items Addressed This Visit           ICD-10-CM    Anemia - Primary D64.9    Relevant Medications    folic acid (Folvite) 1 mg tablet    Other Relevant Orders    CBC and Auto Differential (Completed)    Comprehensive Metabolic Panel (Completed)    Ferritin (Completed)    Folate (Completed)    Iron and TIBC (Completed)    Vitamin B12 (Completed)    Reticulocytes (Completed)    Serum Protein Electrophoresis    Clinic Appointment Request Follow Up; SAMINA FOSS    CKD (chronic kidney disease), stage III (Multi) N18.30    Relevant Medications    folic acid (Folvite) 1 mg tablet    Other Relevant Orders    CBC and Auto Differential (Completed)    Comprehensive Metabolic Panel (Completed)    Ferritin (Completed)    Folate (Completed)    Iron and TIBC (Completed)    Vitamin B12 (Completed)    Reticulocytes (Completed)    Serum Protein Electrophoresis    Clinic Appointment Request Follow Up; ENTSIE, JENNIFER Rosanne M Casal, APRN-CNP, DNP                                [1]   Family History  Problem Relation Name Age of Onset    No Known Problems Mother      No Known Problems Father leroy zavala

## 2025-07-23 ENCOUNTER — APPOINTMENT (OUTPATIENT)
Dept: PRIMARY CARE | Facility: CLINIC | Age: 75
End: 2025-07-23
Payer: MEDICARE

## 2025-07-23 VITALS
BODY MASS INDEX: 25.11 KG/M2 | DIASTOLIC BLOOD PRESSURE: 65 MMHG | WEIGHT: 160 LBS | HEIGHT: 67 IN | HEART RATE: 75 BPM | SYSTOLIC BLOOD PRESSURE: 106 MMHG

## 2025-07-23 DIAGNOSIS — E11.9 TYPE 2 DIABETES MELLITUS WITHOUT COMPLICATION, WITHOUT LONG-TERM CURRENT USE OF INSULIN: ICD-10-CM

## 2025-07-23 DIAGNOSIS — R15.9 INCONTINENCE OF FECES, UNSPECIFIED FECAL INCONTINENCE TYPE: Primary | ICD-10-CM

## 2025-07-23 DIAGNOSIS — D51.9 ANEMIA DUE TO VITAMIN B12 DEFICIENCY, UNSPECIFIED B12 DEFICIENCY TYPE: ICD-10-CM

## 2025-07-23 DIAGNOSIS — E53.8 B12 DEFICIENCY: ICD-10-CM

## 2025-07-23 DIAGNOSIS — K92.1 MELENA: ICD-10-CM

## 2025-07-23 LAB — POC HEMOGLOBIN A1C: 6.3 % (ref 4.2–6.5)

## 2025-07-23 RX ORDER — CYANOCOBALAMIN 1000 UG/ML
1000 INJECTION, SOLUTION INTRAMUSCULAR; SUBCUTANEOUS ONCE
Status: COMPLETED | OUTPATIENT
Start: 2025-07-23 | End: 2025-07-23

## 2025-07-23 RX ADMIN — CYANOCOBALAMIN 1000 MCG: 1000 INJECTION, SOLUTION INTRAMUSCULAR; SUBCUTANEOUS at 15:13

## 2025-07-23 ASSESSMENT — ENCOUNTER SYMPTOMS
APPETITE CHANGE: 0
FATIGUE: 1
ABDOMINAL DISTENTION: 0
BACK PAIN: 0
CHILLS: 0
ANAL BLEEDING: 0
DIARRHEA: 0
VOMITING: 0
RECTAL PAIN: 0
NAUSEA: 0
UNEXPECTED WEIGHT CHANGE: 0
ABDOMINAL PAIN: 0

## 2025-07-23 ASSESSMENT — LIFESTYLE VARIABLES
AUDIT-C TOTAL SCORE: 1
HOW MANY STANDARD DRINKS CONTAINING ALCOHOL DO YOU HAVE ON A TYPICAL DAY: 1 OR 2
HOW OFTEN DO YOU HAVE SIX OR MORE DRINKS ON ONE OCCASION: NEVER
HOW OFTEN DO YOU HAVE A DRINK CONTAINING ALCOHOL: MONTHLY OR LESS
SKIP TO QUESTIONS 9-10: 1

## 2025-07-23 NOTE — PROGRESS NOTES
"Subjective   Patient ID: Monica Trotter is a 75 y.o. female  DM2, HLD, iron def anemia, GIB 2024 who presents for Follow-up.    HPI   She reports she is doing well but now she does note some bowel leakage overnight sometimes, happens 2-3 times monthly and has been ongoing for a few months. Denies any changes in diet or lifestyle and not aware of any inciting event. Denies urinary incontinence. Does not have bowel incontinence during the day either, just a small amount of leakage overnight occasionally. Started in June, has only had a few nights of it occurring. Happened last week again for a few nights in a row. Denies abdominal pain, diarrhea, BM urgency. She denies any other GI symptoms today. Also reports her stool has been very dark.    She reports she still has regular BM, does occasionally endorse some constipation but overall has regular BM. Denies back pain, saddle anesthesia. She does report some diabetic neuropathy to her feet.    Does still have some significant fatigue. Is following up with heme/onc for her anemia as well. Stable.     Review of Systems   Constitutional:  Positive for fatigue. Negative for appetite change, chills and unexpected weight change.   Gastrointestinal:  Negative for abdominal distention, abdominal pain, anal bleeding, diarrhea, nausea, rectal pain and vomiting.   Musculoskeletal:  Negative for back pain.   Negative unless noted above.    Objective   /65 (BP Location: Right arm, Patient Position: Sitting, BP Cuff Size: Adult)   Pulse 75   Ht 1.692 m (5' 6.61\")   Wt 72.6 kg (160 lb)   BMI 25.35 kg/m²     Physical Exam  Constitutional:       General: She is not in acute distress.     Appearance: Normal appearance.   HENT:      Head: Normocephalic and atraumatic.      Mouth/Throat:      Mouth: Mucous membranes are moist.     Eyes:      Extraocular Movements: Extraocular movements intact.      Conjunctiva/sclera: Conjunctivae normal.       Cardiovascular:      Rate and " Rhythm: Normal rate and regular rhythm.      Pulses: Normal pulses.      Heart sounds: Normal heart sounds. No murmur heard.  Pulmonary:      Effort: Pulmonary effort is normal. No respiratory distress.      Breath sounds: Normal breath sounds.   Abdominal:      General: Abdomen is flat. There is no distension.      Palpations: Abdomen is soft.      Tenderness: There is no abdominal tenderness.     Musculoskeletal:      Right lower leg: No edema.      Left lower leg: No edema.     Skin:     General: Skin is warm and dry.     Neurological:      General: No focal deficit present.      Mental Status: She is alert. Mental status is at baseline.         Assessment/Plan   Diagnoses and all orders for this visit:  Incontinence of feces, unspecified fecal incontinence type  -     Referral to Colorectal Surgery; Future  Melena  -     Referral to Gastroenterology; Future  Type 2 diabetes mellitus without complication, without long-term current use of insulin   - Repeat A1c today in office 6.2%, improved. Continue current management.  Anemia due to vitamin B12 deficiency, unspecified B12 deficiency type   - B12 given in office today. Following w/ hematology/oncology for anemia.    - RTC 6 weeks  - Labs UTD    Karol Reyes DO  [facility] PGY-2

## 2025-07-24 LAB
ALBUMIN: 4.6 G/DL (ref 3.4–5)
ALPHA 1 GLOBULIN: 0.3 G/DL (ref 0.2–0.6)
ALPHA 2 GLOBULIN: 0.7 G/DL (ref 0.4–1.1)
BETA GLOBULIN: 0.7 G/DL (ref 0.5–1.2)
GAMMA GLOBULIN: 1.2 G/DL (ref 0.5–1.4)
PATH REVIEW-SERUM PROTEIN ELECTROPHORESIS: NORMAL
PROTEIN ELECTROPHORESIS COMMENT: NORMAL

## 2025-07-25 ENCOUNTER — OFFICE VISIT (OUTPATIENT)
Dept: SURGERY | Facility: CLINIC | Age: 75
End: 2025-07-25
Payer: MEDICARE

## 2025-07-25 VITALS
HEART RATE: 81 BPM | BODY MASS INDEX: 26.53 KG/M2 | HEIGHT: 67 IN | SYSTOLIC BLOOD PRESSURE: 119 MMHG | WEIGHT: 169 LBS | DIASTOLIC BLOOD PRESSURE: 68 MMHG

## 2025-07-25 DIAGNOSIS — R15.1 FECAL SOILING: Primary | ICD-10-CM

## 2025-07-25 PROCEDURE — 46600 DIAGNOSTIC ANOSCOPY SPX: CPT | Performed by: NURSE PRACTITIONER

## 2025-07-25 PROCEDURE — 99213 OFFICE O/P EST LOW 20 MIN: CPT | Mod: 25 | Performed by: NURSE PRACTITIONER

## 2025-07-25 NOTE — PROGRESS NOTES
History Of Present Illness  Monica Trotter is a 75 y.o. female presenting with fecal soiling.     During the night she has had a few episodes of leakage of stool.  She has been having to wear a pad at night now and during the day.  It has happened about 3x/week now, over the past month.  No c/o any leakage of stool throughout the day.     She has no issues with her BM's and will have 2-3 soft-hard BM's every day.  No c/o any rectal bleeding.  No c/o any anal pain.  She takes fiber gummies daily.  No hx of any perianal surgeries.  She has had 3 vaginal births with no tears.    No Biofeedback yet.      Colonoscopy 2022 1 TA polyp removed    Past Medical History  She has a past medical history of Anemia, Arthritis, Cataract, CKD (chronic kidney disease), HTN (hypertension), Hyperlipidemia, Type 2 diabetes mellitus, and Vitreous degeneration, unspecified eye.    She has no past medical history of Personal history of irradiation.    Surgical History  She has a past surgical history that includes Colonoscopy; Hysterectomy; Cholecystectomy; Shoulder surgery; Esophagogastroduodenoscopy; Hernia repair (05/02/2024); and Breast biopsy (Right, 06/27/2022).     Social History  She reports that she has never smoked. She has been exposed to tobacco smoke. She has never used smokeless tobacco. She reports current alcohol use. She reports that she does not currently use drugs.    Family History  Family History[1]     Allergies  Sulfur and Sulfa (sulfonamide antibiotics)    Review of Systems   All other systems reviewed and are negative.       Physical Exam  Exam conducted with a chaperone present.   Constitutional:       Appearance: Normal appearance.   HENT:      Head: Normocephalic and atraumatic.   Pulmonary:      Effort: Pulmonary effort is normal.     Musculoskeletal:         General: Normal range of motion.     Skin:     General: Skin is warm and dry.     Neurological:      General: No focal deficit present.      Mental Status:  She is alert and oriented to person, place, and time.     Psychiatric:         Mood and Affect: Mood normal.         Behavior: Behavior normal.          Anoscopy    Date/Time: 7/25/2025 12:21 PM    Performed by: JO Stephens  Authorized by: JO Stephens    Consent:     Consent obtained:  Verbal    Consent given by:  Patient    Risks, benefits, and alternatives were discussed: yes    Universal protocol:     Procedure explained and questions answered to patient or proxy's satisfaction: yes      Patient identity confirmed:  Verbally with patient  Post-procedure details:     Procedure completion:  Tolerated  Comments:      No external hemorrhoids.  Her tone is actually good with a little weakness with the valsalva.  On anoscopy, looking in 360 degrees, no irritation of the internal hemorrhoids.  No active bleeding.  She has hard stool high in the rectal vault.      Last Recorded Vitals  /68   Pulse 81   Wt 76.7 kg (169 lb)      Assessment/Plan   Monica has fecal soiling and will start going to Biofeedback to help strengthen her anus and pelvic floor.  She will start taking Metamucil daily to keep her stools bulked and soft.  She will call with any issues and will follow up after Biofeedback if needed.       JO Stephens       [1]   Family History  Problem Relation Name Age of Onset    No Known Problems Mother      No Known Problems Father leroy zavala

## 2025-08-05 DIAGNOSIS — K92.1 MELENA: Primary | ICD-10-CM

## 2025-08-07 LAB
ERYTHROCYTE [DISTWIDTH] IN BLOOD BY AUTOMATED COUNT: 12.9 % (ref 11–15)
HCT VFR BLD AUTO: 33.4 % (ref 35–45)
HGB BLD-MCNC: 11.1 G/DL (ref 11.7–15.5)
MCH RBC QN AUTO: 30 PG (ref 27–33)
MCHC RBC AUTO-ENTMCNC: 33.2 G/DL (ref 32–36)
MCV RBC AUTO: 90.3 FL (ref 80–100)
PLATELET # BLD AUTO: 226 THOUSAND/UL (ref 140–400)
PMV BLD REES-ECKER: 9.1 FL (ref 7.5–12.5)
RBC # BLD AUTO: 3.7 MILLION/UL (ref 3.8–5.1)
WBC # BLD AUTO: 4.1 THOUSAND/UL (ref 3.8–10.8)

## 2025-08-20 LAB
ALBUMIN SERPL-MCNC: 4.3 G/DL (ref 3.6–5.1)
ALBUMIN/CREAT UR: 4 MG/G CREAT
BUN SERPL-MCNC: 30 MG/DL (ref 7–25)
BUN/CREAT SERPL: 16 (CALC) (ref 6–22)
CA-I SERPL-MCNC: 5 MG/DL (ref 4.7–5.5)
CALCIUM SERPL-MCNC: 9.2 MG/DL (ref 8.6–10.4)
CALCIUM SERPL-MCNC: 9.2 MG/DL (ref 8.6–10.4)
CHLORIDE SERPL-SCNC: 103 MMOL/L (ref 98–110)
CO2 SERPL-SCNC: 24 MMOL/L (ref 20–32)
CREAT SERPL-MCNC: 1.82 MG/DL (ref 0.6–1)
CREAT UR-MCNC: 77 MG/DL (ref 20–275)
EGFRCR SERPLBLD CKD-EPI 2021: 29 ML/MIN/1.73M2
ERYTHROCYTE [DISTWIDTH] IN BLOOD BY AUTOMATED COUNT: 13.1 % (ref 11–15)
GLUCOSE SERPL-MCNC: 111 MG/DL (ref 65–139)
HCT VFR BLD AUTO: 35 % (ref 35–45)
HGB BLD-MCNC: 11.5 G/DL (ref 11.7–15.5)
MCH RBC QN AUTO: 29.9 PG (ref 27–33)
MCHC RBC AUTO-ENTMCNC: 32.9 G/DL (ref 32–36)
MCV RBC AUTO: 91.1 FL (ref 80–100)
MICROALBUMIN UR-MCNC: 0.3 MG/DL
PHOSPHATE SERPL-MCNC: 4.5 MG/DL (ref 2.1–4.3)
PLATELET # BLD AUTO: 236 THOUSAND/UL (ref 140–400)
PMV BLD REES-ECKER: 9.1 FL (ref 7.5–12.5)
POTASSIUM SERPL-SCNC: 4 MMOL/L (ref 3.5–5.3)
PTH-INTACT SERPL-MCNC: 86 PG/ML (ref 16–77)
RBC # BLD AUTO: 3.84 MILLION/UL (ref 3.8–5.1)
SODIUM SERPL-SCNC: 136 MMOL/L (ref 135–146)
WBC # BLD AUTO: 3.5 THOUSAND/UL (ref 3.8–10.8)

## 2025-08-22 ENCOUNTER — EVALUATION (OUTPATIENT)
Dept: PHYSICAL THERAPY | Facility: CLINIC | Age: 75
End: 2025-08-22
Payer: MEDICARE

## 2025-08-22 DIAGNOSIS — M62.89 PELVIC FLOOR DYSFUNCTION: Primary | ICD-10-CM

## 2025-08-22 DIAGNOSIS — R15.1 FECAL SOILING: ICD-10-CM

## 2025-08-22 PROCEDURE — 97110 THERAPEUTIC EXERCISES: CPT | Mod: GP | Performed by: PHYSICAL THERAPIST

## 2025-08-22 PROCEDURE — 97535 SELF CARE MNGMENT TRAINING: CPT | Mod: GP | Performed by: PHYSICAL THERAPIST

## 2025-08-22 PROCEDURE — 97162 PT EVAL MOD COMPLEX 30 MIN: CPT | Mod: GP | Performed by: PHYSICAL THERAPIST

## 2025-08-27 ENCOUNTER — APPOINTMENT (OUTPATIENT)
Dept: NEPHROLOGY | Facility: CLINIC | Age: 75
End: 2025-08-27
Payer: MEDICARE

## 2025-08-27 VITALS
OXYGEN SATURATION: 97 % | WEIGHT: 167.8 LBS | DIASTOLIC BLOOD PRESSURE: 72 MMHG | TEMPERATURE: 98.4 F | BODY MASS INDEX: 26.34 KG/M2 | HEIGHT: 67 IN | SYSTOLIC BLOOD PRESSURE: 120 MMHG | HEART RATE: 74 BPM

## 2025-08-27 DIAGNOSIS — N18.1 TYPE 2 DIABETES MELLITUS WITH STAGE 1 CHRONIC KIDNEY DISEASE, WITHOUT LONG-TERM CURRENT USE OF INSULIN (MULTI): Primary | ICD-10-CM

## 2025-08-27 DIAGNOSIS — E11.22 TYPE 2 DIABETES MELLITUS WITH STAGE 1 CHRONIC KIDNEY DISEASE, WITHOUT LONG-TERM CURRENT USE OF INSULIN (MULTI): Primary | ICD-10-CM

## 2025-08-27 DIAGNOSIS — I10 ESSENTIAL HYPERTENSION: ICD-10-CM

## 2025-08-27 DIAGNOSIS — N18.32 STAGE 3B CHRONIC KIDNEY DISEASE (MULTI): ICD-10-CM

## 2025-08-27 ASSESSMENT — PAIN SCALES - GENERAL: PAINLEVEL_OUTOF10: 0-NO PAIN

## 2025-09-02 ENCOUNTER — TREATMENT (OUTPATIENT)
Dept: PHYSICAL THERAPY | Facility: CLINIC | Age: 75
End: 2025-09-02
Payer: MEDICARE

## 2025-09-02 DIAGNOSIS — M62.89 PELVIC FLOOR DYSFUNCTION: Primary | ICD-10-CM

## 2025-09-02 DIAGNOSIS — R15.1 FECAL SOILING: ICD-10-CM

## 2025-09-02 PROCEDURE — 97112 NEUROMUSCULAR REEDUCATION: CPT | Mod: GP | Performed by: PHYSICAL THERAPIST

## 2025-09-02 PROCEDURE — 97110 THERAPEUTIC EXERCISES: CPT | Mod: GP | Performed by: PHYSICAL THERAPIST

## 2025-09-08 ENCOUNTER — APPOINTMENT (OUTPATIENT)
Dept: PRIMARY CARE | Facility: CLINIC | Age: 75
End: 2025-09-08
Payer: MEDICARE

## 2025-09-09 ENCOUNTER — APPOINTMENT (OUTPATIENT)
Dept: PHARMACY | Facility: HOSPITAL | Age: 75
End: 2025-09-09
Payer: MEDICARE

## 2025-10-10 ENCOUNTER — APPOINTMENT (OUTPATIENT)
Dept: GASTROENTEROLOGY | Facility: CLINIC | Age: 75
End: 2025-10-10
Payer: MEDICARE

## 2025-12-10 ENCOUNTER — APPOINTMENT (OUTPATIENT)
Dept: NEPHROLOGY | Facility: CLINIC | Age: 75
End: 2025-12-10
Payer: MEDICARE

## 2026-01-15 ENCOUNTER — APPOINTMENT (OUTPATIENT)
Dept: OPHTHALMOLOGY | Facility: CLINIC | Age: 76
End: 2026-01-15
Payer: MEDICARE
